# Patient Record
Sex: MALE | Race: WHITE | Employment: FULL TIME | URBAN - METROPOLITAN AREA
[De-identification: names, ages, dates, MRNs, and addresses within clinical notes are randomized per-mention and may not be internally consistent; named-entity substitution may affect disease eponyms.]

---

## 2020-09-08 DIAGNOSIS — G47.09 OTHER INSOMNIA: ICD-10-CM

## 2020-09-09 RX ORDER — ZOLPIDEM TARTRATE 5 MG/1
TABLET ORAL
Qty: 30 TAB | Refills: 1 | Status: SHIPPED | OUTPATIENT
Start: 2020-09-09 | End: 2020-11-04 | Stop reason: SDUPTHER

## 2020-09-18 DIAGNOSIS — E55.9 VITAMIN D DEFICIENCY, UNSPECIFIED: ICD-10-CM

## 2020-09-18 RX ORDER — ERGOCALCIFEROL 1.25 MG/1
CAPSULE ORAL
Qty: 12 CAP | Refills: 0 | Status: SHIPPED | OUTPATIENT
Start: 2020-09-18 | End: 2020-11-04 | Stop reason: ALTCHOICE

## 2020-09-24 DIAGNOSIS — J30.9 ALLERGIC RHINITIS, UNSPECIFIED: ICD-10-CM

## 2020-09-24 RX ORDER — FLUTICASONE PROPIONATE 50 MCG
SPRAY, SUSPENSION (ML) NASAL
Qty: 1 BOTTLE | Refills: 1 | Status: SHIPPED | OUTPATIENT
Start: 2020-09-24 | End: 2020-10-19

## 2020-09-29 DIAGNOSIS — G47.09 OTHER INSOMNIA: Primary | ICD-10-CM

## 2020-09-29 NOTE — TELEPHONE ENCOUNTER
Pt left a voicemail on 09/29/20 @ 4:25 pm, stating that he needs a refill on zolpidem (AMBIEN) 5 mg tablet sent to [facility]).

## 2020-09-30 RX ORDER — ZOLPIDEM TARTRATE 5 MG/1
5 TABLET ORAL
Qty: 30 TAB | Refills: 3 | Status: SHIPPED | OUTPATIENT
Start: 2020-09-30 | End: 2020-11-04 | Stop reason: ALTCHOICE

## 2020-10-17 DIAGNOSIS — J30.9 ALLERGIC RHINITIS, UNSPECIFIED: ICD-10-CM

## 2020-10-19 RX ORDER — HYDROCHLOROTHIAZIDE 25 MG/1
TABLET ORAL
Qty: 90 TAB | Refills: 3 | Status: SHIPPED | OUTPATIENT
Start: 2020-10-19 | End: 2021-04-15

## 2020-10-19 RX ORDER — FLUTICASONE PROPIONATE 50 MCG
SPRAY, SUSPENSION (ML) NASAL
Qty: 1 BOTTLE | Refills: 1 | Status: SHIPPED | OUTPATIENT
Start: 2020-10-19 | End: 2020-11-04 | Stop reason: ALTCHOICE

## 2020-11-04 ENCOUNTER — OFFICE VISIT (OUTPATIENT)
Dept: FAMILY MEDICINE CLINIC | Age: 55
End: 2020-11-04
Payer: COMMERCIAL

## 2020-11-04 VITALS
DIASTOLIC BLOOD PRESSURE: 99 MMHG | HEART RATE: 91 BPM | OXYGEN SATURATION: 9 % | SYSTOLIC BLOOD PRESSURE: 167 MMHG | BODY MASS INDEX: 30.51 KG/M2 | TEMPERATURE: 97 F | WEIGHT: 230.2 LBS | HEIGHT: 73 IN

## 2020-11-04 DIAGNOSIS — I10 ESSENTIAL HYPERTENSION: Primary | ICD-10-CM

## 2020-11-04 DIAGNOSIS — B35.1 ONYCHOMYCOSIS: ICD-10-CM

## 2020-11-04 DIAGNOSIS — G47.09 OTHER INSOMNIA: ICD-10-CM

## 2020-11-04 DIAGNOSIS — E11.9 TYPE 2 DIABETES MELLITUS WITHOUT COMPLICATION, WITHOUT LONG-TERM CURRENT USE OF INSULIN (HCC): ICD-10-CM

## 2020-11-04 DIAGNOSIS — R43.0 LOSS OF PERCEPTION FOR SMELL: ICD-10-CM

## 2020-11-04 DIAGNOSIS — E55.9 VITAMIN D DEFICIENCY: ICD-10-CM

## 2020-11-04 PROCEDURE — 99214 OFFICE O/P EST MOD 30 MIN: CPT | Performed by: FAMILY MEDICINE

## 2020-11-04 RX ORDER — BLOOD-GLUCOSE METER
KIT MISCELLANEOUS
Qty: 300 STRIP | Refills: 5 | Status: SHIPPED | OUTPATIENT
Start: 2020-11-04

## 2020-11-04 RX ORDER — INSULIN PUMP SYRINGE, 3 ML
EACH MISCELLANEOUS
Qty: 1 KIT | Refills: 0 | Status: SHIPPED | OUTPATIENT
Start: 2020-11-04 | End: 2020-11-13 | Stop reason: DRUGHIGH

## 2020-11-04 RX ORDER — LANCETS
EACH MISCELLANEOUS
Qty: 300 EACH | Refills: 5 | Status: SHIPPED | OUTPATIENT
Start: 2020-11-04 | End: 2021-12-22 | Stop reason: ALTCHOICE

## 2020-11-04 RX ORDER — ZOLPIDEM TARTRATE 5 MG/1
5 TABLET ORAL
Qty: 30 TAB | Refills: 3 | Status: SHIPPED | OUTPATIENT
Start: 2020-11-04 | End: 2022-03-17

## 2020-11-04 NOTE — PROGRESS NOTES
ID:  Willy Telles , 54 y.o., male      CHIEF COMPLAINT:   Chief Complaint   Patient presents with    Diabetes     c/o loss of appetite and loss of taste. C/o sore on left big toe. Patient Active Problem List   Diagnosis Code    ED (erectile dysfunction) N52.9    Insomnia G47.00    Gout M10.9    Diabetes mellitus ([facility] [address] E11.9    HTN (hypertension) I10    Hypertension I10    Diabetes ([facility] [address] E11.9       HISTORY OF PRESENT ILLNESS:    Deneen Amador is a 54 y.o. male with the above listed medical problems and comes in today for several complaints. First is he needs a diabetic meter. He has type 2 diabetes which is poorly controlled. His last A1c was 9.9%. This was in June of this year. He was put on Jardiance in addition to his Metformin. He denies any other urinary tract infections genital itching scratching or rashes. He denies any dysuria. He also complains of a loss of sense of smell and taste. He says this was going away before COVID-19 and is still persistent. He got some over-the-counter medication for heartburn which sounds like Pepcid. However, it is only been 2 weeks and he says he has not noted a difference. His heartburn however, is better. He has no hematochezia or melena. He also complains of an ulcer on his left great toe. Is been there for about 3 weeks. He has no pain. There is no discharge. He denies fever chills or sweats. He has not been putting anything on it. He thinks is from a pair shoes he had that rubbed that area. He no longer wears them. He also has insomnia. He says the zolpidem 5 mg daily does help. He has no morning time grogginess. He takes no opioids. He recently had a left ulnar nerve transposition and carpal tunnel release. He is on disability currently for that and is going back to work soon. He will have the other one done at the next available opportunity on the right side.   Just carpal tunnel although no ulnar issues. ALLERGIES:   Allergies   Allergen Reactions    Teicoplanin Rash         MEDICATIONS:     Current Outpatient Medications:     hydroCHLOROthiazide (HYDRODIURIL) 25 mg tablet, TAKE 1 TABLET BY MOUTH EVERY DAY FOR BLOOD PRESSURE, Disp: 90 Tab, Rfl: 3    zolpidem (AMBIEN) 5 mg tablet, TAKE 1 TABLET BY MOUTH EVERY DAY, Disp: 30 Tab, Rfl: 1    valsartan (DIOVAN) 160 mg tablet, Take  by mouth daily. , Disp: , Rfl:     allopurinol (ZYLOPRIM) 300 mg tablet, Take  by mouth daily. , Disp: , Rfl:     metFORMIN (GLUCOPHAGE) 1,000 mg tablet, Take 1,000 mg by mouth two (2) times daily (with meals). , Disp: , Rfl:     glucose blood VI test strips (FREESTYLE LITE STRIPS) strip, Bid monitoring dx: 250.00, Disp: 1 Package, Rfl: 11    Blood-Glucose Meter (FREESTYLE LITE METER) monitoring kit, Dx: 250.00, Disp: 1 Kit, Rfl: 0    Lancets misc, Bid monitoring dx: 250.00, Disp: 1 Package, Rfl: 11    SOCIAL:   Social History     Tobacco Use    Smoking status: Never Smoker    Smokeless tobacco: Never Used   Substance Use Topics    Alcohol use: No    Drug use: No       SURGERIES:   Past Surgical History:   Procedure Laterality Date    ABDOMEN SURGERY PROC UNLISTED      HX CARPAL TUNNEL RELEASE Left 09/21/2020    HX HEENT      HX ORTHOPAEDIC            FAMILY HX:  Family History   Problem Relation Age of Onset    Hypertension Mother     Diabetes Father     Stroke Father     Heart Disease Father     Heart Attack Father          Review of systems:   I personally collected this information from the patient , available records and [facility] members [facility]  Review of Systems   Constitutional: Positive for appetite change and unexpected weight change (Not unexpected necessarily but not trying however his appetite is diminished, 6 pounds since July 2020.). Negative for activity change, chills, diaphoresis and fever.    HENT: Negative for congestion, ear discharge, ear pain, hearing loss, rhinorrhea, sinus pressure, sneezing, sore throat, tinnitus, trouble swallowing and voice change. Eyes: Negative for photophobia, pain, discharge and visual disturbance. Respiratory: Negative for cough, chest tightness, shortness of breath and wheezing. Cardiovascular: Negative for chest pain, palpitations and leg swelling. Gastrointestinal: Negative for abdominal distention, abdominal pain, blood in stool, constipation, diarrhea, nausea and vomiting. Endocrine: Negative for cold intolerance, heat intolerance, polydipsia and polyphagia. Genitourinary: Negative for difficulty urinating, dysuria, frequency, hematuria and urgency. Musculoskeletal: Positive for arthralgias, joint swelling and myalgias. Negative for back pain, gait problem and neck pain. Skin: Positive for color change and wound. Negative for rash. Neurological: Negative for dizziness, tremors, syncope, speech difficulty, weakness, light-headedness, numbness and headaches. Hematological: Negative for adenopathy. Does not bruise/bleed easily. Psychiatric/Behavioral: Positive for sleep disturbance. Negative for agitation, behavioral problems, confusion, decreased concentration, dysphoric mood, hallucinations and suicidal ideas. The patient is not nervous/anxious. VITAL SIGNS:   Visit Vitals  BP (!) 167/99 (BP 1 Location: Right arm, BP Patient Position: Sitting)   Pulse 91   Temp 97 °F (36.1 °C) (Oral)   Ht 6' 1\" (1.854 m)   Wt 230 lb 3.2 oz (104.4 kg)   SpO2 (!) 9%   BMI 30.37 kg/m²     Blood pressure recheck: 160/90      PHYSICAL EXAMINATION:  Physical Exam  Nursing note reviewed. Constitutional:       General: He is not in acute distress. Appearance: Normal appearance. He is obese. He is not ill-appearing or toxic-appearing. HENT:      Right Ear: Tympanic membrane, ear canal and external ear normal.      Left Ear: Tympanic membrane, ear canal and external ear normal.      Nose: Congestion (Slight nasal bogginess with no exudate. Slight erythema.) and rhinorrhea present. Mouth/Throat:      Pharynx: Posterior oropharyngeal erythema present. No oropharyngeal exudate. Eyes:      General: No scleral icterus. Extraocular Movements: Extraocular movements intact. Conjunctiva/sclera: Conjunctivae normal.      Pupils: Pupils are equal, round, and reactive to light. Neck:      Musculoskeletal: No neck rigidity or muscular tenderness. Vascular: No carotid bruit. Cardiovascular:      Rate and Rhythm: Normal rate and regular rhythm. Pulses: Normal pulses. Heart sounds: No murmur. No friction rub. No gallop. Comments: Pedal pulses are normal bilaterally. Pulmonary:      Effort: Pulmonary effort is normal.      Breath sounds: Normal breath sounds. No wheezing, rhonchi or rales. Abdominal:      General: Bowel sounds are normal. There is no distension. Palpations: Abdomen is soft. There is no mass. Tenderness: There is no abdominal tenderness. Musculoskeletal:         General: No swelling, tenderness or deformity. Right lower leg: No edema. Left lower leg: No edema. Lymphadenopathy:      Cervical: No cervical adenopathy. Skin:     Capillary Refill: Capillary refill takes less than 2 seconds. Coloration: Skin is not jaundiced. Findings: Lesion (Dorsal left great toe has about a 4 mm central eschar that has about 8 mm of slightly raised erythema. There is no streaking. There is no exudate. There is no warmth. There is no tenderness. Capillary refill is normal.  It feels very superficial.) present. No erythema or rash. Neurological:      General: No focal deficit present. Mental Status: He is alert and oriented to person, place, and time. Mental status is at baseline. Cranial Nerves: No cranial nerve deficit. Sensory: No sensory deficit.       Coordination: Coordination normal.      Gait: Gait normal.   Psychiatric:         Mood and Affect: Mood normal. Behavior: Behavior normal.         Thought Content: Thought content normal.         Judgment: Judgment normal.         ASSESSMENT/PLAN:    The following diagnoses were identified today, reviewed and discussed. 1. Essential hypertension    Recheck: Is a little improved so continue to monitor this. - LIPID PANEL  - TSH 3RD GENERATION  - CBC WITH AUTOMATED DIFF  - METABOLIC PANEL, COMPREHENSIVE    2. Type 2 diabetes mellitus without complication, without long-term current use of insulin (HCC)  Tenuous medications. However, we will check labs and follow-up and treat as indicated. I did recommend fingerstick blood sugar 3 times daily.    - Blood-Glucose Meter monitoring kit; Use  To check sugars 3 times daily  DX CODE E11.65  Dispense: 1 Kit; Refill: 0  - glucose blood VI test strips (FreeStyle Lite Strips) strip; Use  To check sugars 3 times daily  DX CODE E11.65  Dispense: 300 Strip; Refill: 5  - lancets misc; Use  To check sugars 3 times daily  DX CODE E11.65  Dispense: 300 Each; Refill: 5  - HEMOGLOBIN A1C WITH EAG  - LIPID PANEL  - METABOLIC PANEL, COMPREHENSIVE    3. Other insomnia    Quality of life is affected positively by a good nights rest for this patient and there are no adverse side effects so he may continue the zolpidem. - zolpidem (AMBIEN) 5 mg tablet; Take 1 Tab by mouth nightly. Max Daily Amount: 5 mg. Dispense: 30 Tab; Refill: 3    4. Loss of perception for smell  Undetermined. Sounds like it could be possibly some silent reflux (LPR). I recommend continuing the antiacid (H2 blocker) for 2 months and then call me certainly if needed we can send to an ENT. 5. Onychomycosis    This is a bilateral feet. I recommended just monitoring because of the adverse effects of the pills and the topicals were not as effective. 6. Vitamin D deficiency    - VITAMIN D, 25 HYDROXY        Discussion:    The patient and I discussed the following items/issues;     Each diagnosis listed for today's visit including medications, treatment, testing such as labs, imagine, referrals and when to call regarding results and appointments. Reminded patient to keep any and all appointments with specialists, labs, imaging. Reminded patient to make sure we get copies of any specialists care, labs and imaging. Reminded patient to call of come by the office if there are any concerns, questions , comments or problems. The patient verbalized understanding of the care plan and all questions were answered to the patient's satisfaction prior to leaving the office. The patient was told that failure to comply with recommended testing could result in abnormal health consequences. The patient was instructed to have yearly routine health maintenance including but not limited to age appropriate vaccines, testing, screening exams. The patient actively participated in medical decision making. FOLLOW UP:   Patient knows to keep any and all future visits scheduled unless told otherwise. Patient knows to call, come back if any concerns, questions, comments or problems arise. Follow-up and Dispositions    · Return in about 3 months (around 2/4/2021) for Follow  up diabetes, bp, nerve issues, sleep. This visit may have been completed , in part, with voice recognition software as well as typing and may have syntax errors despite editing.       Roselyn Velez, DO

## 2020-11-04 NOTE — PATIENT INSTRUCTIONS
General Health and concerns:  HEART HEALTHY DIET:  A heart healthy diet is one that is low in cholesterol (less than 300 mg daily), fat (less than 80 g daily) . You should also minimize carbohydrates / sugars (less amounts of breads, pastas, potato and potato products and sugary foods/snacks, cookies, cakes, etc) . Try to eat whole wheat/multigrain breads and pastas and eat more vegetables. Cook with olive oil (or no oil) and grill, bake, broil or boil foods. Less red meat and more chicken , fish and lean cuts of beef (limited). 6649-3784 calories per day is sufficient 2503-4924 is acceptable for weight loss. EXERCISE:  You should do exercise 3-5 days per week (minimum) to include increasing your heart rate for 30 to 45 minutes. At least a pace of a brisk walk should do that. This build up your heart and lung endurance and muscles and helps many function of the body. OTHER:        Routine Health maintenance: You need to get a yearly follow up/physical exam to review, discuss age and gender appropriate exams, labs, vaccines and screening tests. This includes cardiovascular health risk, cancer screens and other nisha related topics. Medications-take all medications as directed. Please do not stop unless you talk to your doctor or health care provider first. Report any problems immediately. Referrals: if you have been given a referral, please call the office if you do not hear from provider in one week. You may make the appointment yourself. Please keep all appointments with specialists and ask them to send their notes, thoughts, recommendations to us , as your PCP. Imaging/Labs:  Be sure to get these images in a timely manner. IF your test must be scheduled, let us know if you need help getting this done and if you do not hear from that provider in a week , call us or them.   BE SURE to call the office if you do not hear regarding the results in one week after the test is performed Image or lab). It is our intention to inform you of the results ALWAYS, even if normal you should get a notification (Call, portal message). PLEASE flo if you do not get the results. PLEASE follow all recommendations and call/come in /ask questions if you do not understand of if problems develop after or in between visits. Failure to comply with recommended health care advise could result in serious health consequences. Thank you for choosing our practice and please let us know how we can help you feel better and stay well!

## 2020-11-13 ENCOUNTER — TELEPHONE (OUTPATIENT)
Dept: FAMILY MEDICINE CLINIC | Age: 55
End: 2020-11-13

## 2020-11-13 DIAGNOSIS — E11.9 TYPE 2 DIABETES MELLITUS WITHOUT COMPLICATION, WITHOUT LONG-TERM CURRENT USE OF INSULIN (HCC): Primary | ICD-10-CM

## 2020-11-13 RX ORDER — LANCETS
EACH MISCELLANEOUS
Qty: 300 EACH | Refills: 3 | Status: SHIPPED | OUTPATIENT
Start: 2020-11-13

## 2020-11-13 RX ORDER — BLOOD-GLUCOSE METER
EACH MISCELLANEOUS
Qty: 1 EACH | Refills: 0 | Status: SHIPPED | OUTPATIENT
Start: 2020-11-13

## 2020-11-13 RX ORDER — LANCING DEVICE/LANCETS
KIT MISCELLANEOUS
Qty: 1 KIT | Refills: 0 | Status: SHIPPED | OUTPATIENT
Start: 2020-11-13

## 2020-11-13 RX ORDER — BLOOD SUGAR DIAGNOSTIC
STRIP MISCELLANEOUS
Qty: 300 STRIP | Refills: 3 | Status: SHIPPED | OUTPATIENT
Start: 2020-11-13 | End: 2021-11-29

## 2020-11-13 NOTE — TELEPHONE ENCOUNTER
54 y.o. , Luigi Yung , male       Allergies   Allergen Reactions    Teicoplanin Rash     Current Outpatient Medications on File Prior to Visit   Medication Sig Dispense Refill    zolpidem (AMBIEN) 5 mg tablet Take 1 Tab by mouth nightly. Max Daily Amount: 5 mg. 30 Tab 3    glucose blood VI test strips (FreeStyle Lite Strips) strip Use  To check sugars 3 times daily  DX CODE E11.65 300 Strip 5    lancets misc Use  To check sugars 3 times daily  DX CODE E11.65 300 Each 5    [DISCONTINUED] Blood-Glucose Meter monitoring kit Use  To check sugars 3 times daily  DX CODE E11.65 1 Kit 0    hydroCHLOROthiazide (HYDRODIURIL) 25 mg tablet TAKE 1 TABLET BY MOUTH EVERY DAY FOR BLOOD PRESSURE 90 Tab 3    valsartan (DIOVAN) 160 mg tablet Take  by mouth daily.  allopurinol (ZYLOPRIM) 300 mg tablet Take  by mouth daily.  metFORMIN (GLUCOPHAGE) 1,000 mg tablet Take 1,000 mg by mouth two (2) times daily (with meals).  glucose blood VI test strips (FREESTYLE LITE STRIPS) strip Bid monitoring dx: 250.00 1 Package 11    Lancets misc Bid monitoring dx: 250.00 1 Package 11    [DISCONTINUED] Blood-Glucose Meter (FREESTYLE LITE METER) monitoring kit Dx: 250.00 1 Kit 0     No current facility-administered medications on file prior to visit. Patient Active Problem List   Diagnosis Code    ED (erectile dysfunction) N52.9    Insomnia G47.00    Gout M10.9    Diabetes mellitus ([address] E11.9    HTN (hypertension) I10    Hypertension I10    Diabetes ([facility] [address] E11.9    Loss of perception for smell R48.1    Onychomycosis B35.1       1.  Type 2 diabetes mellitus without complication, without long-term current use of insulin (HCC)    - Blood-Glucose Meter (Accu-Chek Guide Me Glucose Mtr) misc; Use to check finger stick blood glucose TID  DX CODE E11.9  Dispense: 1 Each; Refill: 0  - glucose blood VI test strips (Accu-Chek Guide test strips) strip; Use to check finger stick blood glucose TID  DX CODE E11.9  Dispense: 300 Strip; Refill: 3  - Lancing Device with Lancets (Accu-Chek Multiclix Lancet) kit; Use to check finger stick blood glucose TID  DX CODE E11.9  Dispense: 1 Kit;  Refill: 0  - lancets (Accu-Chek Multiclix Lancet) misc; Use to check finger stick blood glucose TID  DX CODE E11.9  Dispense: 300 Each; Refill: [facility]

## 2020-11-13 NOTE — TELEPHONE ENCOUNTER
Patient called stating that his insurance will not cover the type of Blood-Glucose Meter/test strips/lancets that was ordered on his last visit. His insurance will cover Accucheck Guide Me Meter/Strips/Lancets. Can a new order for these be sent to [facility]

## 2020-12-18 DIAGNOSIS — E11.9 TYPE 2 DIABETES MELLITUS WITHOUT COMPLICATIONS (HCC): ICD-10-CM

## 2020-12-21 RX ORDER — METFORMIN HYDROCHLORIDE 1000 MG/1
TABLET ORAL
Qty: 180 TAB | Refills: 1 | Status: SHIPPED | OUTPATIENT
Start: 2020-12-21 | End: 2021-05-24

## 2020-12-30 DIAGNOSIS — E55.9 VITAMIN D DEFICIENCY, UNSPECIFIED: ICD-10-CM

## 2020-12-30 RX ORDER — ERGOCALCIFEROL 1.25 MG/1
CAPSULE ORAL
Qty: 12 CAP | Refills: 0 | Status: SHIPPED | OUTPATIENT
Start: 2020-12-30 | End: 2021-04-15 | Stop reason: SDUPTHER

## 2021-01-11 RX ORDER — ALLOPURINOL 300 MG/1
TABLET ORAL
Qty: 90 TAB | Refills: 5 | Status: SHIPPED | OUTPATIENT
Start: 2021-01-11 | End: 2022-03-01 | Stop reason: SDUPTHER

## 2021-03-25 DIAGNOSIS — F41.1 GENERALIZED ANXIETY DISORDER: ICD-10-CM

## 2021-03-25 RX ORDER — BUSPIRONE HYDROCHLORIDE 5 MG/1
TABLET ORAL
Qty: 180 TAB | Refills: 2 | Status: SHIPPED | OUTPATIENT
Start: 2021-03-25 | End: 2022-04-21 | Stop reason: ALTCHOICE

## 2021-04-09 LAB
25(OH)D3+25(OH)D2 SERPL-MCNC: 17.6 NG/ML (ref 30–100)
ALBUMIN SERPL-MCNC: 4.4 G/DL (ref 3.8–4.9)
ALBUMIN/GLOB SERPL: 1.8 {RATIO} (ref 1.2–2.2)
ALP SERPL-CCNC: 115 IU/L (ref 39–117)
ALT SERPL-CCNC: 13 IU/L (ref 0–44)
AST SERPL-CCNC: 9 IU/L (ref 0–40)
BASOPHILS # BLD AUTO: 0.1 X10E3/UL (ref 0–0.2)
BASOPHILS NFR BLD AUTO: 1 %
BILIRUB SERPL-MCNC: 0.6 MG/DL (ref 0–1.2)
BUN SERPL-MCNC: 26 MG/DL (ref 6–24)
BUN/CREAT SERPL: 20 (ref 9–20)
CALCIUM SERPL-MCNC: 9.4 MG/DL (ref 8.7–10.2)
CHLORIDE SERPL-SCNC: 103 MMOL/L (ref 96–106)
CHOLEST SERPL-MCNC: 178 MG/DL (ref 100–199)
CO2 SERPL-SCNC: 25 MMOL/L (ref 20–29)
CREAT SERPL-MCNC: 1.32 MG/DL (ref 0.76–1.27)
EOSINOPHIL # BLD AUTO: 0.4 X10E3/UL (ref 0–0.4)
EOSINOPHIL NFR BLD AUTO: 7 %
ERYTHROCYTE [DISTWIDTH] IN BLOOD BY AUTOMATED COUNT: 13.5 % (ref 11.6–15.4)
EST. AVERAGE GLUCOSE BLD GHB EST-MCNC: 229 MG/DL
GLOBULIN SER CALC-MCNC: 2.5 G/DL (ref 1.5–4.5)
GLUCOSE SERPL-MCNC: 129 MG/DL (ref 65–99)
HBA1C MFR BLD: 9.6 % (ref 4.8–5.6)
HCT VFR BLD AUTO: 47 % (ref 37.5–51)
HDLC SERPL-MCNC: 42 MG/DL
HGB BLD-MCNC: 15.2 G/DL (ref 13–17.7)
IMM GRANULOCYTES # BLD AUTO: 0 X10E3/UL (ref 0–0.1)
IMM GRANULOCYTES NFR BLD AUTO: 1 %
LDLC SERPL CALC-MCNC: 106 MG/DL (ref 0–99)
LYMPHOCYTES # BLD AUTO: 2.1 X10E3/UL (ref 0.7–3.1)
LYMPHOCYTES NFR BLD AUTO: 33 %
MCH RBC QN AUTO: 30.8 PG (ref 26.6–33)
MCHC RBC AUTO-ENTMCNC: 32.3 G/DL (ref 31.5–35.7)
MCV RBC AUTO: 95 FL (ref 79–97)
MONOCYTES # BLD AUTO: 0.4 X10E3/UL (ref 0.1–0.9)
MONOCYTES NFR BLD AUTO: 6 %
NEUTROPHILS # BLD AUTO: 3.5 X10E3/UL (ref 1.4–7)
NEUTROPHILS NFR BLD AUTO: 52 %
PLATELET # BLD AUTO: 197 X10E3/UL (ref 150–450)
POTASSIUM SERPL-SCNC: 4.9 MMOL/L (ref 3.5–5.2)
PROT SERPL-MCNC: 6.9 G/DL (ref 6–8.5)
RBC # BLD AUTO: 4.93 X10E6/UL (ref 4.14–5.8)
SODIUM SERPL-SCNC: 142 MMOL/L (ref 134–144)
TRIGL SERPL-MCNC: 173 MG/DL (ref 0–149)
TSH SERPL DL<=0.005 MIU/L-ACNC: 2.53 UIU/ML (ref 0.45–4.5)
VLDLC SERPL CALC-MCNC: 30 MG/DL (ref 5–40)
WBC # BLD AUTO: 6.5 X10E3/UL (ref 3.4–10.8)

## 2021-04-15 ENCOUNTER — OFFICE VISIT (OUTPATIENT)
Dept: FAMILY MEDICINE CLINIC | Age: 56
End: 2021-04-15
Payer: COMMERCIAL

## 2021-04-15 VITALS
TEMPERATURE: 97.5 F | HEART RATE: 82 BPM | WEIGHT: 232 LBS | DIASTOLIC BLOOD PRESSURE: 84 MMHG | OXYGEN SATURATION: 99 % | HEIGHT: 73 IN | SYSTOLIC BLOOD PRESSURE: 152 MMHG | BODY MASS INDEX: 30.75 KG/M2

## 2021-04-15 DIAGNOSIS — M19.90 ARTHRITIS: ICD-10-CM

## 2021-04-15 DIAGNOSIS — F41.1 GENERALIZED ANXIETY DISORDER: ICD-10-CM

## 2021-04-15 DIAGNOSIS — I10 ESSENTIAL HYPERTENSION: ICD-10-CM

## 2021-04-15 DIAGNOSIS — E11.9 TYPE 2 DIABETES MELLITUS WITHOUT COMPLICATION, WITHOUT LONG-TERM CURRENT USE OF INSULIN (HCC): Primary | ICD-10-CM

## 2021-04-15 DIAGNOSIS — R43.9 TASTE DISORDER: ICD-10-CM

## 2021-04-15 DIAGNOSIS — E55.9 VITAMIN D DEFICIENCY, UNSPECIFIED: ICD-10-CM

## 2021-04-15 PROCEDURE — 99214 OFFICE O/P EST MOD 30 MIN: CPT | Performed by: FAMILY MEDICINE

## 2021-04-15 RX ORDER — HYDROCHLOROTHIAZIDE 25 MG/1
1 TABLET ORAL DAILY
COMMUNITY
Start: 2020-07-29 | End: 2021-04-15

## 2021-04-15 RX ORDER — ERGOCALCIFEROL 1.25 MG/1
CAPSULE ORAL
Qty: 12 CAP | Refills: 3 | Status: SHIPPED | OUTPATIENT
Start: 2021-04-15 | End: 2022-04-16 | Stop reason: SDUPTHER

## 2021-04-15 RX ORDER — GLIMEPIRIDE 2 MG/1
2 TABLET ORAL
Qty: 30 TAB | Refills: 3 | Status: SHIPPED | OUTPATIENT
Start: 2021-04-15 | End: 2021-07-07

## 2021-04-15 NOTE — PATIENT INSTRUCTIONS
General Health and concerns:  HEART HEALTHY DIET:  A heart healthy diet is one that is low in cholesterol (less than 300 mg daily), fat (less than 80 g daily) . You should also minimize carbohydrates / sugars (less amounts of breads, pastas, potato and potato products and sugary foods/snacks, cookies, cakes, etc) . Try to eat whole wheat/multigrain breads and pastas and eat more vegetables. Cook with olive oil (or no oil) and grill, bake, broil or boil foods. Less red meat and more chicken , fish and lean cuts of beef (limited). 9794-2905 calories per day is sufficient 5276-8877 is acceptable for weight loss. EXERCISE:  You should do exercise 3-5 days per week (minimum) to include increasing your heart rate for 30 to 45 minutes. At least a pace of a brisk walk should do that. This build up your heart and lung endurance and muscles and helps many function of the body. OTHER:        Routine Health maintenance: You need to get a yearly follow up/physical exam to review, discuss age and gender appropriate exams, labs, vaccines and screening tests. This includes cardiovascular health risk, cancer screens and other nisha related topics. Medications-Take all medications as directed. Please do not stop unless you talk to your doctor or health care provider first. Report any problems immediately. Referrals: if you have been given a referral, please call the office if you do not hear from provider in one week. You may make the appointment yourself. Please keep all appointments with specialists and ask them to send their notes, thoughts, recommendations to us , as your PCP. Imaging/Labs:  Be sure to get these images in a timely manner. IF your test must be scheduled, let us know if you need help getting this done and if you do not hear from that provider in a week , call us or them.   BE SURE to call the office if you do not hear regarding the results in one week after the test is performed Image or lab). It is our intention to inform you of the results ALWAYS, even if normal you should get a notification (Call, portal message). PLEASE flo if you do not get the results. PLEASE follow all recommendations and call/come in /ask questions if you do not understand of if problems develop after or in between visits. Failure to comply with recommended health care advise could result in serious health consequences. Thank you for choosing our practice and please let us know how we can help you feel better and stay well!

## 2021-04-15 NOTE — PROGRESS NOTES
IDENTIFYING INFORMATION:  Miah Mcgee. Rafa , 54 y.o., male  [address] [place] [address],  [address]     CHIEF COMPLAINT:     Chief Complaint   Patient presents with    Hypertension    Diabetes     HISTORY OF PRESENT ILLNESS:    Rosa Roman is a 54 y.o. male  has a past medical history of Diabetes ([facility] [address] Gout, and Hypertension. .  he comes in for 6 month follow up. Has had a surgery on his left elbow since last visit. It was an ulnar nerve entrapment. Unfortunately he fell on it last week or so, at work, the film they were cutting up -was standing on and it pulled out from under him. Bruised badly but not painful and ROM is ok. Seeeing ortho next week for routine follow up. Sleep is poor and amitriptyline is helpful but feels groggy. Biggest shift is trying to sleep at night, the zolpidem helps during day.       PAST MEDICAL HISTORY:     Past Medical History:   Diagnosis Date    Diabetes ([facility] [address]     Gout     Hypertension        MEDICATIONS:     Current Outpatient Medications on File Prior to Visit   Medication Sig Dispense Refill    busPIRone (BUSPAR) 5 mg tablet TAKE 1 TABLET BY MOUTH TWICE A  Tab 2    allopurinoL (ZYLOPRIM) 300 mg tablet TAKE 1 TABLET BY MOUTH EVERY DAY 90 Tab 5    ergocalciferol (ERGOCALCIFEROL) 1,250 mcg (50,000 unit) capsule TAKE 1 CAPSULE BY MOUTH ONE TIME PER WEEK 12 Cap 0    metFORMIN (GLUCOPHAGE) 1,000 mg tablet TAKE 1 TABLET BY MOUTH TWICE A  Tab 1    Blood-Glucose Meter (Accu-Chek Guide Me Glucose Mtr) misc Use to check finger stick blood glucose TID  DX CODE E11.9 1 Each 0    glucose blood VI test strips (Accu-Chek Guide test strips) strip Use to check finger stick blood glucose TID  DX CODE E11.9 300 Strip 3    Lancing Device with Lancets (Accu-Chek Multiclix Lancet) kit Use to check finger stick blood glucose TID  DX CODE E11.9 1 Kit 0    lancets (Accu-Chek Multiclix Lancet) misc Use to check finger stick blood glucose TID  DX CODE E11.9 300 Each 3    zolpidem (AMBIEN) 5 mg tablet Take 1 Tab by mouth nightly. Max Daily Amount: 5 mg. 30 Tab 3    glucose blood VI test strips (FreeStyle Lite Strips) strip Use  To check sugars 3 times daily  DX CODE E11.65 300 Strip 5    lancets misc Use  To check sugars 3 times daily  DX CODE E11.65 300 Each 5    hydroCHLOROthiazide (HYDRODIURIL) 25 mg tablet TAKE 1 TABLET BY MOUTH EVERY DAY FOR BLOOD PRESSURE 90 Tab 3    valsartan (DIOVAN) 160 mg tablet Take  by mouth daily.  glucose blood VI test strips (FREESTYLE LITE STRIPS) strip Bid monitoring dx: 250.00 1 Package 11    Lancets misc Bid monitoring dx: 250.00 1 Package 11     No current facility-administered medications on file prior to visit. ALLERGIES:    Allergies   Allergen Reactions    Teicoplanin Rash         SOCIAL HISTORY:     Social History     Tobacco Use    Smoking status: Never Smoker    Smokeless tobacco: Never Used   Substance Use Topics    Alcohol use: No    Drug use: No       SURGICAL HISTORY:    Past Surgical History:   Procedure Laterality Date    HX CARPAL TUNNEL RELEASE Left 09/21/2020    HX HEENT      HX ORTHOPAEDIC      HX ORTHOPAEDIC Left 09/2020    Carpal tunnel, elbow and nerve transplant    CT ABDOMEN SURGERY PROC UNLISTED          FAMILY HISTORY:    Family History   Problem Relation Age of Onset    Hypertension Mother     Diabetes Father     Stroke Father     Heart Disease Father     Heart Attack Father          REVIEW OF SYSTEMS:    I personally collected this information from all available source present (patient/others in room and records available) -[email or web address]    Review of Systems   Constitutional: Positive for malaise/fatigue. Negative for chills, diaphoresis, fever and weight loss. HENT: Negative for congestion, ear pain, hearing loss, nosebleeds, sinus pain, sore throat and tinnitus. Taste is abnormal. Been covid tested twice and has been negative twice.     Eyes: Negative for blurred vision, double vision, photophobia and pain. Respiratory: Negative for cough, sputum production and shortness of breath. Cardiovascular: Negative for chest pain, palpitations, orthopnea, claudication, leg swelling and PND. Gastrointestinal: Negative for abdominal pain, blood in stool, constipation, diarrhea, heartburn, melena, nausea and vomiting. Genitourinary: Negative for dysuria, frequency and urgency. Musculoskeletal: Positive for joint pain and myalgias. Negative for back pain, falls and neck pain. Skin: Negative for itching and rash. Neurological: Negative for dizziness, tingling, tremors, sensory change, focal weakness and headaches. Psychiatric/Behavioral: Positive for depression. Negative for suicidal ideas. The patient is nervous/anxious and has insomnia. PHYSICAL EXAMINATION:    Vital Signs:    Visit Vitals  BP (!) 152/84 (BP 1 Location: Left upper arm, BP Patient Position: Sitting)   Pulse 82   Temp 97.5 °F (36.4 °C) (Temporal)   Ht 6' 1\" (1.854 m)   Wt 232 lb (105.2 kg)   SpO2 99%   BMI 30.61 kg/m²         Wt Readings from Last 3 Encounters:   04/15/21 232 lb (105.2 kg)   11/04/20 230 lb 3.2 oz (104.4 kg)   03/11/16 243 lb (110.2 kg)     BP Readings from Last 3 Encounters:   04/15/21 (!) 152/84   11/04/20 (!) 167/99   03/11/16 (!) 154/94         Physical Exam  Nursing note reviewed. Constitutional:       General: He is not in acute distress. Appearance: Normal appearance. He is not ill-appearing or toxic-appearing. HENT:      Right Ear: Tympanic membrane, ear canal and external ear normal.      Left Ear: Tympanic membrane, ear canal and external ear normal.      Nose: No congestion or rhinorrhea. Mouth/Throat:      Pharynx: No oropharyngeal exudate or posterior oropharyngeal erythema. Eyes:      General: No scleral icterus. Extraocular Movements: Extraocular movements intact.       Conjunctiva/sclera: Conjunctivae normal.      Pupils: Pupils are equal, round, and reactive to light. Neck:      Musculoskeletal: No neck rigidity or muscular tenderness. Vascular: No carotid bruit. Cardiovascular:      Rate and Rhythm: Normal rate and regular rhythm. Heart sounds: No murmur. No friction rub. No gallop. Pulmonary:      Effort: Pulmonary effort is normal.      Breath sounds: Normal breath sounds. No wheezing, rhonchi or rales. Abdominal:      General: Bowel sounds are normal. There is no distension. Palpations: Abdomen is soft. There is no mass. Tenderness: There is no abdominal tenderness. Musculoskeletal:         General: Swelling, tenderness and deformity present. Right lower leg: No edema. Left lower leg: No edema. Comments: Hands are swollen in the mcps and MIPS bilaterally. Knees are normal but stiffness to ROM, tender medially on the right. NO valgus or varus stress. Left elbow is swllign, tender and decreased ROM, status post surgery but it is actually normal Passive ROM. Ankles are normal.    Lymphadenopathy:      Cervical: No cervical adenopathy. Skin:     Capillary Refill: Capillary refill takes less than 2 seconds. Coloration: Skin is not jaundiced. Findings: No erythema or rash. Neurological:      General: No focal deficit present. Mental Status: He is alert and oriented to person, place, and time. Mental status is at baseline. Cranial Nerves: No cranial nerve deficit. Sensory: No sensory deficit. Coordination: Coordination normal.      Gait: Gait normal.   Psychiatric:         Mood and Affect: Mood normal.         Behavior: Behavior normal.         Thought Content: Thought content normal.         Judgment: Judgment normal.         ASSESSMENT/PLAN:    Discussion (regarding today's visit with Colt Riley); Add some glimepiride to his regimen.   I do realize this is a sulfonylurea and those are not recommended lately but this is a third line situation and his sugars are still very high so I recommend this to bring them down and I did discuss in detail the symptoms of hypoglycemia. Also, I refilled his vitamin D. With his placed this order I think that certainly would improve some his anxiety and depression if we can only figure out why so we will send him to ENT. He has been Covid tested twice and has been negative twice. He is having some arthritis in his hands and feet so we will rule out any autoimmune/rheumatological issues. It also sounds like he may need to find a new psychiatrist.  If he needs help with that he will let me know. ICD-10-CM ICD-9-CM    1. Type 2 diabetes mellitus without complication, without long-term current use of insulin (HCC)  E11.9 250.00 glimepiride (AMARYL) 2 mg tablet   2. Essential hypertension  I10 401.9    3. Vitamin D deficiency, unspecified  E55.9 268.9 ergocalciferol (ERGOCALCIFEROL) 1,250 mcg (50,000 unit) capsule   4. Generalized anxiety disorder  F41.1 300.02    5. Taste disorder  R43.9 781.1 REFERRAL TO ENT-OTOLARYNGOLOGY   6. Arthritis  M19.90 716.90 TERESA BY MULTIPLEX FLOW IA, QL      SED RATE (ESR)      RHEUMATOID FACTOR, QL     WE reviewed each diagnosis listed for today's visit including medications, treatment, testing such as labs, imagine, referrals and when to call regarding results and appointments. Reminded patient to keep any and all appointments with specialists, labs, imaging. Reminded patient to make sure we get copies of any specialists care, labs and imaging. Reminded patient to call of come by the office if there are any concerns, questions , comments or problems. The patient verbalized understanding of the care plan and all questions were answered to the patient's satisfaction prior to leaving the office. The patient was told that failure to comply with recommended testing could result in abnormal health consequences.     The patient was instructed to have yearly routine health maintenance including but not limited to age appropriate vaccines, testing, screening exams. ALL questions were answered to his satisfaction before leaving the office. The patient actively participated in medical decision making. FOLLOW UP:     Patient knows to keep any and all future visits scheduled unless told otherwise. Patient knows to call, come back if any concerns, questions, comments or problems arise. This visit may have been completed , in part, with voice recognition software as well as typing and may have syntax errors despite editing.       Musa Chamorro, DO

## 2021-04-15 NOTE — PROGRESS NOTES
1. Have you been to the ER, urgent care clinic since your last visit? Hospitalized since your last visit? No    2. Have you seen or consulted any other health care providers outside of the [address] [place] [address] since your last visit? Include any pap smears or colon screening.  No    Chief Complaint   Patient presents with    Hypertension    Diabetes     Visit Vitals  BP (!) 163/89 (BP 1 Location: Left upper arm, BP Patient Position: Sitting)   Pulse 89   Temp 97.5 °F (36.4 °C) (Temporal)   Ht 6' 1\" (1.854 m)   Wt 232 lb (105.2 kg)   SpO2 99%   BMI 30.61 kg/m²

## 2021-04-16 DIAGNOSIS — G47.09 OTHER INSOMNIA: ICD-10-CM

## 2021-04-16 RX ORDER — AMITRIPTYLINE HYDROCHLORIDE 25 MG/1
TABLET, FILM COATED ORAL
Qty: 90 TAB | Refills: 2 | Status: SHIPPED | OUTPATIENT
Start: 2021-04-16 | End: 2021-11-29

## 2021-05-10 ENCOUNTER — OFFICE VISIT (OUTPATIENT)
Dept: ENT CLINIC | Age: 56
End: 2021-05-10
Payer: COMMERCIAL

## 2021-05-10 VITALS
RESPIRATION RATE: 16 BRPM | HEART RATE: 83 BPM | WEIGHT: 231.6 LBS | BODY MASS INDEX: 31.37 KG/M2 | HEIGHT: 72 IN | OXYGEN SATURATION: 96 % | TEMPERATURE: 97.8 F | DIASTOLIC BLOOD PRESSURE: 82 MMHG | SYSTOLIC BLOOD PRESSURE: 150 MMHG

## 2021-05-10 DIAGNOSIS — R09.81 NASAL CONGESTION: ICD-10-CM

## 2021-05-10 DIAGNOSIS — R43.8 HYPOGEUSIA: Primary | ICD-10-CM

## 2021-05-10 PROCEDURE — 99243 OFF/OP CNSLTJ NEW/EST LOW 30: CPT | Performed by: OTOLARYNGOLOGY

## 2021-05-10 PROCEDURE — 31575 DIAGNOSTIC LARYNGOSCOPY: CPT | Performed by: OTOLARYNGOLOGY

## 2021-05-10 RX ORDER — DEXAMETHASONE 0.5 MG/5ML
ELIXIR ORAL
Qty: 1 BOTTLE | Refills: 0 | Status: SHIPPED | OUTPATIENT
Start: 2021-05-10 | End: 2021-08-23 | Stop reason: ALTCHOICE

## 2021-05-10 NOTE — PROGRESS NOTES
Chief Complaint   Patient presents with    New Patient     loss of taste x 1 yr     Visit Vitals  BP (!) 150/82 (BP 1 Location: Left upper arm, BP Patient Position: Sitting, BP Cuff Size: Large adult)   Pulse 83   Temp 97.8 °F (36.6 °C)   Resp 16   Ht 6' (1.829 m)   Wt 231 lb 9.6 oz (105.1 kg)   SpO2 96%   BMI 31.41 kg/m²

## 2021-05-10 NOTE — PROGRESS NOTES
Otolaryngology-Head and Neck Surgery  New Patient Visit     Patient: Jesus Wu  YOB: 1965  MRN: [id number]  Date of Service: 5/10/2021    Chief Complaint:  Decreased taste     History of Present Illness: Jesus Wu is a 54y.o. year old male who presents today for discussion of hypogeusia, ongoing for the last year or more. Has had 3 prior COVID tests, all have been negative. Decreased taste is constant, and unchanged over the last year    Smell is okay, though his wife has noticed that he is not able to smell things that she is able to.     Denies bad taste, bad smell    He is able to differentiate from salty versus sweet etc.    Denies any medication changes  Denies any new oral products  Denies any oral burning or sensitivity to foods    Past Medical History:  Past Medical History:   Diagnosis Date    Diabetes ([facility] [address]     Gout     Hypertension        Past Surgical History:   Past Surgical History:   Procedure Laterality Date    HX CARPAL TUNNEL RELEASE Left 09/21/2020    HX HEENT      HX ORTHOPAEDIC      HX ORTHOPAEDIC Left 09/2020    Carpal tunnel, elbow and nerve transplant    MI ABDOMEN SURGERY PROC UNLISTED         Medications:   Current Outpatient Medications   Medication Instructions    allopurinoL (ZYLOPRIM) 300 mg tablet TAKE 1 TABLET BY MOUTH EVERY DAY    amitriptyline (ELAVIL) 25 mg tablet TAKE 1 TABLET BY MOUTH EVERY DAY FOR PERSISTENT INSOMNIA    Blood-Glucose Meter (Accu-Chek Guide Me Glucose Mtr) misc Use to check finger stick blood glucose TID  DX CODE E11.9    busPIRone (BUSPAR) 5 mg tablet TAKE 1 TABLET BY MOUTH TWICE A DAY    dexAMETHasone (Decadron) 0.5 mg/5 mL elixir Swish and spit (do not swallow) 5-10 ml twice daily x 10 days    empagliflozin (JARDIANCE) 10 mg tablet 1 Tab, Oral, DAILY    ergocalciferol (ERGOCALCIFEROL) 1,250 mcg (50,000 unit) capsule TAKE 1 CAPSULE BY MOUTH ONE TIME PER WEEK    glimepiride (AMARYL) 2 mg, Oral, 7AM    glucose blood VI test strips (Accu-Chek Guide test strips) strip Use to check finger stick blood glucose TID  DX CODE E11.9    glucose blood VI test strips (FREESTYLE LITE STRIPS) strip Bid monitoring dx: 250.00    glucose blood VI test strips (FreeStyle Lite Strips) strip Use  To check sugars 3 times daily  DX CODE E11.65    lancets (Accu-Chek Multiclix Lancet) misc Use to check finger stick blood glucose TID  DX CODE E11.9    Lancets misc Bid monitoring dx: 250.00    lancets misc Use  To check sugars 3 times daily  DX CODE E11.65    Lancing Device with Lancets (Accu-Chek Multiclix Lancet) kit Use to check finger stick blood glucose TID  DX CODE E11.9    metFORMIN (GLUCOPHAGE) 1,000 mg tablet TAKE 1 TABLET BY MOUTH TWICE A DAY    valsartan (DIOVAN) 160 mg tablet DAILY    zolpidem (AMBIEN) 5 mg, Oral, EVERY BEDTIME       Allergies: Allergies   Allergen Reactions    Teicoplanin Rash       Social History:   Social History     Socioeconomic History    Marital status:    Tobacco Use    Smoking status: Never Smoker    Smokeless tobacco: Never Used   Substance and Sexual Activity    Alcohol use: No    Drug use: No    Sexual activity: Yes     Partners: Female       Family History:  Family History   Problem Relation Age of Onset    Hypertension Mother     Diabetes Father     Stroke Father     Heart Disease Father     Heart Attack Father        Review of Systems:    Consitutional: denies fever, excessive weight gain or loss. Eyes: denies diplopia, eye pain. Integumentary: denies new concerning skin lesions. Ears, Nose, Mouth, Throat: denies except as per HPI.   Endocrine: denies hot or cold intolerance, increased thirst.  Respiratory: denies cough, hemoptysis, wheezing  Gastrointestinal: denies trouble swallowing, nausea, emesis, regurgitation  Musculoskeletal: denies muscle weakness or wasting  Cardiovascular: denies chest pain, shortness of breath  Neurologic: denies seizures, numbness or tingling, syncope  Hematologic: denies easy bleeding or bruising    Physical Examination:   Vitals:    05/10/21 0935   BP: (!) 150/82   BP 1 Location: Left upper arm   BP Patient Position: Sitting   BP Cuff Size: Large adult   Pulse: 83   Resp: 16   Temp: 97.8 °F (36.6 °C)   SpO2: 96%   Weight: 231 lb 9.6 oz (105.1 kg)   Height: 6' (1.829 m)        General: Comfortable, pleasant, appears stated age  Voice: Strong, speaking in full sentences, no stridor    Face: No masses or lesions, facial strength symmetric   Ears: External ears unremarkable. Bilateral ear canal clear. Tympanic membrane clear and intact, with visible landmarks. Clear middle ear space  Nose: External nose unremarkable. Dorsum midline. Anterior rhinoscopy demonstrates no lesions. Septum midline. Turbinates without hypertrophy. Oral Cavity / Oropharynx: No trismus. Mucosa pink and moist. No lesions. Tongue is midline and mobile. Palate elevates symmetrically. Uvula midline. Tonsils unremarkable. Base of tongue soft. Floor of mouth soft. Neck: Supple. No adenopathy. Thyroid unremarkable. Palpable laryngeal landmarks. Full neck range of motion   Neurologic: CN II - XI intact. Normal gait    PROCEDURE: FLEXIBLE FIBEROPTIC LARYNGOSCOPY    Preoperative Diagnosis:  Hypogeusia     Postoperative Diagnosis: Same as above    Procedure: Flexible Fiberoptic Laryngoscopy    Anesthesia: Topical 4% Lidocaine, Oxymetazoline    Description of Procedure: Verbal informed consent was obtained. After application of topical anesthetic and decongestant, the flexible fiberoptic endoscope was introduced to the patient's nare. It was passed through the nose and into the pharynx. The scope was then withdrawn and repeated on the opposing nare. The patient tolerated the procedure well. Findings: Normal nasal cavity, no polyposis or purulence. Small amount of post nasal drip, clear. Middle meatus clear bilaterally. Nasopharynx without lesions.  Base of tongue, vallecula & epiglottis unremarkable. Clear pyriform sinus. Vocal folds without lesions. Normal mobility. Visualized subglottis appears patent. Assessment and Plan:   1. Hypogeusia   - Normal nasal and oral exam  - We discussed hypogeusia and hyposmia often are related - though he feels his sense of smell is ok  - He has upcoming bloodwork including autoimmune evaluation, will see results of that  - Otherwise add decadron elixir topically x 10 days  - Use saline irrigations for nose - pt has just purchased navage  - Follow up in about 1 month for recheck         The patient was instructed to return to clinic if no improvement or progression of symptoms. Signs to watch out for reviewed.       MD Enzo Peña[facility]  [address] [place] [address] [address]  [facility]  Office Phone: [phone]

## 2021-05-10 NOTE — LETTER
5/10/2021 Patient: Candido Swiftirmer YOB: 1965 Date of Visit: 5/10/2021 Ananth Garcia DO 
[address] [address] [address] [employer] Dear Ananth Garcia DO, Thank you for referring Mr. Hung Johnson to [address] [place] [address], NOSE, THROAT AND ALLERGY [facility] for evaluation. My notes for this consultation are attached. If you have questions, please do not hesitate to call me. I look forward to following your patient along with you. Sincerely, Leticia Alba MD

## 2021-05-23 DIAGNOSIS — E11.9 TYPE 2 DIABETES MELLITUS WITHOUT COMPLICATIONS (HCC): ICD-10-CM

## 2021-05-24 RX ORDER — METFORMIN HYDROCHLORIDE 1000 MG/1
TABLET ORAL
Qty: 180 TABLET | Refills: 1 | Status: SHIPPED | OUTPATIENT
Start: 2021-05-24 | End: 2021-11-19

## 2021-07-07 DIAGNOSIS — E11.9 TYPE 2 DIABETES MELLITUS WITHOUT COMPLICATION, WITHOUT LONG-TERM CURRENT USE OF INSULIN (HCC): ICD-10-CM

## 2021-07-07 RX ORDER — GLIMEPIRIDE 2 MG/1
TABLET ORAL
Qty: 90 TABLET | Refills: 1 | Status: SHIPPED | OUTPATIENT
Start: 2021-07-07 | End: 2021-10-18

## 2021-07-17 LAB
ANA SER QL: POSITIVE
ERYTHROCYTE [SEDIMENTATION RATE] IN BLOOD BY WESTERGREN METHOD: 2 MM/HR (ref 0–30)
RHEUMATOID FACT SERPL-ACNC: <10 IU/ML (ref 0–13.9)

## 2021-07-19 NOTE — PROGRESS NOTES
The giovanny is positive, perhaps false positive because the sed rate is normal  Cna recheck and if still + send to rheumatology

## 2021-07-21 ENCOUNTER — OFFICE VISIT (OUTPATIENT)
Dept: FAMILY MEDICINE CLINIC | Age: 56
End: 2021-07-21
Payer: COMMERCIAL

## 2021-07-21 VITALS
HEIGHT: 72 IN | TEMPERATURE: 97.3 F | WEIGHT: 230 LBS | HEART RATE: 70 BPM | BODY MASS INDEX: 31.15 KG/M2 | DIASTOLIC BLOOD PRESSURE: 71 MMHG | OXYGEN SATURATION: 99 % | SYSTOLIC BLOOD PRESSURE: 127 MMHG

## 2021-07-21 DIAGNOSIS — F41.1 GENERALIZED ANXIETY DISORDER: ICD-10-CM

## 2021-07-21 DIAGNOSIS — I10 ESSENTIAL HYPERTENSION: ICD-10-CM

## 2021-07-21 DIAGNOSIS — G62.9 PERIPHERAL POLYNEUROPATHY: ICD-10-CM

## 2021-07-21 DIAGNOSIS — G47.09 OTHER INSOMNIA: ICD-10-CM

## 2021-07-21 DIAGNOSIS — E11.9 TYPE 2 DIABETES MELLITUS WITHOUT COMPLICATION, WITHOUT LONG-TERM CURRENT USE OF INSULIN (HCC): Primary | ICD-10-CM

## 2021-07-21 DIAGNOSIS — E55.9 VITAMIN D DEFICIENCY, UNSPECIFIED: ICD-10-CM

## 2021-07-21 DIAGNOSIS — Z12.11 SCREENING FOR MALIGNANT NEOPLASM OF COLON: ICD-10-CM

## 2021-07-21 DIAGNOSIS — R53.83 FATIGUE, UNSPECIFIED TYPE: ICD-10-CM

## 2021-07-21 DIAGNOSIS — F32.A DEPRESSION, UNSPECIFIED DEPRESSION TYPE: ICD-10-CM

## 2021-07-21 DIAGNOSIS — M25.50 ARTHRALGIA, UNSPECIFIED JOINT: ICD-10-CM

## 2021-07-21 PROCEDURE — 99214 OFFICE O/P EST MOD 30 MIN: CPT | Performed by: FAMILY MEDICINE

## 2021-07-21 RX ORDER — MELOXICAM 15 MG/1
TABLET ORAL
COMMUNITY
Start: 2021-07-12 | End: 2021-12-22 | Stop reason: ALTCHOICE

## 2021-07-21 RX ORDER — CYCLOBENZAPRINE HCL 10 MG
TABLET ORAL
COMMUNITY
Start: 2021-07-12 | End: 2022-04-21 | Stop reason: SDUPTHER

## 2021-07-21 RX ORDER — DULOXETIN HYDROCHLORIDE 30 MG/1
30 CAPSULE, DELAYED RELEASE ORAL DAILY
Qty: 30 CAPSULE | Refills: 1 | Status: SHIPPED | OUTPATIENT
Start: 2021-07-21 | End: 2021-08-23 | Stop reason: SDUPTHER

## 2021-07-21 NOTE — PATIENT INSTRUCTIONS
General Health and concerns:  HEART HEALTHY DIET:  A heart healthy diet is one that is low in cholesterol (less than 300 mg daily), fat (less than 80 g daily) . You should also minimize carbohydrates / sugars (less amounts of breads, pastas, potato and potato products and sugary foods/snacks, cookies, cakes, etc) . Try to eat whole wheat/multigrain breads and pastas and eat more vegetables. Cook with olive oil (or no oil) and grill, bake, broil or boil foods. Less red meat and more chicken , fish and lean cuts of beef (limited). 3807-4088 calories per day is sufficient 3490-9659 is acceptable for weight loss. EXERCISE:  You should do exercise 3-5 days per week (minimum) to include increasing your heart rate for 30 to 45 minutes. At least a pace of a brisk walk should do that. This build up your heart and lung endurance and muscles and helps many function of the body. OTHER:        Routine Health maintenance: You need to get a yearly follow up/physical exam to review, discuss age and gender appropriate exams, labs, vaccines and screening tests. This includes cardiovascular health risk, cancer screens and other nisha related topics. Medications-Take all medications as directed. Please do not stop unless you talk to your doctor or health care provider first. Report any problems immediately. Referrals: if you have been given a referral, please call the office if you do not hear from provider in one week. You may make the appointment yourself. Please keep all appointments with specialists and ask them to send their notes, thoughts, recommendations to us , as your PCP. Imaging/Labs:  Be sure to get these images in a timely manner. IF your test must be scheduled, let us know if you need help getting this done and if you do not hear from that provider in a week , call us or them.   BE SURE to call the office if you do not hear regarding the results in one week after the test is performed Image or lab). It is our intention to inform you of the results ALWAYS, even if normal you should get a notification (Call, portal message). PLEASE flo if you do not get the results. PLEASE follow all recommendations and call/come in /ask questions if you do not understand of if problems develop after or in between visits. Failure to comply with recommended health care advise could result in serious health consequences. Thank you for choosing our practice and please let us know how we can help you feel better and stay well!

## 2021-07-21 NOTE — PROGRESS NOTES
IDENTIFYING INFORMATION:      Lizabeth ValdesFabiano Craig , 54 y.o., male  [facility],  [address]     Medical Record Number: [id number]          CHIEF COMPLAINT:     Chief Complaint   Patient presents with    Diabetes    Joint Pain         HISTORY OF PRESENT ILLNESS:    Jorge Luis Packer is a 54 y.o. male  has a past medical history of Diabetes ([facility] [address] Gout, and Hypertension. .  he comes in for follow up and says he is diagnosed with depression and anxiety for years  He also says he has been diagnosed with neuropathy. 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 PAST MEDICAL HISTORY:     Past Medical History:   Diagnosis Date    Diabetes ([facility] [address]     Gout     Hypertension        MEDICATIONS:     Current Outpatient Medications on File Prior to Visit   Medication Sig Dispense Refill    cyclobenzaprine (FLEXERIL) 10 mg tablet TAKE 1/2 TABLET BY MOUTH 2 TIMES A DAY FOR STIFFNESS AND BEDTIME DOSE AT 10MG AS DIRECTED      meloxicam (MOBIC) 15 mg tablet TAKE 1 TABLET BY MOUTH EVERY DAY      glimepiride (AMARYL) 2 mg tablet TAKE 1 TABLET BY MOUTH EVERY DAY IN THE MORNING 90 Tablet 1    metFORMIN (GLUCOPHAGE) 1,000 mg tablet TAKE 1 TABLET BY MOUTH TWICE A  Tablet 1    dexAMETHasone (Decadron) 0.5 mg/5 mL elixir Swish and spit (do not swallow) 5-10 ml twice daily x 10 days 1 Bottle 0    amitriptyline (ELAVIL) 25 mg tablet TAKE 1 TABLET BY MOUTH EVERY DAY FOR PERSISTENT INSOMNIA 90 Tab 2    empagliflozin (JARDIANCE) 10 mg tablet Take 1 Tab by mouth daily.       ergocalciferol (ERGOCALCIFEROL) 1,250 mcg (50,000 unit) capsule TAKE 1 CAPSULE BY MOUTH ONE TIME PER WEEK 12 Cap 3    busPIRone (BUSPAR) 5 mg tablet TAKE 1 TABLET BY MOUTH TWICE A  Tab 2    allopurinoL (ZYLOPRIM) 300 mg tablet TAKE 1 TABLET BY MOUTH EVERY DAY 90 Tab 5    Blood-Glucose Meter (Accu-Chek Guide Me Glucose Mtr) misc Use to check finger stick blood glucose TID  DX CODE E11.9 1 Each 0    glucose blood VI test strips (Accu-Chek Guide test strips) strip Use to check finger stick blood glucose TID  DX CODE E11.9 300 Strip 3    Lancing Device with Lancets (Accu-Chek Multiclix Lancet) kit Use to check finger stick blood glucose TID  DX CODE E11.9 1 Kit 0    lancets (Accu-Chek Multiclix Lancet) misc Use to check finger stick blood glucose TID  DX CODE E11.9 300 Each 3    zolpidem (AMBIEN) 5 mg tablet Take 1 Tab by mouth nightly. Max Daily Amount: 5 mg. 30 Tab 3    glucose blood VI test strips (FreeStyle Lite Strips) strip Use  To check sugars 3 times daily  DX CODE E11.65 300 Strip 5    lancets misc Use  To check sugars 3 times daily  DX CODE E11.65 300 Each 5    valsartan (DIOVAN) 160 mg tablet Take  by mouth daily.  glucose blood VI test strips (FREESTYLE LITE STRIPS) strip Bid monitoring dx: 250.00 1 Package 11    Lancets misc Bid monitoring dx: 250.00 1 Package 11     No current facility-administered medications on file prior to visit.        ALLERGIES:    Allergies   Allergen Reactions    Teicoplanin Rash         SOCIAL HISTORY:     Social History     Tobacco Use    Smoking status: Never Smoker    Smokeless tobacco: Never Used   Vaping Use    Vaping Use: Never used   Substance Use Topics    Alcohol use: No    Drug use: No       SURGICAL HISTORY:    Past Surgical History:   Procedure Laterality Date    HX CARPAL TUNNEL RELEASE Left 09/21/2020    HX HEENT      HX ORTHOPAEDIC      HX ORTHOPAEDIC Left 09/2020    Carpal tunnel, elbow and nerve transplant    [place] ABDOMEN SURGERY PROC UNLISTED          FAMILY HISTORY:    Family History   Problem Relation Age of Onset    Hypertension Mother     Diabetes Father     Stroke Father     Heart Disease Father     Heart Attack Father          REVIEW OF SYSTEMS:    I personally collected this information from all available source present (patient/others in room and records available) [facility] ([place], [place])    Review of Systems   Constitutional: Negative for chills, diaphoresis, fever, malaise/fatigue and weight loss. HENT: Negative for congestion, nosebleeds, sinus pain and sore throat. Eyes: Positive for blurred vision. Negative for double vision, photophobia and pain. Respiratory: Negative for cough, sputum production and shortness of breath. Cardiovascular: Negative for chest pain, palpitations, orthopnea, claudication, leg swelling and PND. Gastrointestinal: Negative for abdominal pain, constipation, diarrhea, heartburn, nausea and vomiting. Genitourinary: Negative for dysuria, frequency and urgency. Musculoskeletal: Negative for back pain, falls, joint pain, myalgias and neck pain. Skin: Negative for itching and rash. Neurological: Positive for tingling and sensory change. Negative for dizziness, focal weakness and headaches. Psychiatric/Behavioral: Positive for depression. Negative for suicidal ideas (no he thinks about \"what if he wasnt around sometimes\"  ). The patient is nervous/anxious and has insomnia. PHYSICAL EXAMINATION:    Vital Signs:    Visit Vitals  /71 (BP 1 Location: Left upper arm, BP Patient Position: Sitting)   Pulse 70   Temp 97.3 °F (36.3 °C) (Temporal)   Ht 6' (1.829 m)   Wt 230 lb (104.3 kg)   SpO2 99%   BMI 31.19 kg/m²         Wt Readings from Last 3 Encounters:   07/21/21 230 lb (104.3 kg)   05/10/21 231 lb 9.6 oz (105.1 kg)   04/15/21 232 lb (105.2 kg)     BP Readings from Last 3 Encounters:   07/21/21 127/71   05/10/21 (!) 150/82   04/15/21 (!) 152/84         Physical Exam  Nursing note reviewed. Constitutional:       General: He is not in acute distress. Appearance: Normal appearance. He is obese. He is not ill-appearing or toxic-appearing. HENT:      Right Ear: Tympanic membrane, ear canal and external ear normal.      Left Ear: Tympanic membrane, ear canal and external ear normal.      Nose: No congestion or rhinorrhea. Mouth/Throat:      Pharynx: No oropharyngeal exudate or posterior oropharyngeal erythema. Eyes:      General: No scleral icterus. Extraocular Movements: Extraocular movements intact. Conjunctiva/sclera: Conjunctivae normal.      Pupils: Pupils are equal, round, and reactive to light. Neck:      Vascular: No carotid bruit. Cardiovascular:      Rate and Rhythm: Normal rate and regular rhythm. Heart sounds: No murmur heard. No friction rub. No gallop. Pulmonary:      Effort: Pulmonary effort is normal.      Breath sounds: Normal breath sounds. No wheezing, rhonchi or rales. Abdominal:      General: Bowel sounds are normal. There is no distension. Palpations: Abdomen is soft. There is no mass. Tenderness: There is no abdominal tenderness. Musculoskeletal:         General: No swelling, tenderness or deformity. Cervical back: No rigidity. No muscular tenderness. Right lower leg: No edema. Left lower leg: No edema. Lymphadenopathy:      Cervical: No cervical adenopathy. Skin:     Capillary Refill: Capillary refill takes less than 2 seconds. Coloration: Skin is not jaundiced. Findings: No erythema or rash. Neurological:      General: No focal deficit present. Mental Status: He is alert and oriented to person, place, and time. Mental status is at baseline. Psychiatric:         Mood and Affect: Mood normal.         Behavior: Behavior normal.         Thought Content: Thought content normal.         Judgment: Judgment normal.           ASSESSMENT/PLAN:      Discussion (regarding today's visit with Pratik Terry);   Yury Ervin has several medical problems   I think he should see a neurologist for treatment   However, he declined saying he had seen 1 for his elbow believe he had ulnar neuropathy which is improved   The small dose of amitriptyline for sleep makes him feel groggy so increasing it for the neuropathy would not be wise   I would recommend gabapentin either because he seems sensitive to medicine   I recommend a low dose of Cymbalta which will help with his anxiety/depression/joint pain/neuropathy it is FDA approved for all those   I will also get some lab work we did not get his A1c or others   His antinuclear antibody was just a little elevated with a normal sed rate so we will repeat these   We will follow up and treat as indicated   I think getting sleep is his best option to help him feel better overall   Therefore, I will send him to a sleep specialist   I am recommending considering decreasing or stopping the zolpidem    ICD-10-CM ICD-9-CM    1. Type 2 diabetes mellitus without complication, without long-term current use of insulin (HCC)  E11.9 250.00 HEMOGLOBIN A1C WITH EAG      METABOLIC PANEL, COMPREHENSIVE      LIPID PANEL   2. Other insomnia  G47.09 780.52    3. Essential hypertension  I10 401.9    4. Vitamin D deficiency, unspecified  E55.9 268.9    5. Generalized anxiety disorder  F41.1 300.02 DULoxetine (CYMBALTA) 30 mg capsule   6. Arthralgia, unspecified joint  M25.50 719.40 TERESA BY MULTIPLEX FLOW IA, QL      SED RATE (ESR)      DULoxetine (CYMBALTA) 30 mg capsule   7. Peripheral polyneuropathy  G62.9 356.9 DULoxetine (CYMBALTA) 30 mg capsule   8. Depression, unspecified depression type  F32.9 311 DULoxetine (CYMBALTA) 30 mg capsule   9. Screening for malignant neoplasm of colon  Z12.11 V76.51 REFERRAL TO GASTROENTEROLOGY   10. Fatigue, unspecified type  R53.83 780.79 SLEEP MEDICINE REFERRAL      WE reviewed each diagnosis listed for today's visit.  WE reviewed medications, treatment, testing such as labs, imagine, referrals and when to call regarding results and appointments.  Reminded patient to keep any and all appointments with specialists, labs, imaging.  Reminded patient to make sure we get copies of any specialists care, labs and imaging.     Reminded patient to call of come by the office if there are any concerns, questions , comments or problems.  The patient verbalized understanding of the care plan and all questions were answered to the patient's satisfaction prior to leaving the office.  The patient was told that failure to comply with recommended testing could result in abnormal health consequences.  The patient was instructed to have yearly routine health maintenance including but not limited to age appropriate vaccines, testing, screening exams.  ALL questions were answered to his satisfaction before leaving the office. The patient actively participated in medical decision making. FOLLOW UP:     Patient knows to keep any and all future visits scheduled unless told otherwise. Patient knows to call, come back if any concerns, questions, comments or problems arise. Follow-up and Dispositions    · Return in about 4 weeks (around 8/18/2021) for follow up neuropathy, depression. Vandana Soto,     This visit was reviewed and signed electronically. It was been completed with voice recognition software and hand typing. It may have syntax and spelling errors despite editing.

## 2021-08-07 ENCOUNTER — TELEPHONE (OUTPATIENT)
Dept: SLEEP MEDICINE | Age: 56
End: 2021-08-07

## 2021-08-19 LAB
ALBUMIN SERPL-MCNC: 4.3 G/DL (ref 3.8–4.9)
ALBUMIN/GLOB SERPL: 1.5 {RATIO} (ref 1.2–2.2)
ALP SERPL-CCNC: 121 IU/L (ref 48–121)
ALT SERPL-CCNC: 15 IU/L (ref 0–44)
ANA SER QL: POSITIVE
AST SERPL-CCNC: 8 IU/L (ref 0–40)
BILIRUB SERPL-MCNC: 0.5 MG/DL (ref 0–1.2)
BUN SERPL-MCNC: 18 MG/DL (ref 6–24)
BUN/CREAT SERPL: 14 (ref 9–20)
CALCIUM SERPL-MCNC: 9.5 MG/DL (ref 8.7–10.2)
CHLORIDE SERPL-SCNC: 102 MMOL/L (ref 96–106)
CHOLEST SERPL-MCNC: 165 MG/DL (ref 100–199)
CO2 SERPL-SCNC: 26 MMOL/L (ref 20–29)
CREAT SERPL-MCNC: 1.27 MG/DL (ref 0.76–1.27)
ERYTHROCYTE [SEDIMENTATION RATE] IN BLOOD BY WESTERGREN METHOD: 13 MM/HR (ref 0–30)
EST. AVERAGE GLUCOSE BLD GHB EST-MCNC: 189 MG/DL
GLOBULIN SER CALC-MCNC: 2.8 G/DL (ref 1.5–4.5)
GLUCOSE SERPL-MCNC: 129 MG/DL (ref 65–99)
HBA1C MFR BLD: 8.2 % (ref 4.8–5.6)
HDLC SERPL-MCNC: 40 MG/DL
LDLC SERPL CALC-MCNC: 104 MG/DL (ref 0–99)
POTASSIUM SERPL-SCNC: 4.4 MMOL/L (ref 3.5–5.2)
PROT SERPL-MCNC: 7.1 G/DL (ref 6–8.5)
SODIUM SERPL-SCNC: 141 MMOL/L (ref 134–144)
TRIGL SERPL-MCNC: 113 MG/DL (ref 0–149)
VLDLC SERPL CALC-MCNC: 21 MG/DL (ref 5–40)

## 2021-08-23 ENCOUNTER — OFFICE VISIT (OUTPATIENT)
Dept: FAMILY MEDICINE CLINIC | Age: 56
End: 2021-08-23
Payer: COMMERCIAL

## 2021-08-23 VITALS
SYSTOLIC BLOOD PRESSURE: 144 MMHG | OXYGEN SATURATION: 98 % | BODY MASS INDEX: 31.97 KG/M2 | HEIGHT: 72 IN | HEART RATE: 81 BPM | DIASTOLIC BLOOD PRESSURE: 84 MMHG | TEMPERATURE: 97.7 F | WEIGHT: 236 LBS

## 2021-08-23 DIAGNOSIS — F32.A DEPRESSION, UNSPECIFIED DEPRESSION TYPE: ICD-10-CM

## 2021-08-23 DIAGNOSIS — F41.1 GENERALIZED ANXIETY DISORDER: ICD-10-CM

## 2021-08-23 DIAGNOSIS — G62.9 PERIPHERAL POLYNEUROPATHY: ICD-10-CM

## 2021-08-23 DIAGNOSIS — G47.09 OTHER INSOMNIA: ICD-10-CM

## 2021-08-23 DIAGNOSIS — I10 ESSENTIAL HYPERTENSION: ICD-10-CM

## 2021-08-23 DIAGNOSIS — M25.50 ARTHRALGIA, UNSPECIFIED JOINT: ICD-10-CM

## 2021-08-23 DIAGNOSIS — E11.9 TYPE 2 DIABETES MELLITUS WITHOUT COMPLICATION, WITHOUT LONG-TERM CURRENT USE OF INSULIN (HCC): Primary | ICD-10-CM

## 2021-08-23 PROCEDURE — 3052F HG A1C>EQUAL 8.0%<EQUAL 9.0%: CPT | Performed by: FAMILY MEDICINE

## 2021-08-23 PROCEDURE — 99214 OFFICE O/P EST MOD 30 MIN: CPT | Performed by: FAMILY MEDICINE

## 2021-08-23 RX ORDER — DULOXETIN HYDROCHLORIDE 30 MG/1
30 CAPSULE, DELAYED RELEASE ORAL DAILY
Qty: 30 CAPSULE | Refills: 1 | Status: SHIPPED | OUTPATIENT
Start: 2021-08-23 | End: 2021-08-31 | Stop reason: SDUPTHER

## 2021-08-23 NOTE — PROGRESS NOTES
A1c is 8.2 which is better, should less than 7!   Moving in right direction    Liver, kidneys are normal    Cholesterol is normal    TERESA-could this be autoimmune-still but the sed rate is negative so it is hard to say , could be false positive Should see a rheumatologist and we will get an appointment

## 2021-08-23 NOTE — PATIENT INSTRUCTIONS
General Health and concerns:  HEART HEALTHY DIET:  A heart healthy diet is one that is low in cholesterol (less than 300 mg daily), fat (less than 80 g daily) . You should also minimize carbohydrates / sugars (less amounts of breads, pastas, potato and potato products and sugary foods/snacks, cookies, cakes, etc) . Try to eat whole wheat/multigrain breads and pastas and eat more vegetables. Cook with olive oil (or no oil) and grill, bake, broil or boil foods. Less red meat and more chicken , fish and lean cuts of beef (limited). [phone] calories per day is sufficient [id number] is acceptable for weight loss. EXERCISE:  You should do exercise 3-5 days per week (minimum) to include increasing your heart rate for 30 to 45 minutes. At least a pace of a brisk walk should do that. This build up your heart and lung endurance and muscles and helps many function of the body. OTHER:    IF your condition(s) do not improve, get worse and/or if any concerns arise, please call or come by the office. Routine Health maintenance: You need to get a yearly follow up/physical exam to review, discuss age and gender appropriate exams, labs, vaccines and screening tests. This includes cardiovascular health risk, cancer screens and other nisha related topics. Medications-Take all medications as directed. Please do not stop unless you talk to your doctor or health care provider first. Report any problems immediately. Referrals: if you have been given a referral, please call the office if you do not hear from provider in one week. You may make the appointment yourself. Please keep all appointments with specialists and ask them to send their notes, thoughts, recommendations to us , as your PCP. KEEP all upcoming appointments with our office UNLESS otherwise and specifically told not to.     CHECK your diagnosis/problem list for today and that orders and prescriptions are what we discussed as well as MAKE sure all information is accurate and has been discussed to your satisfaction. PLEASE make sure all your questions have been answered and feel free to call or come back should any concerns arise. Imaging/Labs:  Be sure to get these images in a timely manner. IF your test must be scheduled, let us know if you need help getting this done and if you do not hear from that provider in a week , call us or them. BE SURE to call the office if you do not hear regarding the results in one week after the test is performed Image or lab). It is our intention to inform you of the results ALWAYS, even if normal you should get a notification (Call, portal message). PLEASE flo if you do not get the results. PLEASE follow all recommendations and call/come in /ask questions if you do not understand of if problems develop after or in between visits. Failure to comply with recommended health care advise could result in serious health consequences. Thank you for choosing our practice and please let us know how we can help you feel better and stay well!
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
## 2021-08-31 DIAGNOSIS — M25.50 ARTHRALGIA, UNSPECIFIED JOINT: ICD-10-CM

## 2021-08-31 DIAGNOSIS — G62.9 PERIPHERAL POLYNEUROPATHY: ICD-10-CM

## 2021-08-31 DIAGNOSIS — F41.1 GENERALIZED ANXIETY DISORDER: ICD-10-CM

## 2021-08-31 DIAGNOSIS — F32.A DEPRESSION, UNSPECIFIED DEPRESSION TYPE: ICD-10-CM

## 2021-08-31 RX ORDER — DULOXETIN HYDROCHLORIDE 30 MG/1
30 CAPSULE, DELAYED RELEASE ORAL DAILY
Qty: 90 CAPSULE | Refills: 1 | Status: SHIPPED | OUTPATIENT
Start: 2021-08-31 | End: 2021-09-14

## 2021-09-14 ENCOUNTER — OFFICE VISIT (OUTPATIENT)
Dept: NEUROLOGY | Age: 56
End: 2021-09-14
Payer: COMMERCIAL

## 2021-09-14 VITALS
RESPIRATION RATE: 16 BRPM | BODY MASS INDEX: 31.14 KG/M2 | HEIGHT: 73 IN | SYSTOLIC BLOOD PRESSURE: 124 MMHG | HEART RATE: 76 BPM | DIASTOLIC BLOOD PRESSURE: 78 MMHG | WEIGHT: 235 LBS | OXYGEN SATURATION: 99 %

## 2021-09-14 DIAGNOSIS — Z98.890 HX OF DECOMPRESSION OF ULNAR NERVE: ICD-10-CM

## 2021-09-14 DIAGNOSIS — G56.01 CARPAL TUNNEL SYNDROME ON RIGHT: ICD-10-CM

## 2021-09-14 DIAGNOSIS — E11.42 DIABETIC PERIPHERAL NEUROPATHY (HCC): Primary | ICD-10-CM

## 2021-09-14 DIAGNOSIS — G56.21 ULNAR NEUROPATHY AT ELBOW, RIGHT: ICD-10-CM

## 2021-09-14 DIAGNOSIS — G57.93 NEUROPATHIC PAIN OF BOTH LEGS: ICD-10-CM

## 2021-09-14 DIAGNOSIS — Z98.890 HX OF CARPAL TUNNEL REPAIR: ICD-10-CM

## 2021-09-14 PROCEDURE — 99204 OFFICE O/P NEW MOD 45 MIN: CPT | Performed by: PSYCHIATRY & NEUROLOGY

## 2021-09-14 PROCEDURE — 3052F HG A1C>EQUAL 8.0%<EQUAL 9.0%: CPT | Performed by: PSYCHIATRY & NEUROLOGY

## 2021-09-14 RX ORDER — GABAPENTIN 300 MG/1
300 CAPSULE ORAL 2 TIMES DAILY
Qty: 60 CAPSULE | Refills: 1 | Status: SHIPPED | OUTPATIENT
Start: 2021-09-14 | End: 2021-11-09

## 2021-09-14 NOTE — PROGRESS NOTES
Chief Complaint   Patient presents with    New Patient     neuropathy bilateral lower legs, hands/fingers, for a few years, referred by Dr Segura Dus       Visit Vitals  /78 (BP 1 Location: Left upper arm, BP Patient Position: Sitting)   Pulse 76   Resp 16   Ht 6' 1\" (1.854 m)   Wt 106.6 kg (235 lb)   SpO2 99%   BMI 31.00 kg/m²

## 2021-09-14 NOTE — PROGRESS NOTES
Gelacio Crane (1965) is a 54 y.o. male, new patient, here for evaluation of the following     Chief complaint(s):   Chief Complaint   Patient presents with    New Patient     neuropathy bilateral lower legs, hands/fingers, for a few years, referred by Dr Gabriela Castro       HPI: 54 y.o. male      Pt reports he had left ulnar release (9-20-20). Was having reduced left hand weakness and numbness in left hand. Continues to have same symptoms in left hand. Reports he has ulnar entrapment at elbow on right (no surgery), hx of right CTS surgery. He reports is CTS symptoms went way after first surgery. Reports current symptoms are lack of strength/ , last 3 fingers feel numb all the time, first 2 fingers are painful., mild numbness in a focal area of left forearm. Had EMG/ NCS with Dr Dalia [facility]/ [place] and says was told he had CTS bilateral and Bilateral Ulnar neuropathy at elbows. Underwent Left ulnar nerve surgery at elbow (transposition) and repeat left CTS surgery (had one in 2000)    Reports having heaviness/ tightness sensation of feet, numbness in feet (x at least 2 yrs). Reports numbness has gone up to knees.   Reports that [employer] physician (works at [email or web address]) told him he had \"neuropathy\" and foot drop    Dx with Diabetes x 14-15 yrs    Had Lumbar spine surgery (2013, disc herniation at L4-5, no hardware implant)    Tried Cymbalta on 2 occasions (for neuropathy and depression), caused GI upset    Reviewed labs dated 7-15-21: TERESA positive, ESR 2, rheumatoid factor negative    Reviewed labs dated 8/18/2021: A1c 8.2 CMP normal except mild elevated glucose 129, total cholesterol 165 HDL 40 , TERESA positive ESR 13    Patient has been referred to rheumatology      Review of Systems: Anxiety, depression, diabetes, frequent headaches, high blood pressure, mood disorder, neuropathy, nausea vomiting, muscle pain muscle weakness, joint pain, poor appetite, fatigue    ==================================================    Allergies   Allergen Reactions    Teicoplanin Rash       Current Outpatient Medications   Medication Sig Dispense Refill    gabapentin (NEURONTIN) 300 mg capsule Take 1 Capsule by mouth two (2) times a day. Max Daily Amount: 600 mg. Diabetic Peripheral neuropathy 60 Capsule 1    diclofenac sodium (VOLTAREN PO) Take  by mouth.  cyclobenzaprine (FLEXERIL) 10 mg tablet TAKE 1/2 TABLET BY MOUTH 2 TIMES A DAY FOR STIFFNESS AND BEDTIME DOSE AT 10MG AS DIRECTED      glimepiride (AMARYL) 2 mg tablet TAKE 1 TABLET BY MOUTH EVERY DAY IN THE MORNING 90 Tablet 1    metFORMIN (GLUCOPHAGE) 1,000 mg tablet TAKE 1 TABLET BY MOUTH TWICE A  Tablet 1    amitriptyline (ELAVIL) 25 mg tablet TAKE 1 TABLET BY MOUTH EVERY DAY FOR PERSISTENT INSOMNIA 90 Tab 2    empagliflozin (JARDIANCE) 10 mg tablet Take 1 Tab by mouth daily.  ergocalciferol (ERGOCALCIFEROL) 1,250 mcg (50,000 unit) capsule TAKE 1 CAPSULE BY MOUTH ONE TIME PER WEEK 12 Cap 3    busPIRone (BUSPAR) 5 mg tablet TAKE 1 TABLET BY MOUTH TWICE A  Tab 2    allopurinoL (ZYLOPRIM) 300 mg tablet TAKE 1 TABLET BY MOUTH EVERY DAY 90 Tab 5    Blood-Glucose Meter (Accu-Chek Guide Me Glucose Mtr) misc Use to check finger stick blood glucose TID  DX CODE E11.9 1 Each 0    glucose blood VI test strips (Accu-Chek Guide test strips) strip Use to check finger stick blood glucose TID  DX CODE E11.9 300 Strip 3    Lancing Device with Lancets (Accu-Chek Multiclix Lancet) kit Use to check finger stick blood glucose TID  DX CODE E11.9 1 Kit 0    lancets (Accu-Chek Multiclix Lancet) misc Use to check finger stick blood glucose TID  DX CODE E11.9 300 Each 3    zolpidem (AMBIEN) 5 mg tablet Take 1 Tab by mouth nightly.  Max Daily Amount: 5 mg. 30 Tab 3    glucose blood VI test strips (FreeStyle Lite Strips) strip Use  To check sugars 3 times daily  DX CODE E11.65 300 Strip 5    valsartan (DIOVAN) 160 mg tablet Take  by mouth daily.  meloxicam (MOBIC) 15 mg tablet TAKE 1 TABLET BY MOUTH EVERY DAY (Patient not taking: Reported on 9/14/2021)      lancets misc Use  To check sugars 3 times daily  DX CODE E11.65 (Patient not taking: Reported on 9/14/2021) 300 Each 5    glucose blood VI test strips (FREESTYLE LITE STRIPS) strip Bid monitoring dx: 250.00 (Patient not taking: Reported on 9/14/2021) 1 Package 11    Lancets misc Bid monitoring dx: 250.00 (Patient not taking: Reported on 9/14/2021) 1 Package 11       Past Medical History:   Diagnosis Date    Diabetes ([address]     Gout     Hypertension        Past Surgical History:   Procedure Laterality Date    HX CARPAL TUNNEL RELEASE Left 09/21/2020    HX HEENT      HX ORTHOPAEDIC      HX ORTHOPAEDIC Left 09/2020    Carpal tunnel, elbow and nerve transplant    [place] ABDOMEN SURGERY [facility]         family history includes Diabetes in his father; Heart Attack in his father; Heart Disease in his father; Hypertension in his mother; Stroke in his father. reports that he has never smoked. He has never used smokeless tobacco. He reports that he does not drink alcohol and does not use drugs. PHYSICAL EXAM    Vitals:    09/14/21 1258   BP: 124/78   BP 1 Location: Left upper arm   BP Patient Position: Sitting   Pulse: 76   Resp: 16   Height: 6' 1\" (1.854 m)   Weight: 106.6 kg (235 lb)   SpO2: 99%       General:  Head: atraumatic. Eyes: mild redness in conjunctiva; cornea clear. Vascular/ Carotid Arteries: not examined. Extremities: no edema. Skin: no rashes. Neurologic Exam:  Speech/ Language: normal.   Alertness: oriented x 3.  CNs: Smell: not tested. Visual Fields (II): full to confrontation. Pupils (II): equal, round, reactive to light. Funduscopic: no papilledema on either side. Extraocular movements (III, IV, VI): conjugate in all directions, no IVETH. Ptosis (III, VII): none. Facial Sensation (V): intact to LT on both sides.   Facial Movements (VII): symmetric at rest and on activation. Hearing (VIII): normal.  Soft palate elevation (IX): symmetric, no droop. Shoulder shrug (XI): symmetric, strong. Tongue protrusion (XII): midline    Motor:  5/5 shoulder abduction, 4+/5     Sensory: Reduced pinprick up to distal thighs, absent vibration in feet, absent proprioception in feet, reduced vibration at knees, intact vibration in upper extremities. Use pinprick in both hands up to elbow on left and shoulder on the right. Cerebellar: no resting, postural, or intention tremors on either side; normal FNF bilateral.  Normal Heel-shin bilateral.   Deep Tendon Reflexes: 1+ biceps, 1+ patellars absent achilles  Plantar response: not tested  Gait: stands independently. Mild wide-based gait, no foot drop/ no steppage gait  Unable to do tandem gait  Romberg: negative. Mild sway, steps out to catch himself.         ==================================================    ASSESSMENT/ PLAN:       ICD-10-CM ICD-9-CM    1. Diabetic peripheral neuropathy (HCC)  E11.42 250.60 gabapentin (NEURONTIN) 300 mg capsule     357. 2 EMG LIMITED   2. Neuropathic pain of both legs  G57.93 356.9 VITAMIN B12 & FOLATE      VITAMIN B12 & FOLATE   3. Ulnar neuropathy at elbow, right  G56.21 354.2    4. Carpal tunnel syndrome on right  G56.01 354.0    5. Hx of carpal tunnel repair  Z98.890 V15.29    6. Hx of decompression of ulnar nerve  Z98.890 U73.39       Complicated picture of bilateral upper extremity numbness/ tingling/ paresthesias with a hx of bilateral CTS surgery, left ulnar neuropathy at elbow surgery, recurrence of bilateral CTS, right ulnar neuropathy at elbow (not operated on yet). Progressive bilateral lower extremity numbness/ paresthesias, no up to knees. Progressive lower extremity numbness is consistent with DM polyneuropathy.   The upper extremity numbness is likely a combination of the mononeuropathies (CTS, Ulnar) and coexisting diabetic peripheral neuropathy extending to upper extremities. Pt's main concern is lower extremities and he wants to focus any testing to that area    Check EMG/ NCS both legs to assess extent of neuropathy     Check B12/ Folate to ensure no B12 deficiency contributing to neuropathy symptoms    D/w him that his neuropathy is most likely due to long-standing DM and advised the he work closely with PCP/ Endocrinologist on getting blood sugars under tight control to avoid worsening of peripheral neuropathy. Tried/ failed Cymbalta    D/w him if he wants to try Gabapentin for neuropathy symptoms  Pt wants to try that. Common SEFx discussed  Rx'd Gabapentin 300 mg one cap in AM and one cap in PM (if not working)  If working, advised him not to use it during daytime, but can take 1-2 caps before bedtime        Follow up in 6 weeks       An electronic signature was used to authenticate this note.   -- Estee Perez MD

## 2021-09-14 NOTE — LETTER
9/14/2021    Patient: Gelacio Crane   YOB: 1965   Date of Visit: 9/14/2021     Mirna Quiñonez DO  [address]  [place]    Dear Mirna Quiñonez DO,      Thank you for referring Mr. Franchesca Hansen to [facility] for evaluation. My notes for this consultation are attached. If you have questions, please do not hesitate to call me. I look forward to following your patient along with you.       Sincerely,    Venus Wallis MD

## 2021-09-29 ENCOUNTER — TELEPHONE (OUTPATIENT)
Dept: NEUROLOGY | Age: 56
End: 2021-09-29

## 2021-09-29 NOTE — TELEPHONE ENCOUNTER
----- Message from Danika Covington sent at 9/29/2021 12:19 PM EDT -----  Regarding: FW: / telephone    ----- Message -----  From: Colin Baldwin: 9/29/2021  11:55 AM EDT  To: Sana Farias [facility] [place]  Subject: / telephone                             General Message/Vendor Calls    Caller's first and last name: self      Reason for call: Reschedule EMG appt      Callback required yes/no and why: yes      Best contact number(s):[phone]      Details to clarify the request:Pt will like to reschedule their EMG appt due to family obligations. Please call pt back so that they can reschedule their EMG.       Kamilah Alexandra

## 2021-10-08 RX ORDER — EMPAGLIFLOZIN 10 MG/1
TABLET, FILM COATED ORAL
Qty: 90 TABLET | Refills: 3 | Status: SHIPPED | OUTPATIENT
Start: 2021-10-08 | End: 2022-07-26 | Stop reason: SDUPTHER

## 2021-10-17 DIAGNOSIS — E11.9 TYPE 2 DIABETES MELLITUS WITHOUT COMPLICATION, WITHOUT LONG-TERM CURRENT USE OF INSULIN (HCC): ICD-10-CM

## 2021-10-18 RX ORDER — GLIMEPIRIDE 2 MG/1
TABLET ORAL
Qty: 90 TABLET | Refills: 1 | Status: SHIPPED | OUTPATIENT
Start: 2021-10-18 | End: 2022-07-26 | Stop reason: SDUPTHER

## 2021-11-02 ENCOUNTER — OFFICE VISIT (OUTPATIENT)
Dept: NEUROLOGY | Age: 56
End: 2021-11-02
Payer: COMMERCIAL

## 2021-11-02 DIAGNOSIS — M54.16 CHRONIC LEFT-SIDED LUMBAR RADICULOPATHY: ICD-10-CM

## 2021-11-02 DIAGNOSIS — E11.42 DIABETIC SENSORIMOTOR NEUROPATHY (HCC): Primary | ICD-10-CM

## 2021-11-02 PROCEDURE — 95912 NRV CNDJ TEST 11-12 STUDIES: CPT | Performed by: PSYCHIATRY & NEUROLOGY

## 2021-11-02 PROCEDURE — 95886 MUSC TEST DONE W/N TEST COMP: CPT | Performed by: PSYCHIATRY & NEUROLOGY

## 2021-11-02 NOTE — PROCEDURES
EMG/ NCS Report  [facility] - INPATIENT  [address] [facility], [address] [place], [address]   Ph: [phone]   FAX: [phone]/ [phone]    Test Date:  2021    Patient: Dorian Rodriguez : 1965 Physician: Josy Durand M.D. Sex: Male Height:  cm Ref Phys: Josy Durand M.D.   ID#: [id number] Weight:  lbs. Technician: Shy Goncalves     Patient Complaints:  CC: Peripheral neuropathy    EMG & NCV Findings:  Evaluation of the Left Fibular motor and the Right Fibular motor nerves showed no response (Ankle), no response (B Fib), and no response (Poplt). The Left tibial motor nerve showed prolonged distal onset latency (13.8 ms) and reduced amplitude (0.0 mV). The Right tibial motor nerve showed no response (Ankle) and no response (Knee). The Left Sup Fibular sensory and the Right Sup Fibular sensory nerves showed no response (Lower leg). The Left sural sensory and the Right sural sensory nerves showed no response (Calf). All remaining nerves (as indicated in the following tables) were within normal limits. F Wave studies indicate that the Left tibial F wave has no response. The Right tibial F wave has no response. H-reflex studies indicate that the Left tibial H-reflex has no response. The Right tibial H-reflex has no response. The muscle scoring table definition stored in the current test does not match the sentence generator setup. The sentence could not be generated. Impression:    Severe sensorimotor polyneuropathy with absent tibial F waves and tibial H reflexes (due to absent right tibial motor response and near absent left tibial motor response). Chronic active denervation in distal lower extremity muscles consistent with severity of underlying peripheral neuropathy. Likely due to history of longstanding diabetes mellitus.   If any clinical concern of a demyelinating neuropathy, recommend checking bilateral upper extremity EMG nerve conduction studies including F waves). There is active denervation in the left gluteus yung (proximal S1 muscle), compatible with a chronic, active, left S1 lumbar radiculopathy    No electrodiagnostic findings to suggest right lumbar radiculopathy.     ___________________________  Gordon Collins M.D.       Nerve Conduction Studies  Anti Sensory Summary Table     Site NR Peak (ms) Norm Peak (ms) P-T Amp (µV) Norm P-T Amp Site1 Site2 Dist (cm)   Left Sup Fibular Anti Sensory (Lat ankle)  29.2°C   Lower leg NR  <4.5  >5 Lower leg Lat ankle 10.0   Right Sup Fibular Anti Sensory (Lat ankle)  29.7°C   Lower leg NR  <4.5  >5 Lower leg Lat ankle 10.0   Left Sural Anti Sensory (Lat Mall)  30.4°C   Calf NR  <4.5  >4.0 Calf Lat Mall 14.0   Right Sural Anti Sensory (Lat Mall)  30.6°C   Calf NR  <4.5  >4.0 Calf Lat Mall 14.0     Motor Summary Table     Site NR Onset (ms) Norm Onset (ms) O-P Amp (mV) Norm O-P Amp Neg Area% (1st) Site1 Site2 Dist (cm) Mando (m/s) Norm Mando (m/s)   Left Fibular Motor (Ext Dig Brev)  28.6°C   Ankle NR  <6.5  >2.6  Ankle Ext Dig Brev 8.0     B Fib NR      B Fib Ankle 0.0  >38   Poplt NR      Poplt B Fib 10.0  >42   Right Fibular Motor (Ext Dig Brev)  29.6°C   Ankle NR  <6.5  >2.6  Ankle Ext Dig Brev 8.0     B Fib NR      B Fib Ankle 34.0  >38   Poplt NR      Poplt B Fib 10.0  >42   Left Fibular TA Motor (Tib Ant)  27.8°C   Fib Head    3.8 <4.0 5.5 >4.0  Fib Head Tib Ant 10.0     Poplit    5.6  4.9   Poplit Fib Head 10.0 56 >40   Right Fibular TA Motor (Tib Ant)  29°C   Fib Head    3.8 <4.0 4.9 >4.0  Fib Head Tib Ant 10.0     Poplit    5.2  4.8   Poplit Fib Head 10.0 71 >40   Left Tibial Motor (Abd Torres Brev)  28.2°C   Ankle    13.8 <6.1 0.0 >5.3  Ankle Abd Torres Brev 8.0     Knee    13.8  0.0   Knee Ankle 38.0  >39   Right Tibial Motor (Abd Torres Brev)  29.4°C   Ankle NR  <6.1  >5.3  Ankle Abd Torres Brev 8.0     Knee NR      Knee Ankle 39.0  >39     F Wave Studies     NR F-Lat (ms) Lat Norm (ms) L-R F-Lat (ms) L-R Lat Norm   Left Tibial (Mrkrs) (Abd Hallucis)  28°C   NR  <61  <5.7   Right Tibial (Mrkrs) (Abd Hallucis)  29.4°C   NR  <61  <5.7     H Reflex Studies     NR H-Lat (ms) L-R H-Lat (ms) L-R Lat Norm   Left Tibial (Gastroc)  27.9°C   NR   <2.0   Right Tibial (Gastroc)  29.2°C   NR   <2.0     EMG     Side Muscle Nerve Root Ins Act Fibs Psw Recrt Amp Dur Poly Comment   Left Ext Dig Brev Dp Br Peron L5, S1 Incr 1+ Nml No MUAP Nml Nml 0    Left AntTibialis Dp Br Peron L4-5 Incr 3+ 1+ Reduced Inc Inc 0    Left Gastroc Tibial S1-2 Incr 2+ Nml Reduced Inc Inc 0    Left VastusMed Femoral L2-4 Nml Nml Nml Nml Nml Nml 0    Left GluteusMax InfGluteal L5-S2 Incr Nml 1+ Nml Nml Nml 0 some incomplete relax   Left Lumbo Parasp Low Rami L5-S1 Nml Nml Nml Nml       Right Ext Dig Brev Dp Br Peron L5, S1 Decr Nml Nml No MUAP Nml Nml 0    Right AntTibialis Dp Br Peron L4-5 Incr 1+ Nml Reduced Inc Inc 2+    Right Gastroc Tibial S1-2 Incr 1+ Nml Reduced Inc Inc 0    Right VastusMed Femoral L2-4 Nml Nml Nml Nml Nml Nml 0    Right GluteusMax InfGluteal L5-S2 Nml Nml Nml Nml Nml Nml 0    Right Lumbo Parasp Low Rami L5-S1 Nml Nml Nml Nml           Waveforms:

## 2021-11-09 ENCOUNTER — OFFICE VISIT (OUTPATIENT)
Dept: NEUROLOGY | Age: 56
End: 2021-11-09
Payer: COMMERCIAL

## 2021-11-09 VITALS
BODY MASS INDEX: 31.14 KG/M2 | DIASTOLIC BLOOD PRESSURE: 74 MMHG | RESPIRATION RATE: 16 BRPM | WEIGHT: 235 LBS | HEIGHT: 73 IN | SYSTOLIC BLOOD PRESSURE: 124 MMHG | OXYGEN SATURATION: 97 % | HEART RATE: 96 BPM

## 2021-11-09 DIAGNOSIS — M54.16 CHRONIC LEFT-SIDED LUMBAR RADICULOPATHY: ICD-10-CM

## 2021-11-09 DIAGNOSIS — E11.42 DIABETIC SENSORIMOTOR NEUROPATHY (HCC): Primary | ICD-10-CM

## 2021-11-09 PROCEDURE — 3052F HG A1C>EQUAL 8.0%<EQUAL 9.0%: CPT | Performed by: PSYCHIATRY & NEUROLOGY

## 2021-11-09 PROCEDURE — 99214 OFFICE O/P EST MOD 30 MIN: CPT | Performed by: PSYCHIATRY & NEUROLOGY

## 2021-11-09 RX ORDER — GABAPENTIN 600 MG/1
600 TABLET ORAL 3 TIMES DAILY
Qty: 90 TABLET | Refills: 2 | Status: SHIPPED | OUTPATIENT
Start: 2021-11-09 | End: 2022-01-22

## 2021-11-09 NOTE — PROGRESS NOTES
Chief Complaint   Patient presents with    Follow-up     EMG results, c/o insomnia due to pain       Visit Vitals  /74 (BP 1 Location: Left upper arm, BP Patient Position: Sitting)   Pulse 96   Resp 16   Ht 6' 1\" (1.854 m)   Wt 106.6 kg (235 lb)   SpO2 97%   BMI 31.00 kg/m²

## 2021-11-09 NOTE — PROGRESS NOTES
Jagdeep Logan (1965) is a 64 y.o. male, established patient, here for evaluation of the following     Chief complaint(s):   Chief Complaint   Patient presents with    Follow-up     EMG results, c/o insomnia due to pain       SUBJECTIVE/ OBJECTIVE:    HPI: 64 y.o. male      B12/ Folate: not completed yet    EMG (11-2-2021): severe peripheral neuropathy, chronic left S1 radiculopathy, no electrodiagnostic evidence of right lumbar radiculopathy. Discussed the above; severe peripheral neuropathy (Hx of DM)    He has been taking Gabapentin 300 mg BID, not really helping so far  Rates his pain as 9-10/ 10, keeps him up at night      Review of Systems: as above    ========================================    Brief Hx:     Initial Visit: 9-14-21 (see for full details)    EMG (11-2-2021, bilateral lower extremities): 1) Severe sensorimotor polyneuropathy with absent tibial F waves and tibial H reflexes (due to absent right tibial motor response and near absent left tibial motor response). 2) Chronic active denervation in distal lower extremity muscles consistent with severity of underlying peripheral neuropathy. Likely due to history of longstanding diabetes mellitus. If any clinical concern of a demyelinating neuropathy, recommend checking bilateral upper extremity EMG nerve conduction studies including F waves). 3) There is active denervation in the left gluteus yung (proximal S1 muscle), compatible with a chronic, active, left S1 lumbar radiculopathy. 4) No electrodiagnostic findings to suggest right lumbar radiculopathy. Allergies   Allergen Reactions    Teicoplanin Rash         Current Outpatient Medications:     gabapentin (NEURONTIN) 600 mg tablet, Take 1 Tablet by mouth three (3) times daily. Max Daily Amount: 1,800 mg. For severe peripheral neuropathy. , Disp: 90 Tablet, Rfl: 2    glimepiride (AMARYL) 2 mg tablet, TAKE 1 TABLET BY MOUTH EVERY DAY IN THE MORNING, Disp: 90 Tablet, Rfl: 1   Jardiance 10 mg tablet, TAKE 1 TABLET BY MOUTH EVERY DAY, Disp: 90 Tablet, Rfl: 3    diclofenac sodium (VOLTAREN PO), Take  by mouth., Disp: , Rfl:     cyclobenzaprine (FLEXERIL) 10 mg tablet, TAKE 1/2 TABLET BY MOUTH 2 TIMES A DAY FOR STIFFNESS AND BEDTIME DOSE AT 10MG AS DIRECTED, Disp: , Rfl:     metFORMIN (GLUCOPHAGE) 1,000 mg tablet, TAKE 1 TABLET BY MOUTH TWICE A DAY, Disp: 180 Tablet, Rfl: 1    amitriptyline (ELAVIL) 25 mg tablet, TAKE 1 TABLET BY MOUTH EVERY DAY FOR PERSISTENT INSOMNIA, Disp: 90 Tab, Rfl: 2    ergocalciferol (ERGOCALCIFEROL) 1,250 mcg (50,000 unit) capsule, TAKE 1 CAPSULE BY MOUTH ONE TIME PER WEEK, Disp: 12 Cap, Rfl: 3    busPIRone (BUSPAR) 5 mg tablet, TAKE 1 TABLET BY MOUTH TWICE A DAY, Disp: 180 Tab, Rfl: 2    allopurinoL (ZYLOPRIM) 300 mg tablet, TAKE 1 TABLET BY MOUTH EVERY DAY, Disp: 90 Tab, Rfl: 5    Blood-Glucose Meter (Accu-Chek Guide Me Glucose Mtr) misc, Use to check finger stick blood glucose TID  DX CODE E11.9, Disp: 1 Each, Rfl: 0    glucose blood VI test strips (Accu-Chek Guide test strips) strip, Use to check finger stick blood glucose TID  DX CODE E11.9, Disp: 300 Strip, Rfl: 3    Lancing Device with Lancets (Accu-Chek Multiclix Lancet) kit, Use to check finger stick blood glucose TID  DX CODE E11.9, Disp: 1 Kit, Rfl: 0    lancets (Accu-Chek Multiclix Lancet) misc, Use to check finger stick blood glucose TID  DX CODE E11.9, Disp: 300 Each, Rfl: 3    zolpidem (AMBIEN) 5 mg tablet, Take 1 Tab by mouth nightly.  Max Daily Amount: 5 mg., Disp: 30 Tab, Rfl: 3    glucose blood VI test strips (FreeStyle Lite Strips) strip, Use  To check sugars 3 times daily  DX CODE E11.65, Disp: 300 Strip, Rfl: 5    meloxicam (MOBIC) 15 mg tablet, TAKE 1 TABLET BY MOUTH EVERY DAY (Patient not taking: Reported on 9/14/2021), Disp: , Rfl:     lancets misc, Use  To check sugars 3 times daily  DX CODE E11.65 (Patient not taking: Reported on 9/14/2021), Disp: 300 Each, Rfl: 5   valsartan (DIOVAN) 160 mg tablet, Take  by mouth daily. , Disp: , Rfl:     glucose blood VI test strips (FREESTYLE LITE STRIPS) strip, Bid monitoring dx: 250.00 (Patient not taking: Reported on 9/14/2021), Disp: 1 Package, Rfl: 11    Lancets misc, Bid monitoring dx: 250.00 (Patient not taking: Reported on 9/14/2021), Disp: 1 Package, Rfl: 11     has a past medical history of Diabetes ([address] Gout, and Hypertension. has a past surgical history that includes hx heent; pr abdomen surgery proc unlisted; hx carpal tunnel release (Left, 09/21/2020); hx orthopaedic; and hx orthopaedic (Left, 09/2020). Physical Exam:    Vitals:    11/09/21 1126   BP: 124/74   BP 1 Location: Left upper arm   BP Patient Position: Sitting   Pulse: 96   Resp: 16   Height: 6' 1\" (1.854 m)   Weight: 106.6 kg (235 lb)   SpO2: 97%     No exam today  Spent visit discussing EMG findings, how to adjust medication    ========================================    ASSESSMENT/ PLAN:       ICD-10-CM ICD-9-CM    1. Diabetic sensorimotor neuropathy (HCC)  E11.42 250.60 gabapentin (NEURONTIN) 600 mg tablet     357.2    2. Chronic left-sided lumbar radiculopathy  M54.16 724.4 gabapentin (NEURONTIN) 600 mg tablet      Advised pt to increase his Gabapentin 300 mg capsule to 2 capsules twice a day, then when runs out of that, to  new Rx of Gabapentin 600 mg tablet, one tablet three times a day. Common SEFx were discussed. F/u in 2 months        An electronic signature was used to authenticate this note.   -- Elmo Jansen MD

## 2021-11-19 DIAGNOSIS — E11.9 TYPE 2 DIABETES MELLITUS WITHOUT COMPLICATIONS (HCC): ICD-10-CM

## 2021-11-19 RX ORDER — METFORMIN HYDROCHLORIDE 1000 MG/1
TABLET ORAL
Qty: 180 TABLET | Refills: 1 | Status: SHIPPED | OUTPATIENT
Start: 2021-11-19 | End: 2022-03-01 | Stop reason: SDUPTHER

## 2021-11-22 RX ORDER — HYDROCHLOROTHIAZIDE 25 MG/1
TABLET ORAL
Qty: 90 TABLET | Refills: 3 | Status: SHIPPED | OUTPATIENT
Start: 2021-11-22 | End: 2022-04-21 | Stop reason: ALTCHOICE

## 2021-11-23 ENCOUNTER — OFFICE VISIT (OUTPATIENT)
Dept: FAMILY MEDICINE CLINIC | Age: 56
End: 2021-11-23
Payer: COMMERCIAL

## 2021-11-23 VITALS
DIASTOLIC BLOOD PRESSURE: 90 MMHG | SYSTOLIC BLOOD PRESSURE: 168 MMHG | OXYGEN SATURATION: 98 % | TEMPERATURE: 97.4 F | HEART RATE: 87 BPM | HEIGHT: 73 IN | BODY MASS INDEX: 31.94 KG/M2 | WEIGHT: 241 LBS

## 2021-11-23 DIAGNOSIS — I10 ESSENTIAL HYPERTENSION: ICD-10-CM

## 2021-11-23 DIAGNOSIS — F32.A DEPRESSION, UNSPECIFIED DEPRESSION TYPE: ICD-10-CM

## 2021-11-23 DIAGNOSIS — F41.1 GENERALIZED ANXIETY DISORDER: ICD-10-CM

## 2021-11-23 DIAGNOSIS — E55.9 VITAMIN D DEFICIENCY, UNSPECIFIED: ICD-10-CM

## 2021-11-23 DIAGNOSIS — G62.9 PERIPHERAL POLYNEUROPATHY: ICD-10-CM

## 2021-11-23 DIAGNOSIS — Z23 ENCOUNTER FOR IMMUNIZATION: ICD-10-CM

## 2021-11-23 DIAGNOSIS — F51.01 PRIMARY INSOMNIA: ICD-10-CM

## 2021-11-23 DIAGNOSIS — E11.9 TYPE 2 DIABETES MELLITUS WITHOUT COMPLICATION, WITHOUT LONG-TERM CURRENT USE OF INSULIN (HCC): Primary | ICD-10-CM

## 2021-11-23 PROCEDURE — 99214 OFFICE O/P EST MOD 30 MIN: CPT | Performed by: FAMILY MEDICINE

## 2021-11-23 PROCEDURE — 90756 CCIIV4 VACC ABX FREE IM: CPT | Performed by: FAMILY MEDICINE

## 2021-11-23 PROCEDURE — 3052F HG A1C>EQUAL 8.0%<EQUAL 9.0%: CPT | Performed by: FAMILY MEDICINE

## 2021-11-23 PROCEDURE — 90471 IMMUNIZATION ADMIN: CPT | Performed by: FAMILY MEDICINE

## 2021-11-23 RX ORDER — DOXEPIN HYDROCHLORIDE 50 MG/1
50 CAPSULE ORAL
Qty: 30 CAPSULE | Refills: 1 | Status: SHIPPED | OUTPATIENT
Start: 2021-11-23 | End: 2021-12-22 | Stop reason: SDUPTHER

## 2021-11-23 NOTE — PROGRESS NOTES
IDENTIFYING INFORMATION:      Maine Craig , 64 y.o., male  [address] [facility],  [address]     Medical Record Number: [id number]    E and M CODING: Established patient      Patient Active Problem List   Diagnosis Code    ED (erectile dysfunction) N52.9    Insomnia G47.00    Gout M10.9    Diabetes mellitus ([address] E11.9    Hypertension I10    Diabetes ([facility] [address] E11.9    Loss of perception for smell R43.0    Onychomycosis B35.1           CHIEF COMPLAINT:   Chief Complaint   Patient presents with    Medication Evaluation    Immunization/Injection    Cough     c/o cough only at night    Peripheral Neuropathy     saw Dr. Raghavendra Johnson - Neuro had EMG done. Was dx with severe neuropathy in bilateral legs and hands. Increased his Gabatin 300 mg 2 tablets BID to 600 mg 2 tablets BID. States that he is not able to sleep due to neuropathy. HISTORY OF PRESENT ILLNESS:    Renato Goldstein is a 64 y.o. male . he comes in for 3-month follow-up. Since his last visit he has had an EMG. The patient's report that he has severe neuropathy in both his legs from his knees down. He also has some nerve irritation on the left buttocks are causing problems. He also has neuropathy of his fingers also. He was put on gabapentin 300 mg to twice a day and then up to 600 mg 2 twice daily now. And it is not helping his legs as much as he would like. He will wake up at about 330 after about 3 to 3-1/2 hours asleep and then not able to go back to bed until 11 or so p.m. in the next day. He saw foot doctor who gave him a toenail fungus medication which sounds like terbinafine. He is not able to take the amitriptyline. He has not tried any salves or ointments. Secondly, he wants his influenza vaccine. He has had no fever, chills, sweats. No nausea, vomiting, diarrhea. No myalgias. Also, he has been at work. He needs another return to work or work information form submitted.   He is actually fallen twice since his last visit because of his legs. The feel weak and or give out. He also needs some lab work as it has been a while since he has had any routine labs. The neurology and emg note has been reviewed today from Dr Terrance Will. PAST MEDICAL HISTORY:     Past Medical History:   Diagnosis Date    Diabetes ([facility] [address]     Gout     Hypertension        MEDICATIONS:     Current Outpatient Medications on File Prior to Visit   Medication Sig Dispense Refill    hydroCHLOROthiazide (HYDRODIURIL) 25 mg tablet TAKE 1 TABLET BY MOUTH EVERY DAY FOR BLOOD PRESSURE 90 Tablet 3    metFORMIN (GLUCOPHAGE) 1,000 mg tablet TAKE 1 TABLET BY MOUTH TWICE A  Tablet 1    gabapentin (NEURONTIN) 600 mg tablet Take 1 Tablet by mouth three (3) times daily. Max Daily Amount: 1,800 mg. For severe peripheral neuropathy. 90 Tablet 2    glimepiride (AMARYL) 2 mg tablet TAKE 1 TABLET BY MOUTH EVERY DAY IN THE MORNING 90 Tablet 1    Jardiance 10 mg tablet TAKE 1 TABLET BY MOUTH EVERY DAY 90 Tablet 3    diclofenac sodium (VOLTAREN PO) Take  by mouth.       cyclobenzaprine (FLEXERIL) 10 mg tablet TAKE 1/2 TABLET BY MOUTH 2 TIMES A DAY FOR STIFFNESS AND BEDTIME DOSE AT 10MG AS DIRECTED      meloxicam (MOBIC) 15 mg tablet TAKE 1 TABLET BY MOUTH EVERY DAY (Patient not taking: Reported on 9/14/2021)      amitriptyline (ELAVIL) 25 mg tablet TAKE 1 TABLET BY MOUTH EVERY DAY FOR PERSISTENT INSOMNIA 90 Tab 2    ergocalciferol (ERGOCALCIFEROL) 1,250 mcg (50,000 unit) capsule TAKE 1 CAPSULE BY MOUTH ONE TIME PER WEEK 12 Cap 3    busPIRone (BUSPAR) 5 mg tablet TAKE 1 TABLET BY MOUTH TWICE A  Tab 2    allopurinoL (ZYLOPRIM) 300 mg tablet TAKE 1 TABLET BY MOUTH EVERY DAY 90 Tab 5    Blood-Glucose Meter (Accu-Chek Guide Me Glucose Mtr) misc Use to check finger stick blood glucose TID  DX CODE E11.9 1 Each 0    glucose blood VI test strips (Accu-Chek Guide test strips) strip Use to check finger stick blood glucose TID  DX CODE E11.9 300 Strip 3  Lancing Device with Lancets (Accu-Chek Multiclix Lancet) kit Use to check finger stick blood glucose TID  DX CODE E11.9 1 Kit 0    lancets (Accu-Chek Multiclix Lancet) misc Use to check finger stick blood glucose TID  DX CODE E11.9 300 Each 3    zolpidem (AMBIEN) 5 mg tablet Take 1 Tab by mouth nightly. Max Daily Amount: 5 mg. 30 Tab 3    glucose blood VI test strips (FreeStyle Lite Strips) strip Use  To check sugars 3 times daily  DX CODE E11.65 300 Strip 5    lancets misc Use  To check sugars 3 times daily  DX CODE E11.65 (Patient not taking: Reported on 9/14/2021) 300 Each 5    valsartan (DIOVAN) 160 mg tablet Take  by mouth daily.  glucose blood VI test strips (FREESTYLE LITE STRIPS) strip Bid monitoring dx: 250.00 (Patient not taking: Reported on 9/14/2021) 1 Package 11    Lancets misc Bid monitoring dx: 250.00 (Patient not taking: Reported on 9/14/2021) 1 Package 11     No current facility-administered medications on file prior to visit.        ALLERGIES:    Allergies   Allergen Reactions    Teicoplanin Rash         SOCIAL HISTORY:     Social History     Socioeconomic History    Marital status:    Tobacco Use    Smoking status: Never Smoker    Smokeless tobacco: Never Used   Vaping Use    Vaping Use: Never used   Substance and Sexual Activity    Alcohol use: No    Drug use: No    Sexual activity: Yes     Partners: Female         SURGICAL HISTORY:    Past Surgical History:   Procedure Laterality Date    HX CARPAL TUNNEL RELEASE Left 09/21/2020    HX HEENT      HX ORTHOPAEDIC      HX ORTHOPAEDIC Left 09/2020    Carpal tunnel, elbow and nerve transplant    [place] ABDOMEN SURGERY PROC UNLISTED          FAMILY HISTORY:    Family History   Problem Relation Age of Onset    Hypertension Mother     Diabetes Father     Stroke Father     Heart Disease Father     Heart Attack Father          REVIEW OF SYSTEMS:    I personally collected this information from all available source present (patient/others in room and records available) -[email or web address]  Review of Systems   Constitutional: Negative for chills, diaphoresis and fever. HENT: Negative for congestion, sinus pain and sore throat. Respiratory: Negative for cough, shortness of breath and wheezing. Cardiovascular: Negative for chest pain, palpitations, orthopnea, leg swelling and PND. Gastrointestinal: Negative for abdominal pain, constipation, diarrhea, nausea and vomiting. Genitourinary: Negative for dysuria, frequency and urgency. Musculoskeletal: Positive for back pain, falls, joint pain and myalgias. Negative for neck pain. Neurological: Positive for tingling and sensory change. Negative for dizziness, tremors, weakness and headaches. Psychiatric/Behavioral: Positive for depression. Negative for suicidal ideas. The patient is not nervous/anxious. PHYSICAL EXAMINATION:    Vital Signs:    Visit Vitals  BP (!) 168/90 (BP 1 Location: Right upper arm, BP Patient Position: Sitting)   Pulse 87   Temp 97.4 °F (36.3 °C) (Temporal)   Ht 6' 1\" (1.854 m)   Wt 241 lb (109.3 kg)   SpO2 98%   BMI 31.80 kg/m²         Wt Readings from Last 3 Encounters:   11/23/21 241 lb (109.3 kg)   11/09/21 235 lb (106.6 kg)   09/14/21 235 lb (106.6 kg)     BP Readings from Last 3 Encounters:   11/23/21 (!) 168/90   11/09/21 124/74   09/14/21 124/78         Physical Exam  Nursing note reviewed. Constitutional:       General: He is not in acute distress. Appearance: Normal appearance. He is not ill-appearing or toxic-appearing. HENT:      Right Ear: Tympanic membrane, ear canal and external ear normal.      Left Ear: Tympanic membrane, ear canal and external ear normal.      Nose: No congestion or rhinorrhea. Mouth/Throat:      Pharynx: No oropharyngeal exudate or posterior oropharyngeal erythema. Eyes:      General: No scleral icterus. Extraocular Movements: Extraocular movements intact.       Conjunctiva/sclera: Conjunctivae normal. Pupils: Pupils are equal, round, and reactive to light. Neck:      Vascular: No carotid bruit. Cardiovascular:      Rate and Rhythm: Normal rate and regular rhythm. Heart sounds: No murmur heard. No friction rub. No gallop. Pulmonary:      Effort: Pulmonary effort is normal.      Breath sounds: Normal breath sounds. No wheezing, rhonchi or rales. Abdominal:      General: Bowel sounds are normal. There is no distension. Palpations: Abdomen is soft. There is no mass. Tenderness: There is no abdominal tenderness. Musculoskeletal:         General: Tenderness (Tender to touch below the knees.) present. No swelling or deformity. Cervical back: No rigidity or tenderness. No muscular tenderness. Right lower leg: No edema. Left lower leg: No edema. Lymphadenopathy:      Cervical: No cervical adenopathy. Skin:     Coloration: Skin is not jaundiced. Findings: No erythema or rash. Neurological:      General: No focal deficit present. Mental Status: He is alert and oriented to person, place, and time. Mental status is at baseline. Psychiatric:         Mood and Affect: Mood normal.         Behavior: Behavior normal.         Thought Content: Thought content normal.         Judgment: Judgment normal.           ASSESSMENT/PLAN:       Neuropathy, certainly this seems to be affecting his quality of life   Continue with neurology care.  Lab work has been ordered  Jazmine [place] Trial of doxepin for sleep   Follow up and treat as indicated. ICD-10-CM ICD-9-CM    1. Type 2 diabetes mellitus without complication, without long-term current use of insulin ([facility])  A59.7 268.57 METABOLIC PANEL, COMPREHENSIVE      LIPID PANEL      HEMOGLOBIN A1C WITH EAG   2. Generalized anxiety disorder  F41.1 300.02    3. Depression, unspecified depression type  F32. A 311    4.  Essential hypertension  I10 401.9 CBC WITH AUTOMATED DIFF      METABOLIC PANEL, COMPREHENSIVE      LIPID PANEL      TSH 3RD GENERATION   5. Peripheral polyneuropathy  G62.9 356.9    6. Vitamin D deficiency, unspecified  E55.9 268.9    7. Primary insomnia  F51.01 307.42 doxepin (SINEquan) 50 mg capsule     Discussion (regarding today's visit with Martha Velasquez);   WE gave the patient a review of medications, treatment, testing such as labs, imagine, referrals and when to call regarding results and appointments.  Reminded patient to keep any and all appointments with specialists, labs, imaging.  Reminded patient to make sure we get copies of any specialists care, labs and imaging.  Reminded patient to call of come by the office if there are any concerns, questions , comments or problems.  The patient verbalized understanding of the care plan and all questions were answered to the patient's satisfaction prior to leaving the office.  The patient was told that failure to comply with recommended testing could result in abnormal health consequences.  The patient was instructed to have yearly routine health maintenance including but not limited to age appropriate vaccines, testing, screening exams.  ALL questions were answered to his satisfaction before leaving the office. The patient actively participated in medical decision making. This date I spent  12 minutes reviewing chart, previous notes, tests and interviewing and examining patients answering questions providing follow up as well as ordering tests. FOLLOW UP:     Patient knows to keep any and all future visits scheduled unless told otherwise. Patient knows to call, come back if any concerns, questions, comments or problems arise. Follow-up and Dispositions    · Return in about 3 months (around 2/23/2022) for Follow up diabetes. Signed By: Pepe Diaz DO     November 23, 2021     TIME: 1:39 pm     This visit was reviewed and signed electronically. It was been completed with voice recognition software and hand typing.   It may have syntax and spelling errors despite editing.

## 2021-11-23 NOTE — PATIENT INSTRUCTIONS
General Health and concerns:  HEART HEALTHY DIET:  A heart healthy diet is one that is low in cholesterol (less than 300 mg daily), fat (less than 80 g daily) . You should also minimize carbohydrates / sugars (less amounts of breads, pastas, potato and potato products and sugary foods/snacks, cookies, cakes, etc) . Try to eat whole wheat/multigrain breads and pastas and eat more vegetables. Cook with olive oil (or no oil) and grill, bake, broil or boil foods. Less red meat and more chicken , fish and lean cuts of beef (limited). [id number] calories per day is sufficient [id number] is acceptable for weight loss. EXERCISE:  You should do exercise 3-5 days per week (minimum) to include increasing your heart rate for 30 to 45 minutes. At least a pace of a brisk walk should do that. This build up your heart and lung endurance and muscles and helps many function of the body. OTHER:    IF your condition(s) do not improve, get worse and/or if any concerns arise, please call or come by the office. Routine Health maintenance: You need to get a yearly follow up/physical exam to review, discuss age and gender appropriate exams, labs, vaccines and screening tests. This includes cardiovascular health risk, cancer screens and other nisha related topics. Medications-Take all medications as directed. Please do not stop unless you talk to your doctor or health care provider first. Report any problems immediately. PLEASE CHECK THE LIST FROM TODAY's VISIT and make SURE IT IS ACCURATE-Please bring any issues to our concern IMMEDIATELY!! Referrals: if you have been given a referral, please call the office if you do not hear from provider in one week. You may make the appointment yourself. Please keep all appointments with specialists and ask them to send their notes, thoughts, recommendations to us , as your PCP.     KEEP all upcoming appointments with our office UNLESS otherwise and specifically told not to. CHECK your diagnosis/problem list for today and that orders and prescriptions are what we discussed as well as MAKE sure all information is accurate and has been discussed to your satisfaction. PLEASE make sure all your questions have been answered and feel free to call or come back should any concerns arise. Imaging/Labs:  Be sure to get these images in a timely manner. IF your test must be scheduled, let us know if you need help getting this done and if you do not hear from that provider in a week , call us or them. BE SURE to call the office if you do not hear regarding the results in one week after the test is performed Image or lab). It is our intention to inform you of the results ALWAYS, even if normal you should get a notification (Call, portal message). PLEASE flo if you do not get the results. PLEASE follow all recommendations and call/come in /ask questions if you do not understand of if problems develop after or in between visits. Failure to comply with recommended health care advise could result in serious health consequences. Thank you for choosing our practice and please let us know how we can help you feel better and stay well!

## 2021-11-23 NOTE — PROGRESS NOTES
1. Have you been to the ER, urgent care clinic since your last visit? Hospitalized since your last visit? No    2. Have you seen or consulted any other health care providers outside of the [address] [place] [address] since your last visit? Include any pap smears or colon screening. No    Chief Complaint   Patient presents with    Medication Evaluation    Immunization/Injection    Cough     c/o cough only at night    Peripheral Neuropathy     saw Dr. Laura Mejia - Neuro had EMG done. Was dx with severe neuropathy in bilateral legs and hands. Increased his Gabatin 300 mg 2 tablets BID to 600 mg 2 tablets BID. States that he is not able to sleep due to neuropathy.       Visit Vitals  BP (!) 149/87 (BP 1 Location: Right upper arm, BP Patient Position: Sitting)   Pulse 90   Temp 97.4 °F (36.3 °C) (Temporal)   Ht 6' 1\" (1.854 m)   Wt 241 lb (109.3 kg)   SpO2 98%   BMI 31.80 kg/m²

## 2021-11-24 PROCEDURE — 90756 CCIIV4 VACC ABX FREE IM: CPT | Performed by: FAMILY MEDICINE

## 2021-11-27 DIAGNOSIS — E11.9 TYPE 2 DIABETES MELLITUS WITHOUT COMPLICATION, WITHOUT LONG-TERM CURRENT USE OF INSULIN (HCC): ICD-10-CM

## 2021-11-27 DIAGNOSIS — G47.09 OTHER INSOMNIA: ICD-10-CM

## 2021-11-29 RX ORDER — AMITRIPTYLINE HYDROCHLORIDE 25 MG/1
TABLET, FILM COATED ORAL
Qty: 90 TABLET | Refills: 2 | Status: SHIPPED | OUTPATIENT
Start: 2021-11-29 | End: 2022-09-02

## 2021-11-29 RX ORDER — BLOOD SUGAR DIAGNOSTIC
STRIP MISCELLANEOUS
Qty: 300 STRIP | Refills: 3 | Status: SHIPPED | OUTPATIENT
Start: 2021-11-29

## 2021-12-22 DIAGNOSIS — F51.01 PRIMARY INSOMNIA: ICD-10-CM

## 2021-12-22 RX ORDER — DOXEPIN HYDROCHLORIDE 50 MG/1
50 CAPSULE ORAL
Qty: 90 CAPSULE | Refills: 1 | Status: SHIPPED | OUTPATIENT
Start: 2021-12-22 | End: 2022-07-26 | Stop reason: SDUPTHER

## 2021-12-22 NOTE — PROGRESS NOTES
Identifying Data:  64 y.o. , Janell Sanchez , male     HISTORICAL DATA (REVIEWED TODAY):  ALLERGIES-    Allergies   Allergen Reactions    Teicoplanin Rash       MEDICATION AS OF LAST RECONCILIATION (NOT INCLUDING CHANGES MADE TODAY):    Current Outpatient Medications on File Prior to Visit   Medication Sig Dispense Refill    amitriptyline (ELAVIL) 25 mg tablet TAKE 1 TABLET BY MOUTH EVERY DAY FOR PERSISTENT INSOMNIA 90 Tablet 2    glucose blood VI test strips (Accu-Chek Guide test strips) strip USE TO CHECK FINGER STICK BLOOD GLUCOSE 3 TIMES A DAY DX CODE E11.9 300 Strip 3    [DISCONTINUED] doxepin (SINEquan) 50 mg capsule Take 1 Capsule by mouth nightly. 30 Capsule 1    hydroCHLOROthiazide (HYDRODIURIL) 25 mg tablet TAKE 1 TABLET BY MOUTH EVERY DAY FOR BLOOD PRESSURE 90 Tablet 3    metFORMIN (GLUCOPHAGE) 1,000 mg tablet TAKE 1 TABLET BY MOUTH TWICE A  Tablet 1    gabapentin (NEURONTIN) 600 mg tablet Take 1 Tablet by mouth three (3) times daily. Max Daily Amount: 1,800 mg. For severe peripheral neuropathy. 90 Tablet 2    glimepiride (AMARYL) 2 mg tablet TAKE 1 TABLET BY MOUTH EVERY DAY IN THE MORNING 90 Tablet 1    Jardiance 10 mg tablet TAKE 1 TABLET BY MOUTH EVERY DAY 90 Tablet 3    diclofenac sodium (VOLTAREN PO) Take  by mouth.       cyclobenzaprine (FLEXERIL) 10 mg tablet TAKE 1/2 TABLET BY MOUTH 2 TIMES A DAY FOR STIFFNESS AND BEDTIME DOSE AT 10MG AS DIRECTED      [DISCONTINUED] meloxicam (MOBIC) 15 mg tablet TAKE 1 TABLET BY MOUTH EVERY DAY (Patient not taking: Reported on 9/14/2021)      ergocalciferol (ERGOCALCIFEROL) 1,250 mcg (50,000 unit) capsule TAKE 1 CAPSULE BY MOUTH ONE TIME PER WEEK 12 Cap 3    busPIRone (BUSPAR) 5 mg tablet TAKE 1 TABLET BY MOUTH TWICE A  Tab 2    allopurinoL (ZYLOPRIM) 300 mg tablet TAKE 1 TABLET BY MOUTH EVERY DAY 90 Tab 5    Blood-Glucose Meter (Accu-Chek Guide Me Glucose Mtr) misc Use to check finger stick blood glucose TID  DX CODE E11.9 1 Each 0  Lancing Device with Lancets (Accu-Chek Multiclix Lancet) kit Use to check finger stick blood glucose TID  DX CODE E11.9 1 Kit 0    lancets (Accu-Chek Multiclix Lancet) misc Use to check finger stick blood glucose TID  DX CODE E11.9 300 Each 3    zolpidem (AMBIEN) 5 mg tablet Take 1 Tab by mouth nightly. Max Daily Amount: 5 mg. 30 Tab 3    glucose blood VI test strips (FreeStyle Lite Strips) strip Use  To check sugars 3 times daily  DX CODE E11.65 300 Strip 5    [DISCONTINUED] lancets misc Use  To check sugars 3 times daily  DX CODE E11.65 (Patient not taking: Reported on 9/14/2021) 300 Each 5    glucose blood VI test strips (FREESTYLE LITE STRIPS) strip Bid monitoring dx: 250.00 (Patient not taking: Reported on 9/14/2021) 1 Package 11    [DISCONTINUED] valsartan (DIOVAN) 160 mg tablet Take  by mouth daily.  [DISCONTINUED] Lancets misc Bid monitoring dx: 250.00 (Patient not taking: Reported on 9/14/2021) 1 Package 11     No current facility-administered medications on file prior to visit. PAST MEDICAL HISTORY:    Patient Active Problem List   Diagnosis Code    ED (erectile dysfunction) N52.9    Insomnia G47.00    Gout M10.9    Diabetes mellitus ([address] E11.9    Hypertension I10    Diabetes ([address] E11.9    Loss of perception for smell R43.0    Onychomycosis B35.1       ASSESSMENT AND PLAN:      ICD-10-CM ICD-9-CM    1.  Primary insomnia  F51.01 307.42 doxepin (SINEquan) 50 mg capsule             Madelin Barnes DO

## 2022-01-13 ENCOUNTER — OFFICE VISIT (OUTPATIENT)
Dept: NEUROLOGY | Age: 57
End: 2022-01-13
Payer: COMMERCIAL

## 2022-01-13 VITALS
DIASTOLIC BLOOD PRESSURE: 88 MMHG | SYSTOLIC BLOOD PRESSURE: 144 MMHG | RESPIRATION RATE: 18 BRPM | OXYGEN SATURATION: 97 % | WEIGHT: 239.9 LBS | HEART RATE: 82 BPM | TEMPERATURE: 97.6 F | BODY MASS INDEX: 31.65 KG/M2

## 2022-01-13 DIAGNOSIS — E11.42 DIABETIC SENSORIMOTOR NEUROPATHY (HCC): Primary | ICD-10-CM

## 2022-01-13 PROCEDURE — 99214 OFFICE O/P EST MOD 30 MIN: CPT | Performed by: PSYCHIATRY & NEUROLOGY

## 2022-01-13 NOTE — PROGRESS NOTES
Pollo Scott (1965) is a 64 y.o. male, established patient, here for evaluation of the following     Chief complaint(s):   Chief Complaint   Patient presents with    Follow-up     Follow up diabetic neuropathy; states neuropathy is no better and is affecting sleep patterns, notes 2 recent falls, one on December 22 and on January 3        SUBJECTIVE/ OBJECTIVE:    HPI: 64 y.o. male      Pt continues to have severe pain from his neuropathy  Has gone up on Gabapentin to 600 mg TID  No improvement in pain, no SEFx reported  Reports having 2 falls since last visit      Review of Systems: as above     ========================================    Brief Hx:     Initial Visit: 9-14-21 (see for full details)    EMG (11-2-2021, bilateral lower extremities): 1) Severe sensorimotor polyneuropathy with absent tibial F waves and tibial H reflexes (due to absent right tibial motor response and near absent left tibial motor response). 2) Chronic active denervation in distal lower extremity muscles consistent with severity of underlying peripheral neuropathy. Likely due to history of longstanding diabetes mellitus. If any clinical concern of a demyelinating neuropathy, recommend checking bilateral upper extremity EMG nerve conduction studies including F waves). 3) There is active denervation in the left gluteus yung (proximal S1 muscle), compatible with a chronic, active, left S1 lumbar radiculopathy. 4) No electrodiagnostic findings to suggest right lumbar radiculopathy.         Allergies   Allergen Reactions    Teicoplanin Rash         Current Outpatient Medications:     doxepin (SINEquan) 50 mg capsule, Take 1 Capsule by mouth nightly., Disp: 90 Capsule, Rfl: 1    amitriptyline (ELAVIL) 25 mg tablet, TAKE 1 TABLET BY MOUTH EVERY DAY FOR PERSISTENT INSOMNIA, Disp: 90 Tablet, Rfl: 2    glucose blood VI test strips (Accu-Chek Guide test strips) strip, USE TO CHECK FINGER STICK BLOOD GLUCOSE 3 TIMES A DAY DX CODE E11.9, Disp: 300 Strip, Rfl: 3    hydroCHLOROthiazide (HYDRODIURIL) 25 mg tablet, TAKE 1 TABLET BY MOUTH EVERY DAY FOR BLOOD PRESSURE, Disp: 90 Tablet, Rfl: 3    metFORMIN (GLUCOPHAGE) 1,000 mg tablet, TAKE 1 TABLET BY MOUTH TWICE A DAY, Disp: 180 Tablet, Rfl: 1    gabapentin (NEURONTIN) 600 mg tablet, Take 1 Tablet by mouth three (3) times daily. Max Daily Amount: 1,800 mg. For severe peripheral neuropathy. , Disp: 90 Tablet, Rfl: 2    glimepiride (AMARYL) 2 mg tablet, TAKE 1 TABLET BY MOUTH EVERY DAY IN THE MORNING, Disp: 90 Tablet, Rfl: 1    Jardiance 10 mg tablet, TAKE 1 TABLET BY MOUTH EVERY DAY, Disp: 90 Tablet, Rfl: 3    cyclobenzaprine (FLEXERIL) 10 mg tablet, TAKE 1/2 TABLET BY MOUTH 2 TIMES A DAY FOR STIFFNESS AND BEDTIME DOSE AT 10MG AS DIRECTED, Disp: , Rfl:     ergocalciferol (ERGOCALCIFEROL) 1,250 mcg (50,000 unit) capsule, TAKE 1 CAPSULE BY MOUTH ONE TIME PER WEEK, Disp: 12 Cap, Rfl: 3    busPIRone (BUSPAR) 5 mg tablet, TAKE 1 TABLET BY MOUTH TWICE A DAY, Disp: 180 Tab, Rfl: 2    allopurinoL (ZYLOPRIM) 300 mg tablet, TAKE 1 TABLET BY MOUTH EVERY DAY, Disp: 90 Tab, Rfl: 5    Blood-Glucose Meter (Accu-Chek Guide Me Glucose Mtr) misc, Use to check finger stick blood glucose TID  DX CODE E11.9, Disp: 1 Each, Rfl: 0    Lancing Device with Lancets (Accu-Chek Multiclix Lancet) kit, Use to check finger stick blood glucose TID  DX CODE E11.9, Disp: 1 Kit, Rfl: 0    lancets (Accu-Chek Multiclix Lancet) misc, Use to check finger stick blood glucose TID  DX CODE E11.9, Disp: 300 Each, Rfl: 3    zolpidem (AMBIEN) 5 mg tablet, Take 1 Tab by mouth nightly. Max Daily Amount: 5 mg., Disp: 30 Tab, Rfl: 3    glucose blood VI test strips (FreeStyle Lite Strips) strip, Use  To check sugars 3 times daily  DX CODE E11.65, Disp: 300 Strip, Rfl: 5    diclofenac sodium (VOLTAREN PO), Take  by mouth.  (Patient not taking: Reported on 1/13/2022), Disp: , Rfl:     glucose blood VI test strips (FREESTYLE LITE STRIPS) strip, Bid monitoring dx: 250.00 (Patient not taking: Reported on 1/13/2022), Disp: 1 Package, Rfl: 11     has a past medical history of Diabetes ([address] Gout, and Hypertension. has a past surgical history that includes hx heent; pr abdomen surgery proc unlisted; hx carpal tunnel release (Left, 09/21/2020); hx orthopaedic; and hx orthopaedic (Left, 09/2020). Physical Exam:    Vitals:    01/13/22 1116   BP: (!) 144/88   BP 1 Location: Left arm   BP Patient Position: Sitting   BP Cuff Size: Large adult   Pulse: 82   Temp: 97.6 °F (36.4 °C)   TempSrc: Temporal   Resp: 18   Weight: 108.8 kg (239 lb 14.4 oz)   SpO2: 97%     No exam performed today  Spent visit discussing neuropathy symptoms, how to increase med, and other treatment options    ========================================    ASSESSMENT/ PLAN:       ICD-10-CM ICD-9-CM    1. Diabetic sensorimotor neuropathy (HCC)  E11.42 250.60      357.2         Advised pt to increase existing Gabapentin 600 mg tabs to 2 tabs three times a day and message back in 1 week to let me know if pain is noticeably reduced or not. If not, will stop Aleisha and start him on Lyrica. Also d/w him that we can try neuropathic compound cream (he says he's familiar with this as his wife uses one). He will consider the compound cream and let me know if he wants me to prescribe that/ which pharmacy. F/u in 2 months      An electronic signature was used to authenticate this note.   -- Valerio Cruz MD

## 2022-01-13 NOTE — PROGRESS NOTES
Chief Complaint   Patient presents with    Follow-up     Follow up diabetic neuropathy; states neuropathy is no better and is affecting sleep patterns, notes 2 recent falls, one on December 22 and on January 3      Visit Vitals  BP (!) 144/88 (BP 1 Location: Left arm, BP Patient Position: Sitting, BP Cuff Size: Large adult)   Pulse 82   Temp 97.6 °F (36.4 °C) (Temporal)   Resp 18   Wt 108.8 kg (239 lb 14.4 oz)   SpO2 97%   BMI 31.65 kg/m²

## 2022-01-13 NOTE — PATIENT INSTRUCTIONS
Increase Gabapentin 600 mg to TWO tablets three times a day. Message back in 1 week and let me know if much better or not.   If not, I'll send in a prescription, for Lyrica

## 2022-01-13 NOTE — LETTER
1/13/2022    Patient: [facility]   YOB: 1965   Date of Visit: 1/13/2022     Estrella Oreilly DO  [address]  [address] [facility] [address]    Dear Estrella Oreilly DO,      Thank you for referring Mr. Tarik Herring to [facility] for evaluation. My notes for this consultation are attached. If you have questions, please do not hesitate to call me. I look forward to following your patient along with you.       Sincerely,    Marcin Becerra MD

## 2022-01-21 ENCOUNTER — PATIENT MESSAGE (OUTPATIENT)
Dept: NEUROLOGY | Age: 57
End: 2022-01-21

## 2022-01-21 DIAGNOSIS — M54.16 CHRONIC LEFT-SIDED LUMBAR RADICULOPATHY: ICD-10-CM

## 2022-01-21 DIAGNOSIS — E11.42 DIABETIC SENSORIMOTOR NEUROPATHY (HCC): ICD-10-CM

## 2022-01-22 RX ORDER — GABAPENTIN 600 MG/1
1200 TABLET ORAL 3 TIMES DAILY
Qty: 180 TABLET | Refills: 2 | Status: SHIPPED | OUTPATIENT
Start: 2022-01-22 | End: 2022-03-17 | Stop reason: SDUPTHER

## 2022-01-22 NOTE — TELEPHONE ENCOUNTER
From: Sapphire Reyna  To: Star Johnston MD  Sent: 1/21/2022 8:38 AM EST  Subject: increase of gabapentin    DURING MY LAST VISIT WE DISCUSSED INCREASING MY DOSEAGE OF GABAPENTIN,SINCE LAST WEEK THE INCREASE HAS MADE THE PAIN NOT AS BAD,IT IS MORE LIVEABLE BUT NOT AS BAD,SLEEP IS STILL A PROBLEM. I REQUESTED A REFILL OF GABAPENTIN THIS MORNING. THANK YOU.

## 2022-02-17 LAB
ALBUMIN SERPL-MCNC: 4.2 G/DL (ref 3.8–4.9)
ALBUMIN/GLOB SERPL: 1.4 {RATIO} (ref 1.2–2.2)
ALP SERPL-CCNC: 131 IU/L (ref 44–121)
ALT SERPL-CCNC: 23 IU/L (ref 0–44)
AST SERPL-CCNC: 12 IU/L (ref 0–40)
BASOPHILS # BLD AUTO: 0.1 X10E3/UL (ref 0–0.2)
BASOPHILS NFR BLD AUTO: 1 %
BILIRUB SERPL-MCNC: 0.5 MG/DL (ref 0–1.2)
BUN SERPL-MCNC: 23 MG/DL (ref 6–24)
BUN/CREAT SERPL: 19 (ref 9–20)
CALCIUM SERPL-MCNC: 9.4 MG/DL (ref 8.7–10.2)
CHLORIDE SERPL-SCNC: 100 MMOL/L (ref 96–106)
CHOLEST SERPL-MCNC: 190 MG/DL (ref 100–199)
CO2 SERPL-SCNC: 25 MMOL/L (ref 20–29)
CREAT SERPL-MCNC: 1.2 MG/DL (ref 0.76–1.27)
EOSINOPHIL # BLD AUTO: 0.4 X10E3/UL (ref 0–0.4)
EOSINOPHIL NFR BLD AUTO: 6 %
ERYTHROCYTE [DISTWIDTH] IN BLOOD BY AUTOMATED COUNT: 13.5 % (ref 11.6–15.4)
EST. AVERAGE GLUCOSE BLD GHB EST-MCNC: 212 MG/DL
GLOBULIN SER CALC-MCNC: 2.9 G/DL (ref 1.5–4.5)
GLUCOSE SERPL-MCNC: 229 MG/DL (ref 65–99)
HBA1C MFR BLD: 9 % (ref 4.8–5.6)
HCT VFR BLD AUTO: 45.8 % (ref 37.5–51)
HDLC SERPL-MCNC: 38 MG/DL
HGB BLD-MCNC: 15.6 G/DL (ref 13–17.7)
IMM GRANULOCYTES # BLD AUTO: 0.1 X10E3/UL (ref 0–0.1)
IMM GRANULOCYTES NFR BLD AUTO: 1 %
LDLC SERPL CALC-MCNC: 112 MG/DL (ref 0–99)
LYMPHOCYTES # BLD AUTO: 1.6 X10E3/UL (ref 0.7–3.1)
LYMPHOCYTES NFR BLD AUTO: 25 %
MCH RBC QN AUTO: 31.1 PG (ref 26.6–33)
MCHC RBC AUTO-ENTMCNC: 34.1 G/DL (ref 31.5–35.7)
MCV RBC AUTO: 91 FL (ref 79–97)
MONOCYTES # BLD AUTO: 0.5 X10E3/UL (ref 0.1–0.9)
MONOCYTES NFR BLD AUTO: 7 %
NEUTROPHILS # BLD AUTO: 3.9 X10E3/UL (ref 1.4–7)
NEUTROPHILS NFR BLD AUTO: 60 %
PLATELET # BLD AUTO: 187 X10E3/UL (ref 150–450)
POTASSIUM SERPL-SCNC: 4.6 MMOL/L (ref 3.5–5.2)
PROT SERPL-MCNC: 7.1 G/DL (ref 6–8.5)
RBC # BLD AUTO: 5.01 X10E6/UL (ref 4.14–5.8)
SODIUM SERPL-SCNC: 139 MMOL/L (ref 134–144)
TRIGL SERPL-MCNC: 228 MG/DL (ref 0–149)
TSH SERPL DL<=0.005 MIU/L-ACNC: 4.49 UIU/ML (ref 0.45–4.5)
VLDLC SERPL CALC-MCNC: 40 MG/DL (ref 5–40)
WBC # BLD AUTO: 6.6 X10E3/UL (ref 3.4–10.8)

## 2022-03-01 DIAGNOSIS — E11.9 TYPE 2 DIABETES MELLITUS WITHOUT COMPLICATIONS (HCC): ICD-10-CM

## 2022-03-01 DIAGNOSIS — M10.9 GOUT, UNSPECIFIED CAUSE, UNSPECIFIED CHRONICITY, UNSPECIFIED SITE: Primary | ICD-10-CM

## 2022-03-03 DIAGNOSIS — E55.9 VITAMIN D DEFICIENCY: ICD-10-CM

## 2022-03-03 DIAGNOSIS — E11.65 UNCONTROLLED TYPE 2 DIABETES MELLITUS WITH HYPERGLYCEMIA (HCC): Primary | ICD-10-CM

## 2022-03-03 PROBLEM — E08.42 DIABETIC POLYNEUROPATHY ASSOCIATED WITH DIABETES MELLITUS DUE TO UNDERLYING CONDITION (HCC): Status: ACTIVE | Noted: 2022-03-03

## 2022-03-03 PROBLEM — R60.0 LOCALIZED EDEMA: Status: ACTIVE | Noted: 2022-03-03

## 2022-03-03 PROBLEM — G56.02 CARPAL TUNNEL SYNDROME OF LEFT WRIST: Status: ACTIVE | Noted: 2022-03-03

## 2022-03-03 PROBLEM — M62.81 MUSCLE WEAKNESS (GENERALIZED): Status: ACTIVE | Noted: 2022-03-03

## 2022-03-03 PROBLEM — G56.20 LESION OF ULNAR NERVE: Status: ACTIVE | Noted: 2022-03-03

## 2022-03-03 PROBLEM — M79.642 PAIN IN LEFT HAND: Status: ACTIVE | Noted: 2022-03-03

## 2022-03-03 PROBLEM — R68.89 OTHER GENERAL SYMPTOMS AND SIGNS: Status: ACTIVE | Noted: 2022-03-03

## 2022-03-03 PROBLEM — R20.2 PARESTHESIA OF SKIN: Status: ACTIVE | Noted: 2022-03-03

## 2022-03-03 PROBLEM — M25.69 STIFFNESS OF OTHER SPECIFIED JOINT, NOT ELSEWHERE CLASSIFIED: Status: ACTIVE | Noted: 2022-03-03

## 2022-03-03 PROBLEM — G56.01 CARPAL TUNNEL SYNDROME OF RIGHT WRIST: Status: ACTIVE | Noted: 2022-03-03

## 2022-03-03 PROBLEM — R20.8 OTHER DISTURBANCES OF SKIN SENSATION: Status: ACTIVE | Noted: 2022-03-03

## 2022-03-03 PROBLEM — R20.0 ANESTHESIA OF SKIN: Status: ACTIVE | Noted: 2022-03-03

## 2022-03-03 RX ORDER — METFORMIN HYDROCHLORIDE 1000 MG/1
1000 TABLET ORAL 2 TIMES DAILY
Qty: 180 TABLET | Refills: 0 | Status: SHIPPED | OUTPATIENT
Start: 2022-03-03 | End: 2022-04-21 | Stop reason: ALTCHOICE

## 2022-03-03 RX ORDER — ALLOPURINOL 300 MG/1
300 TABLET ORAL DAILY
Qty: 90 TABLET | Refills: 0 | Status: SHIPPED | OUTPATIENT
Start: 2022-03-03 | End: 2022-06-23

## 2022-03-03 NOTE — PROGRESS NOTES
Pt has not had a urine for microalbuminuria done in some time. Also has vit D deficiency that needs status update. He is not presently on a statin- unclear why. Labs were ordered in Nov 2021 and pt did in Feb 2022. DM still not controlled w/ A1c 9.0 vs 8.5 on 8/18/2021 vs 9.6 on 4/8.2021 vs 9.9 on 6/11/2020. No record of referral to endo for evaluation. His PCP has left office- earliest appt w/ me 4/21/2022. Will contact him via [employer] to get additional labs done and discuss referral to endo prior to his appt w/ me. It is imperative to get his diabetes under control. He has severe neuropathy in both legs.

## 2022-03-17 ENCOUNTER — OFFICE VISIT (OUTPATIENT)
Dept: NEUROLOGY | Age: 57
End: 2022-03-17
Payer: COMMERCIAL

## 2022-03-17 VITALS
WEIGHT: 239 LBS | BODY MASS INDEX: 31.68 KG/M2 | HEART RATE: 86 BPM | OXYGEN SATURATION: 99 % | DIASTOLIC BLOOD PRESSURE: 70 MMHG | SYSTOLIC BLOOD PRESSURE: 118 MMHG | RESPIRATION RATE: 16 BRPM | HEIGHT: 73 IN

## 2022-03-17 DIAGNOSIS — E11.42 DIABETIC SENSORIMOTOR NEUROPATHY (HCC): ICD-10-CM

## 2022-03-17 DIAGNOSIS — E11.40 CHRONIC PAINFUL DIABETIC NEUROPATHY (HCC): Primary | ICD-10-CM

## 2022-03-17 PROCEDURE — 99214 OFFICE O/P EST MOD 30 MIN: CPT | Performed by: PSYCHIATRY & NEUROLOGY

## 2022-03-17 PROCEDURE — 3046F HEMOGLOBIN A1C LEVEL >9.0%: CPT | Performed by: PSYCHIATRY & NEUROLOGY

## 2022-03-17 RX ORDER — GABAPENTIN 600 MG/1
1200 TABLET ORAL 3 TIMES DAILY
Qty: 180 TABLET | Refills: 2 | Status: SHIPPED | OUTPATIENT
Start: 2022-03-17 | End: 2022-06-14 | Stop reason: SDUPTHER

## 2022-03-17 RX ORDER — PREGABALIN 75 MG/1
75 CAPSULE ORAL
Qty: 30 CAPSULE | Refills: 2 | Status: SHIPPED | OUTPATIENT
Start: 2022-03-17 | End: 2022-06-14

## 2022-03-17 NOTE — PROGRESS NOTES
Chief Complaint   Patient presents with    Follow-up     2 months/diabetic neuropathy, doing well on the increase of gabapentin     Visit Vitals  /70 (BP 1 Location: Left upper arm, BP Patient Position: Sitting)   Pulse 86   Resp 16   Ht 6' 1\" (1.854 m)   Wt 108.4 kg (239 lb)   SpO2 99%   BMI 31.53 kg/m²

## 2022-03-17 NOTE — PROGRESS NOTES
Inessa Wilkinson (1965) is a 64 y.o. male, established patient, here for evaluation of the following     Chief complaint(s):   Chief Complaint   Patient presents with    Follow-up     2 months/diabetic neuropathy, doing well on the increase of gabapentin       SUBJECTIVE/ OBJECTIVE:    HPI: 64 y.o. male      Patient reports that after increasing gabapentin 600 mg to 2 tablets three times a day  Says this has reduced his neuropathy pain  Before increasing Gabapentin, pain was 9-10/ 10  Since increasing Gabapentin, pain has been reduced to 3-5/ 10 most of the time  Is finding that he wakes up in middle of night due to pain and hard to get back to sleep  PCP has added Doxepin and he alternates Amitriptyline (50 mg up to 3 tabs QHS) to help sleep  Still waking throughout night  Has tried/ failed Trazodone    Reviewed : Only receiving gabapentin from my office. Last filled gabapentin on 2/7/2020. No concerning findings on     Reviewed recent CMP: normal renal function    ========================================    Brief Hx:     Initial Visit: 9-14-21 (see for full details)    EMG (11-2-2021, bilateral lower extremities): 1) Severe sensorimotor polyneuropathy with absent tibial F waves and tibial H reflexes (due to absent right tibial motor response and near absent left tibial motor response). 2) Chronic active denervation in distal lower extremity muscles consistent with severity of underlying peripheral neuropathy. Likely due to history of longstanding diabetes mellitus. If any clinical concern of a demyelinating neuropathy, recommend checking bilateral upper extremity EMG nerve conduction studies including F waves). 3) There is active denervation in the left gluteus yung (proximal S1 muscle), compatible with a chronic, active, left S1 lumbar radiculopathy. 4) No electrodiagnostic findings to suggest right lumbar radiculopathy.         Allergies   Allergen Reactions    Teicoplanin Rash Current Outpatient Medications:     pregabalin (LYRICA) 75 mg capsule, Take 1 Capsule by mouth nightly. Max Daily Amount: 75 mg., Disp: 30 Capsule, Rfl: 2    gabapentin (NEURONTIN) 600 mg tablet, Take 2 Tablets by mouth three (3) times daily. Max Daily Amount: 3,600 mg. For severe peripheral neuropathy. , Disp: 180 Tablet, Rfl: 2    allopurinoL (ZYLOPRIM) 300 mg tablet, Take 1 Tablet by mouth daily. , Disp: 90 Tablet, Rfl: 0    metFORMIN (GLUCOPHAGE) 1,000 mg tablet, Take 1 Tablet by mouth two (2) times a day., Disp: 180 Tablet, Rfl: 0    doxepin (SINEquan) 50 mg capsule, Take 1 Capsule by mouth nightly., Disp: 90 Capsule, Rfl: 1    amitriptyline (ELAVIL) 25 mg tablet, TAKE 1 TABLET BY MOUTH EVERY DAY FOR PERSISTENT INSOMNIA, Disp: 90 Tablet, Rfl: 2    glucose blood VI test strips (Accu-Chek Guide test strips) strip, USE TO CHECK FINGER STICK BLOOD GLUCOSE 3 TIMES A DAY DX CODE E11.9, Disp: 300 Strip, Rfl: 3    hydroCHLOROthiazide (HYDRODIURIL) 25 mg tablet, TAKE 1 TABLET BY MOUTH EVERY DAY FOR BLOOD PRESSURE, Disp: 90 Tablet, Rfl: 3    glimepiride (AMARYL) 2 mg tablet, TAKE 1 TABLET BY MOUTH EVERY DAY IN THE MORNING, Disp: 90 Tablet, Rfl: 1    Jardiance 10 mg tablet, TAKE 1 TABLET BY MOUTH EVERY DAY, Disp: 90 Tablet, Rfl: 3    cyclobenzaprine (FLEXERIL) 10 mg tablet, TAKE 1/2 TABLET BY MOUTH 2 TIMES A DAY FOR STIFFNESS AND BEDTIME DOSE AT 10MG AS DIRECTED, Disp: , Rfl:     ergocalciferol (ERGOCALCIFEROL) 1,250 mcg (50,000 unit) capsule, TAKE 1 CAPSULE BY MOUTH ONE TIME PER WEEK, Disp: 12 Cap, Rfl: 3    busPIRone (BUSPAR) 5 mg tablet, TAKE 1 TABLET BY MOUTH TWICE A DAY, Disp: 180 Tab, Rfl: 2    Blood-Glucose Meter (Accu-Chek Guide Me Glucose Mtr) misc, Use to check finger stick blood glucose TID  DX CODE E11.9, Disp: 1 Each, Rfl: 0    Lancing Device with Lancets (Accu-Chek Multiclix Lancet) kit, Use to check finger stick blood glucose TID  DX CODE E11.9, Disp: 1 Kit, Rfl: 0    lancets (Accu-Chek Multiclix Lancet) misc, Use to check finger stick blood glucose TID  DX CODE E11.9, Disp: 300 Each, Rfl: 3    glucose blood VI test strips (FreeStyle Lite Strips) strip, Use  To check sugars 3 times daily  DX CODE E11.65, Disp: 300 Strip, Rfl: 5    glucose blood VI test strips (FREESTYLE LITE STRIPS) strip, Bid monitoring dx: 250.00, Disp: 1 Package, Rfl: 11    diclofenac sodium (VOLTAREN PO), Take  by mouth. (Patient not taking: Reported on 1/13/2022), Disp: , Rfl:      has a past medical history of Diabetes ([address] Gout, and Hypertension. has a past surgical history that includes hx heent; pr abdomen surgery proc unlisted; hx carpal tunnel release (Left, 09/21/2020); hx orthopaedic; and hx orthopaedic (Left, 09/2020). Physical Exam:    Vitals:    03/17/22 1044   BP: 118/70   BP 1 Location: Left upper arm   BP Patient Position: Sitting   Pulse: 86   Resp: 16   Height: 6' 1\" (1.854 m)   Weight: 108.4 kg (239 lb)   SpO2: 99%     No exam performed today    Spent visit discussing neuropathy symptoms, med changes    ========================================    ASSESSMENT/ PLAN:       ICD-10-CM ICD-9-CM    1. Chronic painful diabetic neuropathy (HCC)  E11.40 250.60 pregabalin (LYRICA) 75 mg capsule     357.2    2. Diabetic sensorimotor neuropathy (HCC)  E11.42 250.60 pregabalin (LYRICA) 75 mg capsule     357.2 gabapentin (NEURONTIN) 600 mg tablet        Continue Gabapentin 600 mg 2 tablets three times a day  Adding Lyrica 75 mg QHS to help with nighttime neuropathic pain  Pt to message me 2 weeks after starting Lyrica to let me know if it's helping or not    F/u in 3 months          An electronic signature was used to authenticate this note.   -- Ivette Lua MD

## 2022-03-18 PROBLEM — R20.0 ANESTHESIA OF SKIN: Status: ACTIVE | Noted: 2022-03-03

## 2022-03-18 PROBLEM — G56.20 LESION OF ULNAR NERVE: Status: ACTIVE | Noted: 2022-03-03

## 2022-03-18 PROBLEM — R20.2 PARESTHESIA OF SKIN: Status: ACTIVE | Noted: 2022-03-03

## 2022-03-18 PROBLEM — G56.02 CARPAL TUNNEL SYNDROME OF LEFT WRIST: Status: ACTIVE | Noted: 2022-03-03

## 2022-03-18 PROBLEM — E11.65 UNCONTROLLED TYPE 2 DIABETES MELLITUS WITH HYPERGLYCEMIA (HCC): Status: ACTIVE | Noted: 2022-03-03

## 2022-03-18 PROBLEM — G56.01 CARPAL TUNNEL SYNDROME OF RIGHT WRIST: Status: ACTIVE | Noted: 2022-03-03

## 2022-03-19 PROBLEM — R68.89 OTHER GENERAL SYMPTOMS AND SIGNS: Status: ACTIVE | Noted: 2022-03-03

## 2022-03-19 PROBLEM — E08.42 DIABETIC POLYNEUROPATHY ASSOCIATED WITH DIABETES MELLITUS DUE TO UNDERLYING CONDITION (HCC): Status: ACTIVE | Noted: 2022-03-03

## 2022-03-19 PROBLEM — M25.69 STIFFNESS OF OTHER SPECIFIED JOINT, NOT ELSEWHERE CLASSIFIED: Status: ACTIVE | Noted: 2022-03-03

## 2022-03-19 PROBLEM — M62.81 MUSCLE WEAKNESS (GENERALIZED): Status: ACTIVE | Noted: 2022-03-03

## 2022-03-19 PROBLEM — M79.642 PAIN IN LEFT HAND: Status: ACTIVE | Noted: 2022-03-03

## 2022-03-19 PROBLEM — R60.0 LOCALIZED EDEMA: Status: ACTIVE | Noted: 2022-03-03

## 2022-03-19 PROBLEM — B35.1 ONYCHOMYCOSIS: Status: ACTIVE | Noted: 2020-11-04

## 2022-03-19 PROBLEM — E55.9 VITAMIN D DEFICIENCY: Status: ACTIVE | Noted: 2022-03-03

## 2022-03-19 PROBLEM — R43.0 LOSS OF PERCEPTION FOR SMELL: Status: ACTIVE | Noted: 2020-11-04

## 2022-03-20 PROBLEM — R20.8 OTHER DISTURBANCES OF SKIN SENSATION: Status: ACTIVE | Noted: 2022-03-03

## 2022-03-21 ENCOUNTER — TRANSCRIBE ORDER (OUTPATIENT)
Dept: SCHEDULING | Age: 57
End: 2022-03-21

## 2022-03-21 DIAGNOSIS — R09.89 DIMINISHED PULSE: Primary | ICD-10-CM

## 2022-03-28 DIAGNOSIS — E55.9 VITAMIN D DEFICIENCY, UNSPECIFIED: ICD-10-CM

## 2022-03-28 RX ORDER — DULOXETIN HYDROCHLORIDE 30 MG/1
30 CAPSULE, DELAYED RELEASE ORAL DAILY
COMMUNITY
End: 2022-03-28 | Stop reason: SDUPTHER

## 2022-03-31 RX ORDER — ERGOCALCIFEROL 1.25 MG/1
CAPSULE ORAL
Qty: 12 CAPSULE | Refills: 3 | OUTPATIENT
Start: 2022-03-31

## 2022-03-31 RX ORDER — DULOXETIN HYDROCHLORIDE 30 MG/1
30 CAPSULE, DELAYED RELEASE ORAL DAILY
Qty: 90 CAPSULE | Refills: 1 | Status: SHIPPED | OUTPATIENT
Start: 2022-03-31 | End: 2022-06-03 | Stop reason: DRUGHIGH

## 2022-04-05 DIAGNOSIS — E11.65 UNCONTROLLED TYPE 2 DIABETES MELLITUS WITH HYPERGLYCEMIA (HCC): Primary | ICD-10-CM

## 2022-04-16 DIAGNOSIS — E55.9 VITAMIN D DEFICIENCY: Primary | ICD-10-CM

## 2022-04-16 LAB
25(OH)D3+25(OH)D2 SERPL-MCNC: 18.8 NG/ML (ref 30–100)
ALBUMIN/CREAT UR: 212 MG/G CREAT (ref 0–29)
C PEPTIDE SERPL-MCNC: 6.3 NG/ML (ref 1.1–4.4)
CREAT UR-MCNC: 55 MG/DL
INSULIN SERPL-ACNC: 21.8 UIU/ML (ref 2.6–24.9)
MICROALBUMIN UR-MCNC: 116.8 UG/ML

## 2022-04-16 RX ORDER — ERGOCALCIFEROL 1.25 MG/1
50000 CAPSULE ORAL
Qty: 12 CAPSULE | Refills: 0 | Status: SHIPPED | OUTPATIENT
Start: 2022-04-16 | End: 2022-09-02 | Stop reason: ALTCHOICE

## 2022-04-17 NOTE — PROGRESS NOTES
Vitamin D level low - have ordered 12 week course of high dose Vit D. Pt has not read past [employer] messages will have staff contact him about Vit D supplementation.  He has been on high dose Vit D in past.

## 2022-04-21 ENCOUNTER — OFFICE VISIT (OUTPATIENT)
Dept: FAMILY MEDICINE CLINIC | Age: 57
End: 2022-04-21
Payer: COMMERCIAL

## 2022-04-21 VITALS
DIASTOLIC BLOOD PRESSURE: 102 MMHG | SYSTOLIC BLOOD PRESSURE: 174 MMHG | WEIGHT: 237 LBS | OXYGEN SATURATION: 98 % | HEART RATE: 109 BPM | HEIGHT: 73 IN | RESPIRATION RATE: 18 BRPM | BODY MASS INDEX: 31.41 KG/M2 | TEMPERATURE: 98.3 F

## 2022-04-21 DIAGNOSIS — Z12.11 SCREENING FOR MALIGNANT NEOPLASM OF COLON: ICD-10-CM

## 2022-04-21 DIAGNOSIS — M54.9 CHRONIC BACK PAIN, UNSPECIFIED BACK LOCATION, UNSPECIFIED BACK PAIN LATERALITY: ICD-10-CM

## 2022-04-21 DIAGNOSIS — E11.65 UNCONTROLLED TYPE 2 DIABETES MELLITUS WITH HYPERGLYCEMIA (HCC): Primary | ICD-10-CM

## 2022-04-21 DIAGNOSIS — G89.29 CHRONIC BACK PAIN, UNSPECIFIED BACK LOCATION, UNSPECIFIED BACK PAIN LATERALITY: ICD-10-CM

## 2022-04-21 DIAGNOSIS — I10 PRIMARY HYPERTENSION: ICD-10-CM

## 2022-04-21 PROCEDURE — 3046F HEMOGLOBIN A1C LEVEL >9.0%: CPT | Performed by: NURSE PRACTITIONER

## 2022-04-21 PROCEDURE — 99214 OFFICE O/P EST MOD 30 MIN: CPT | Performed by: NURSE PRACTITIONER

## 2022-04-21 RX ORDER — METFORMIN HYDROCHLORIDE 500 MG/1
TABLET, EXTENDED RELEASE ORAL
Qty: 360 TABLET | Refills: 1 | Status: SHIPPED | OUTPATIENT
Start: 2022-04-21 | End: 2022-10-20

## 2022-04-21 RX ORDER — CHLORTHALIDONE 25 MG/1
25 TABLET ORAL DAILY
Qty: 90 TABLET | Refills: 1 | Status: SHIPPED | OUTPATIENT
Start: 2022-04-21 | End: 2022-09-30 | Stop reason: SDUPTHER

## 2022-04-21 RX ORDER — MELOXICAM 15 MG/1
15 TABLET ORAL DAILY
COMMUNITY
End: 2022-06-14

## 2022-04-21 RX ORDER — SILDENAFIL 100 MG/1
TABLET, FILM COATED ORAL
COMMUNITY
End: 2022-06-14

## 2022-04-21 RX ORDER — AMLODIPINE BESYLATE 10 MG/1
10 TABLET ORAL DAILY
Qty: 90 TABLET | Refills: 1 | Status: SHIPPED | OUTPATIENT
Start: 2022-04-21 | End: 2022-09-30 | Stop reason: SDUPTHER

## 2022-04-21 RX ORDER — CYCLOBENZAPRINE HCL 10 MG
TABLET ORAL
Qty: 90 TABLET | Refills: 1 | Status: SHIPPED | OUTPATIENT
Start: 2022-04-21 | End: 2022-09-12 | Stop reason: DRUGHIGH

## 2022-04-21 RX ORDER — VALSARTAN 320 MG/1
320 TABLET ORAL DAILY
Qty: 90 TABLET | Refills: 1 | Status: SHIPPED | OUTPATIENT
Start: 2022-04-21 | End: 2022-06-16

## 2022-04-21 NOTE — PROGRESS NOTES
1. \"Have you been to the ER, urgent care clinic since your last visit? Hospitalized since your last visit?  no    2. \"Have you seen or consulted any other health care providers outside of the [address] [place] [address] since your last visit? \" no    3. For patients aged 39-70: Has the patient had a colonoscopy / FIT/ Cologuard? Cant recall    If the patient is female:    4. For patients aged 41-77: Has the patient had a mammogram within the past 2 years? na      5. For patients aged 21-65: Has the patient had a pap smear?  na        Marcin Phillips is a 64 y.o. male is coming in for with the following:     Chief Complaint   Patient presents with    Medication Refill    Medication Evaluation    Depression    Follow-up     on labs     Other     paperwork fillout out           With the following vitals:    Visit Vitals  BP (!) 174/102 (BP 1 Location: Right upper arm, BP Patient Position: Sitting, BP Cuff Size: Adult)   Pulse (!) 109   Temp 98.3 °F (36.8 °C) (Temporal)   Resp 18   Ht 6' 1\" (1.854 m)   Wt 237 lb (107.5 kg)   SpO2 98%   BMI 31.27 kg/m²

## 2022-04-21 NOTE — PROGRESS NOTES
Chief Complaint   Patient presents with    Medication Refill    Medication Evaluation    Depression    Follow-up     on labs     Other     paperwork fillout out      Visit Vitals  BP (!) 174/102 (BP 1 Location: Right upper arm, BP Patient Position: Sitting, BP Cuff Size: Adult)   Pulse (!) 109   Temp 98.3 °F (36.8 °C) (Temporal)   Resp 18   Ht 6' 1\" (1.854 m)   Wt 237 lb (107.5 kg)   SpO2 98%   BMI 31.27 kg/m²     Bridget Moncada is a 64 y.o. male. HPI:  Patient presented for follow-up and to establish with me as his PCP. Previous PCP was Dr. Idalia Gupta who has departed the practice. He needs med refills also. Medical history significant for uncontrolled type 2 diabetes, severe peripheral neuropathy, severe nonproliferative diabetic retinopathy, hypertension, insomnia, ED, carpal tunnel syndrome of both hands, hypercholesterolemia, peripheral edema,  positive TERESA test, vitamin D deficiency, and gout. Type 2 DM, uncontrolled- Review of his record indicated that his last lab work for A1c was done in February 2022 at which time his hemoglobin A1c was 9.0 versus 8.2 in August 2021 versus 9.6 in April 202. Checks bid BS- 230 in am - 210-212 in pm.  Current medication regime is glimepiride 2 mg daily, Jardiance 10 mg daily, and metformin 1000 mg twice a day. Due to the fact that he is on 3 diabetes medications, I checked his insulin plus C-peptide levels to determine if he was still making his own insulin. The result came back at 6.3 ng/ml which is elevated. Review of his record indicated he has never been referred to endocrinologist for help w/ his diabetic control. Also has diagnosis of severe nonproliferative diabetic retinopathy as a consequence of his uncontrolled type 2 diabetes. He is under the care of a retinal specialist and is receiving injections to his R eye every 5-7 weeks. Severe diabetic peripheral neuropathy- Pt stated he has burning, tingling, pin-point in both feet.  Calve are numb and cold and painful w/ aching pain. Cramping at night. Feet are cold and numb and tingling. He has had a number of falls in past due to not being able to feel his feet. He is unable to sleep due to the pain. He is under the care of neurologist Dr. Gabriela Roman. He had an EMG done by  on 11/2/2021 which established dx of severe sensorimotor polyneuropathy in hands and legs. He wears support hose which he finds helpful for the symptoms but he is not supposed to wear at night. Dr. Yunior Lloyd is the prescriber for Gabapentin 600 mg 2 tabs 3 x day. At last neurology appt 3/17/2022, Dr. Yunior Lloyd added on Lyrica 75 mg in addition to current gabapentin doses. Does not seem to be making much of a difference based on his symptoms today. His next appt w/ neuro is scheduled for 6/14/2022. Is also under the care of podiatrist Dr. Yaron Flores in [place], [place]. Hypertension- B/P in office was 174/102. Even though some pts are anxious in medical offices, this seems somewhat higher than would be expected if due to white coat syndrome. Pt stated that he checks his B/P at home and typical readings are 162/70 and 161/84. Pt was informed that goal is < 130/80. Current med regime is valsartan 160 mg daily and hydrochlorothiazide 25 mg daily. Pt stated he has had several colonoscopies in past and had polyps. He had bleeding issues and had colonoscopies done before age 48. He has not had a colonoscopy since he has turned 48. Patient stated he had carpal tunnel surgery 2 x in L arm- ulnar entrapment. Patient is concerned about his weight which was measured at 237 pounds today. He stated that his ideal weight is 220 lbs. He has tried walking more but the pain is too severe. Not sleeping because he is always in pain from the neuropathy. Stated that he is currently on long term disability. He is applying for SS disability.     Patient Active Problem List   Diagnosis Code    ED (erectile dysfunction) N52.9    Insomnia G47.00    Gout M10.9    Diabetes mellitus ([facility] [address] E11.9    Hypertension I10    Diabetes ([facility] [address] [facility] E11.9    Loss of perception for smell R43.0    Onychomycosis B35.1    Diabetic polyneuropathy associated with diabetes mellitus due to underlying condition ([facility] [address] E08.42    Vitamin D deficiency E55.9    Lesion of ulnar nerve G56.20    Stiffness of other specified joint, not elsewhere classified M25.69    Carpal tunnel syndrome of left wrist G56.02    Carpal tunnel syndrome of right wrist G56.01    Localized edema R60.0    Muscle weakness (generalized) M62.81    Anesthesia of skin R20.0    Other disturbances of skin sensation R20.8    Other general symptoms and signs R68.89    Pain in left hand M79.642    Paresthesia of skin R20.2    Uncontrolled type 2 diabetes mellitus with hyperglycemia (HCC) E11.65    Scar conditions and fibrosis of skin L90.5    Entrapment of left ulnar nerve at elbow G56.22    Ulnar nerve entrapment at right elbow G56.21    Weakness R53.1    Type 2 diabetes mellitus (HCC) E11.9    Hypercholesterolemia E78.00    Positive TERESA (antinuclear antibody) R76.8    Severe nonproliferative diabetic retinopathy associated with type 2 diabetes mellitus (HCC) H34.5872    Disordered sleep G47.9     Past Medical History:   Diagnosis Date    Diabetes ([facility] [address]     Gout     Hypertension      Past Surgical History:   Procedure Laterality Date    HX CARPAL TUNNEL RELEASE Left 09/21/2020    Ulnar release    HX HEENT      HX ORTHOPAEDIC      HX ORTHOPAEDIC Left 09/2020    Carpal tunnel, elbow and nerve transplant    HI ABDOMEN SURGERY PROC UNLISTED       Current Outpatient Medications   Medication Instructions    allopurinoL (ZYLOPRIM) 300 mg, Oral, DAILY    amitriptyline (ELAVIL) 25 mg tablet TAKE 1 TABLET BY MOUTH EVERY DAY FOR PERSISTENT INSOMNIA    amLODIPine (NORVASC) 10 mg, Oral, DAILY    Blood-Glucose Meter (Accu-Chek Guide Me Glucose Mtr) misc Use to check finger stick blood glucose TID  DX CODE E11.9    chlorthalidone (HYGROTON) 25 mg, Oral, DAILY    cyclobenzaprine (FLEXERIL) 10 mg tablet Take 1 tab by mouth at bedtime.  doxepin (SINEQUAN) 50 mg, Oral, EVERY BEDTIME    DULoxetine (CYMBALTA) 60 mg, Oral, DAILY    ergocalciferol (ERGOCALCIFEROL) 50,000 Units, Oral, EVERY 7 DAYS    gabapentin (NEURONTIN) 1,200 mg, Oral, 3 TIMES DAILY, For severe peripheral neuropathy.  glimepiride (AMARYL) 2 mg tablet TAKE 1 TABLET BY MOUTH EVERY DAY IN THE MORNING    glucose blood VI test strips (Accu-Chek Guide test strips) strip USE TO CHECK FINGER STICK BLOOD GLUCOSE 3 TIMES A DAY DX CODE E11.9    glucose blood VI test strips (FREESTYLE LITE STRIPS) strip Bid monitoring dx: 250.00    glucose blood VI test strips (FreeStyle Lite Strips) strip Use  To check sugars 3 times daily  DX CODE E11.65    Jardiance 10 mg tablet TAKE 1 TABLET BY MOUTH EVERY DAY    lancets (Accu-Chek Multiclix Lancet) misc Use to check finger stick blood glucose TID  DX CODE E11.9    Lancing Device with Lancets (Accu-Chek Multiclix Lancet) kit Use to check finger stick blood glucose TID  DX CODE E11.9    meloxicam (MOBIC) 15 mg, Oral, DAILY    metFORMIN ER (GLUCOPHAGE XR) 500 mg tablet Take 2 tabs by mouth before breakfast and 2 tabs before evening meal daily.  pregabalin (LYRICA) 75 mg, Oral, EVERY BEDTIME    sildenafil citrate (VIAGRA) 100 mg tablet sildenafil 100 mg tablet   Take 1 tablet every day by oral route.     valsartan (DIOVAN) 320 mg, Oral, DAILY     Allergies   Allergen Reactions    Teicoplanin Rash     Social History     Tobacco Use    Smoking status: Never Smoker    Smokeless tobacco: Never Used   Vaping Use    Vaping Use: Never used   Substance Use Topics    Alcohol use: No    Drug use: No     Family History   Problem Relation Age of Onset    Hypertension Mother     Diabetes Father     Stroke Father     Heart Disease Father     Heart Attack Father      Review of Systems Constitutional: Negative for chills, fever and malaise/fatigue. HENT: Negative for congestion, ear pain and sore throat. Respiratory: Negative for cough, shortness of breath and wheezing. Cardiovascular: Positive for leg swelling. Negative for chest pain and palpitations. Gastrointestinal: Negative. Negative for abdominal pain, constipation, diarrhea, heartburn, nausea and vomiting. Genitourinary: Negative for dysuria. Musculoskeletal: Positive for joint pain. Negative for back pain, falls, myalgias and neck pain. Neurological: Positive for tingling and sensory change. Negative for dizziness, weakness and headaches. Psychiatric/Behavioral: Negative for depression. The patient is nervous/anxious and has insomnia. Objective  Physical Exam  Vitals reviewed. Constitutional:       General: He is not in acute distress. Appearance: Normal appearance. HENT:      Head: Normocephalic. Right Ear: External ear normal.      Left Ear: External ear normal.      Nose: Nose normal.   Eyes:      Conjunctiva/sclera: Conjunctivae normal.   Neck:      Vascular: No carotid bruit. Cardiovascular:      Rate and Rhythm: Regular rhythm. Tachycardia present. Heart sounds: Normal heart sounds. No murmur heard. Pulmonary:      Effort: Pulmonary effort is normal. No respiratory distress. Breath sounds: Normal breath sounds. Musculoskeletal:         General: No swelling or tenderness. Normal range of motion. Cervical back: Neck supple. Right lower leg: No edema. Left lower leg: No edema. Skin:     General: Skin is warm and dry. Coloration: Skin is not jaundiced. Findings: No lesion or rash. Neurological:      Mental Status: He is alert and oriented to person, place, and time. Sensory: Sensory deficit present. Motor: No weakness.       Gait: Gait normal.   Psychiatric:         Mood and Affect: Mood normal.         Behavior: Behavior normal. Thought Content: Thought content normal.         Judgment: Judgment normal.       Assessment & Plan      ICD-10-CM ICD-9-CM    1. Uncontrolled type 2 diabetes mellitus with hyperglycemia (HCC)  E11.65 250.02 metFORMIN ER (GLUCOPHAGE XR) 500 mg tablet      REFERRAL TO ENDOCRINOLOGY   2. Primary hypertension  I10 401.9 chlorthalidone (HYGROTON) 25 mg tablet      amLODIPine (NORVASC) 10 mg tablet      valsartan (DIOVAN) 320 mg tablet   3. Chronic back pain, unspecified back location, unspecified back pain laterality  M54.9 724.5 cyclobenzaprine (FLEXERIL) 10 mg tablet    G89.29 338.29    4. Screening for malignant neoplasm of colon  Z12.11 V76.51 REFERRAL TO GASTROENTEROLOGY     1. Uncontrolled type 2 diabetes mellitus with hyperglycemia [facility])  Patient has had GI issues with the immediate release metformin. He was agreeable to switching to the extended release form so I will change the formulation today. Also discussed referral to endocrinology as it is a central that his diabetes be under better control to mitigate his current diabetic complications and prevent any future complications. He was agreeable to the referral.  Advised to call the Endo office and make an appointment ASAP. He stated understanding.    - metFORMIN ER (GLUCOPHAGE XR) 500 mg tablet; Take 2 tabs by mouth before breakfast and 2 tabs before evening meal daily. Indications: type 2 diabetes mellitus  Dispense: 360 Tablet; Refill: 1  - REFERRAL TO ENDOCRINOLOGY    2. Primary hypertension  Discussed changing his hydrochlorothiazide to chlorthalidone which has a longer half-life and which will provide longer action for hypertension control. He was agreeable. He was also agreeable to adding on amlodipine to current valsartan. Advised to continue to check his BP daily with goal less than 130/80.    - chlorthalidone (HYGROTON) 25 mg tablet; Take 1 Tablet by mouth daily. Indications: high blood pressure  Dispense: 90 Tablet;  Refill: 1  - amLODIPine (NORVASC) 10 mg tablet; Take 1 Tablet by mouth daily. Dispense: 90 Tablet; Refill: 1  - valsartan (DIOVAN) 320 mg tablet; Take 1 Tablet by mouth daily. Dispense: 90 Tablet; Refill: 1    3. Chronic back pain, unspecified back location, unspecified back pain laterality  Patient finds effective for his back pain so we will continue at current dosage and frequency. - cyclobenzaprine (FLEXERIL) 10 mg tablet; Take 1 tab by mouth at bedtime. Dispense: 90 Tablet; Refill: 1    4. Screening for malignant neoplasm of colon  Patient was agreeable to referral to gastro to schedule another colonoscopy which appears to be overdue. Advised to call for an appointment at gastro office ASAP. Stated understanding.    - REFERRAL TO GASTROENTEROLOGY    I have discussed the diagnosis with the patient and the intended plan as seen in the above orders. Pt/caretaker has expressed understanding. Questions were answered concerning future plans. I have discussed medication side effects and warnings as indicated with the patient as well.     Dwain Mckeon, SORAYA

## 2022-05-05 ENCOUNTER — TELEPHONE (OUTPATIENT)
Dept: FAMILY MEDICINE CLINIC | Age: 57
End: 2022-05-05

## 2022-06-03 ENCOUNTER — OFFICE VISIT (OUTPATIENT)
Dept: FAMILY MEDICINE CLINIC | Age: 57
End: 2022-06-03
Payer: COMMERCIAL

## 2022-06-03 VITALS
HEIGHT: 73 IN | WEIGHT: 239 LBS | HEART RATE: 85 BPM | OXYGEN SATURATION: 98 % | SYSTOLIC BLOOD PRESSURE: 149 MMHG | BODY MASS INDEX: 31.68 KG/M2 | RESPIRATION RATE: 18 BRPM | TEMPERATURE: 96.8 F | DIASTOLIC BLOOD PRESSURE: 84 MMHG

## 2022-06-03 DIAGNOSIS — M1A.9XX0 CHRONIC GOUT INVOLVING TOE OF RIGHT FOOT WITHOUT TOPHUS, UNSPECIFIED CAUSE: ICD-10-CM

## 2022-06-03 DIAGNOSIS — Z11.59 ENCOUNTER FOR HEPATITIS C SCREENING TEST FOR LOW RISK PATIENT: ICD-10-CM

## 2022-06-03 DIAGNOSIS — E78.00 HYPERCHOLESTEROLEMIA: ICD-10-CM

## 2022-06-03 DIAGNOSIS — E11.65 UNCONTROLLED TYPE 2 DIABETES MELLITUS WITH HYPERGLYCEMIA (HCC): Primary | ICD-10-CM

## 2022-06-03 DIAGNOSIS — E08.42 DIABETIC POLYNEUROPATHY ASSOCIATED WITH DIABETES MELLITUS DUE TO UNDERLYING CONDITION (HCC): ICD-10-CM

## 2022-06-03 DIAGNOSIS — E55.9 VITAMIN D DEFICIENCY: ICD-10-CM

## 2022-06-03 DIAGNOSIS — Z79.899 LONG TERM CURRENT USE OF THERAPEUTIC DRUG: ICD-10-CM

## 2022-06-03 PROBLEM — L90.5 SCAR CONDITIONS AND FIBROSIS OF SKIN: Status: ACTIVE | Noted: 2022-06-03

## 2022-06-03 PROBLEM — R53.1 WEAKNESS: Status: ACTIVE | Noted: 2022-06-03

## 2022-06-03 PROBLEM — G56.22 ENTRAPMENT OF LEFT ULNAR NERVE AT ELBOW: Status: ACTIVE | Noted: 2022-06-03

## 2022-06-03 PROBLEM — E11.9 TYPE 2 DIABETES MELLITUS (HCC): Status: ACTIVE | Noted: 2022-06-03

## 2022-06-03 PROBLEM — G56.21 ULNAR NERVE ENTRAPMENT AT RIGHT ELBOW: Status: ACTIVE | Noted: 2022-06-03

## 2022-06-03 PROCEDURE — 3046F HEMOGLOBIN A1C LEVEL >9.0%: CPT | Performed by: NURSE PRACTITIONER

## 2022-06-03 PROCEDURE — 99214 OFFICE O/P EST MOD 30 MIN: CPT | Performed by: NURSE PRACTITIONER

## 2022-06-03 RX ORDER — DULOXETIN HYDROCHLORIDE 60 MG/1
60 CAPSULE, DELAYED RELEASE ORAL DAILY
Qty: 90 CAPSULE | Refills: 1 | Status: SHIPPED | OUTPATIENT
Start: 2022-06-03

## 2022-06-03 NOTE — PROGRESS NOTES
Chief Complaint   Patient presents with    Diabetes     F/u visitb paper work ? Visit Vitals  BP (!) 149/84 (BP 1 Location: Left arm, BP Patient Position: Sitting)   Pulse 85   Temp 96.8 °F (36 °C) (Temporal)   Resp 18   Ht 6' 1\" (1.854 m)   Wt 239 lb (108.4 kg)   SpO2 98%   BMI 31.53 kg/m²     Subjective  Anson Stagger is a 64 y.o. male. HPI   Patient presented for follow-up. Medical history significant for uncontrolled type 2 diabetes, severe peripheral neuropathy, severe nonproliferative diabetic retinopathy, hypertension, insomnia, ED, carpal tunnel syndrome of both hands, hypercholesterolemia, peripheral edema,  positive TERESA test, vitamin D deficiency, and gout. Type 2 DM, uncontrolled- Lab work for A1c in February 2022 was 9.0 versus 8.2 in August 2021 versus 9.6 in April 202. Current medication regime is glimepiride 2 mg daily, Jardiance 10 mg daily, and metformin ER 1000 mg twice a day. He was referred to endocrinologist at last appt. Earliest appt he could get was July 18/2022. Will order labs again today so endo has current values for appt. Discussed referral to diabetic education program.  Patient was receptive. Patient was advised that after I put in the referral and he should be hearing from program staff to schedule his teaching appointments. Severe diabetic peripheral neuropathy- Pt stated he has burning, tingling, pin prick feeling in both feet. Calves are numb and cold and painful w/ aching pain. Cramping at night. Feet are cold and numb and tingling. He has had a number of falls in past due to not being able to feel his feet. He is unable to sleep due to the pain. Was advised to check his feet daily to ensure he has not stepped on something, acquired a blister, or has redness or irritation from shoes rubbing on his feet. He stated he never walks barefoot. He is under the care of neurologist Dr. Maxim Cee.  He had an EMG done by  on 11/2/2021 which established dx of severe sensorimotor polyneuropathy. Dr. Rosa Rivera is the prescriber for Gabapentin 600 mg 2 tabs 3 x day. At last neurology appt 3/17/2022, Dr. Rosa Rivera added on Lyrica 75 mg in addition to current gabapentin. Does not seem to be particularly effective for symptoms   His next appt w/ neuro is scheduled for 6/14/2022. Hypertension- B/P 149/84 in office. Pt had a number of med changes at last appt. He stated that at home his B/P is closer to goal < 130/80 than in office today. Pt advised to continue daily home B/P checks. Can again adjust his medication at a future date if not consistently at goal.   Vitamin D deficiency- vitamin D level was checked on 4/15/2022 at which time it was 18.8. He was started on high-dose vitamin D 50,000 units once weekly x12 weeks prior to last appointment and continues the weekly medication. He was advised to get over-the-counter vitamin D 1000 units after he has completed all the weekly doses and then start taking 1 tab daily to maintain his vitamin D levels. We will recheck his levels at a later date. Patient had positive TERESA test first identified in 2021. He was referred to rheumatologist Dr. Rhianna Hong but review of his record indicated he was not seen for unclear reason. However, Dr. Kaushik Stack has closed his office so patient will need a referral to a new rheumatology provider. Patient was receptive to a new referral but he is interested in getting his diabetes under control first before seeing a new specialist.  Patient still needs to make an appointment at GI office to schedule his colonoscopy.     Patient Active Problem List   Diagnosis Code    ED (erectile dysfunction) N52.9    Insomnia G47.00    Gout M10.9    Diabetes mellitus ([address] E11.9    Hypertension I10    Diabetes ([address] E11.9    Loss of perception for smell R43.0    Onychomycosis B35.1    Diabetic polyneuropathy associated with diabetes mellitus due to underlying condition ([address] U12.47    Vitamin D deficiency E55.9    Lesion of ulnar nerve G56.20    Stiffness of other specified joint, not elsewhere classified M25.69    Carpal tunnel syndrome of left wrist G56.02    Carpal tunnel syndrome of right wrist G56.01    Localized edema R60.0    Muscle weakness (generalized) M62.81    Anesthesia of skin R20.0    Other disturbances of skin sensation R20.8    Other general symptoms and signs R68.89    Pain in left hand M79.642    Paresthesia of skin R20.2    Uncontrolled type 2 diabetes mellitus with hyperglycemia (HCC) E11.65    Scar conditions and fibrosis of skin L90.5    Entrapment of left ulnar nerve at elbow G56.22    Ulnar nerve entrapment at right elbow G56.21    Weakness R53.1    Type 2 diabetes mellitus (HCC) E11.9    Hypercholesterolemia E78.00    Positive TERESA (antinuclear antibody) R76.8    Severe nonproliferative diabetic retinopathy associated with type 2 diabetes mellitus (HCC) E64.1620    Disordered sleep G47.9     Past Medical History:   Diagnosis Date    Diabetes ([address]     Gout     Hypertension      Past Surgical History:   Procedure Laterality Date    HX CARPAL TUNNEL RELEASE Left 09/21/2020    Ulnar release    HX HEENT      HX ORTHOPAEDIC      HX ORTHOPAEDIC Left 09/2020    Carpal tunnel, elbow and nerve transplant    [place] ABDOMEN SURGERY PROC UNLISTED       Current Outpatient Medications   Medication Instructions    allopurinoL (ZYLOPRIM) 300 mg, Oral, DAILY    amitriptyline (ELAVIL) 25 mg tablet TAKE 1 TABLET BY MOUTH EVERY DAY FOR PERSISTENT INSOMNIA    amLODIPine (NORVASC) 10 mg, Oral, DAILY    Blood-Glucose Meter (Accu-Chek Guide Me Glucose Mtr) misc Use to check finger stick blood glucose TID  DX CODE E11.9    chlorthalidone (HYGROTON) 25 mg, Oral, DAILY    cyclobenzaprine (FLEXERIL) 10 mg tablet Take 1 tab by mouth at bedtime.     doxepin (SINEQUAN) 50 mg, Oral, EVERY BEDTIME    DULoxetine (CYMBALTA) 60 mg, Oral, DAILY    ergocalciferol (ERGOCALCIFEROL) 50,000 Units, Oral, EVERY 7 DAYS    gabapentin (NEURONTIN) 1,200 mg, Oral, 3 TIMES DAILY, For severe peripheral neuropathy.  glimepiride (AMARYL) 2 mg tablet TAKE 1 TABLET BY MOUTH EVERY DAY IN THE MORNING    glucose blood VI test strips (Accu-Chek Guide test strips) strip USE TO CHECK FINGER STICK BLOOD GLUCOSE 3 TIMES A DAY DX CODE E11.9    glucose blood VI test strips (FREESTYLE LITE STRIPS) strip Bid monitoring dx: 250.00    glucose blood VI test strips (FreeStyle Lite Strips) strip Use  To check sugars 3 times daily  DX CODE E11.65    Jardiance 10 mg tablet TAKE 1 TABLET BY MOUTH EVERY DAY    lancets (Accu-Chek Multiclix Lancet) misc Use to check finger stick blood glucose TID  DX CODE E11.9    Lancing Device with Lancets (Accu-Chek Multiclix Lancet) kit Use to check finger stick blood glucose TID  DX CODE E11.9    meloxicam (MOBIC) 15 mg, Oral, DAILY    metFORMIN ER (GLUCOPHAGE XR) 500 mg tablet Take 2 tabs by mouth before breakfast and 2 tabs before evening meal daily.  pregabalin (LYRICA) 75 mg, Oral, EVERY BEDTIME    rosuvastatin (CRESTOR) 20 mg, Oral, EVERY BEDTIME    sildenafil citrate (VIAGRA) 100 mg tablet sildenafil 100 mg tablet   Take 1 tablet every day by oral route.  valsartan (DIOVAN) 320 mg, Oral, DAILY     Allergies   Allergen Reactions    Teicoplanin Rash     Social History     Tobacco Use    Smoking status: Never Smoker    Smokeless tobacco: Never Used   Vaping Use    Vaping Use: Never used   Substance Use Topics    Alcohol use: No    Drug use: No     Family History   Problem Relation Age of Onset    Hypertension Mother     Diabetes Father     Stroke Father     Heart Disease Father     Heart Attack Father      Review of Systems   Constitutional: Negative for chills, fever and malaise/fatigue. HENT: Negative for congestion, ear pain and sore throat. Respiratory: Negative for cough, shortness of breath and wheezing.     Cardiovascular: Negative for chest pain, palpitations and leg swelling. Gastrointestinal: Negative for abdominal pain, constipation, diarrhea, heartburn, nausea and vomiting. Genitourinary: Negative for dysuria. Musculoskeletal: Positive for back pain. Negative for falls, joint pain, myalgias and neck pain. Neurological: Positive for tingling and sensory change. Negative for dizziness, weakness and headaches. Psychiatric/Behavioral: Negative for depression. The patient has insomnia. The patient is not nervous/anxious. Objective  Physical Exam  Constitutional:       General: He is not in acute distress. Appearance: Normal appearance. HENT:      Head: Normocephalic. Right Ear: External ear normal.      Left Ear: External ear normal.      Nose: Nose normal.   Eyes:      Conjunctiva/sclera: Conjunctivae normal.   Neck:      Vascular: No carotid bruit. Cardiovascular:      Rate and Rhythm: Normal rate and regular rhythm. Heart sounds: Normal heart sounds. No murmur heard. Pulmonary:      Effort: Pulmonary effort is normal. No respiratory distress. Breath sounds: Normal breath sounds. Musculoskeletal:         General: No swelling or tenderness. Normal range of motion. Cervical back: Neck supple. Right lower leg: No edema. Left lower leg: No edema. Skin:     General: Skin is warm and dry. Coloration: Skin is not jaundiced. Findings: No lesion or rash. Neurological:      Mental Status: He is alert and oriented to person, place, and time. Sensory: Sensory deficit present. Motor: No weakness. Gait: Gait normal.   Psychiatric:         Behavior: Behavior normal.         Thought Content: Thought content normal.         Judgment: Judgment normal.      Comments: Anxious       Assessment & Plan      ICD-10-CM ICD-9-CM    1.  Uncontrolled type 2 diabetes mellitus with hyperglycemia (HCC)  E18.57 483.60 METABOLIC PANEL, COMPREHENSIVE      HEMOGLOBIN A1C WITH EAG      MICROALBUMIN, UR, RAND W/ MICROALB/CREAT RATIO      REFERRAL TO DIABETIC EDUCATION   2. Diabetic polyneuropathy associated with diabetes mellitus due to underlying condition (HCC)  E08.42 249.60 DULoxetine (CYMBALTA) 60 mg capsule     357.2    3. Hypercholesterolemia  E78.00 272.0 CBC WITH AUTOMATED DIFF      RETICULOCYTE COUNT      LIPID PANEL      rosuvastatin (CRESTOR) 20 mg tablet   4. 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 9XX0 274.02 URIC ACID   5. Vitamin D deficiency  E55.9 268.9 VITAMIN D, 25 HYDROXY   6. Long term current use of therapeutic drug  Z79.899 V58.69 VITAMIN B12 & FOLATE   7. Encounter for hepatitis C screening test for low risk patient  Z11.59 V73.89 HEPATITIS C AB, RFLX TO QT BY PCR     1. Uncontrolled type 2 diabetes mellitus with hyperglycemia ([facility]  Ending appointment with endocrinologist Dr. Sofya Hutchins for evaluation. Patient was receptive to referral for diabetic education so I am hopeful he will follow through. He has knowledge deficits regarding diabetic control to include carb counting and heart healthy diet. - METABOLIC PANEL, COMPREHENSIVE  - HEMOGLOBIN A1C WITH EAG  - MICROALBUMIN, UR, RAND W/ MICROALB/CREAT RATIO  - REFERRAL TO DIABETIC EDUCATION    2. Diabetic polyneuropathy associated with diabetes mellitus due to underlying condition [facility])  Patient is on duloxetine as an adjunct to his other treatment for neuropathy symptoms which is gabapentin and Lyrica. Discussed increasing the duloxetine to 60 mg and he was receptive. His upcoming appointment with neurologist on 6/14/2022.    - DULoxetine (CYMBALTA) 60 mg capsule; Take 1 Capsule by mouth daily. Dispense: 90 Capsule; Refill: 1    3. Hypercholesterolemia  Last results with  mg/dL and TGs 228 mg/dL. Not at goal for LDL less than 70 patients with diabetes and triglycerides less than 150. Patient has never been on a statin as near as I can tell by review of his current record.   Unclear reason why.  Will start him on rosuvastatin and recheck his labs at a future date. - CBC WITH AUTOMATED DIFF  - RETICULOCYTE COUNT  - LIPID PANEL    - rosuvastatin (Crestor) 20 mg tablet    4. 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 Patient has not had an attack in years. He currently is taking allopurinol 100 mg daily. Has not had a uric acid in some time so we will recheck today. - URIC ACID    5. Vitamin D deficiency  We will adjust his supplement dose if indicated  - VITAMIN D, 25 HYDROXY    6. Long term current use of therapeutic drug  Patient is on metformin which can affect B12 and folate levels so will check today and add on supplementation if indicated. - VITAMIN B12 & FOLATE    7. Encounter for hepatitis C screening test for low risk patient  One time recommended screening for pt in his age group per new guidelines. - HEPATITIS C AB, RFLX TO QT BY PCR    I have discussed the diagnosis with the patient and the intended plan as seen in the above orders. Pt/caretaker has expressed understanding. Questions were answered concerning future plans. I have discussed medication side effects and warnings as indicated with the patient as well.     Xiomara Hyman NP     I  do attest that this note was reviewed and I was available for  SORAYA THOMPSON on this day of service and will follow along with SORAYA Ulloa MD

## 2022-06-03 NOTE — PROGRESS NOTES
Chief Complaint   Patient presents with    Diabetes     F/u visitb paper work ? Visit Vitals  BP (!) 149/84 (BP 1 Location: Left arm, BP Patient Position: Sitting)   Pulse 85   Temp 96.8 °F (36 °C) (Temporal)   Resp 18   Ht 6' 1\" (1.854 m)   Wt 239 lb (108.4 kg)   SpO2 98%   BMI 31.53 kg/m²     1. Have you been to the ER, urgent care clinic since your last visit? Hospitalized since your last visit? No    2. Have you seen or consulted any other health care providers outside of the [address] [place] [address] since your last visit? Include any pap smears or colon screening.  No

## 2022-06-05 PROBLEM — E11.3499 SEVERE NONPROLIFERATIVE DIABETIC RETINOPATHY ASSOCIATED WITH TYPE 2 DIABETES MELLITUS (HCC): Status: ACTIVE | Noted: 2022-06-05

## 2022-06-05 PROBLEM — E78.00 HYPERCHOLESTEROLEMIA: Status: ACTIVE | Noted: 2022-06-05

## 2022-06-05 PROBLEM — R76.8 POSITIVE ANA (ANTINUCLEAR ANTIBODY): Status: ACTIVE | Noted: 2022-06-05

## 2022-06-05 PROBLEM — G47.9 DISORDERED SLEEP: Status: ACTIVE | Noted: 2022-06-05

## 2022-06-05 RX ORDER — ROSUVASTATIN CALCIUM 20 MG/1
20 TABLET, COATED ORAL
Qty: 90 TABLET | Refills: 1 | Status: SHIPPED | OUTPATIENT
Start: 2022-06-05 | End: 2022-09-30 | Stop reason: SDUPTHER

## 2022-06-14 ENCOUNTER — OFFICE VISIT (OUTPATIENT)
Dept: NEUROLOGY | Age: 57
End: 2022-06-14
Payer: COMMERCIAL

## 2022-06-14 VITALS
BODY MASS INDEX: 31.68 KG/M2 | SYSTOLIC BLOOD PRESSURE: 126 MMHG | HEIGHT: 73 IN | RESPIRATION RATE: 16 BRPM | HEART RATE: 78 BPM | DIASTOLIC BLOOD PRESSURE: 76 MMHG | WEIGHT: 239 LBS | OXYGEN SATURATION: 99 %

## 2022-06-14 DIAGNOSIS — E11.42 DIABETIC SENSORIMOTOR NEUROPATHY (HCC): Primary | ICD-10-CM

## 2022-06-14 DIAGNOSIS — M51.36 DDD (DEGENERATIVE DISC DISEASE), LUMBAR: ICD-10-CM

## 2022-06-14 PROCEDURE — 3046F HEMOGLOBIN A1C LEVEL >9.0%: CPT | Performed by: PSYCHIATRY & NEUROLOGY

## 2022-06-14 PROCEDURE — 99215 OFFICE O/P EST HI 40 MIN: CPT | Performed by: PSYCHIATRY & NEUROLOGY

## 2022-06-14 RX ORDER — GABAPENTIN 600 MG/1
1200 TABLET ORAL 3 TIMES DAILY
Qty: 180 TABLET | Refills: 2 | Status: SHIPPED | OUTPATIENT
Start: 2022-06-14 | End: 2022-09-20 | Stop reason: SDUPTHER

## 2022-06-14 RX ORDER — PREGABALIN 100 MG/1
100 CAPSULE ORAL 3 TIMES DAILY
Qty: 270 CAPSULE | Refills: 0 | Status: SHIPPED | OUTPATIENT
Start: 2022-06-14 | End: 2022-09-12

## 2022-06-14 NOTE — PROGRESS NOTES
Piotr Johnson (1965) is a 64 y.o. male, established patient, here for evaluation of the following     Chief complaint(s):   Chief Complaint   Patient presents with    Follow-up     3 months for his neuropathy, patient wants office notes sent [place] OBJECTIVE:    HPI: 64 y.o. male      On Gabapentin 600 mg, two tabs TID    Last visit, added Lyrica 75 mg BID    For insomnia PCP has added Amitriptyline 50 mg (up to 3 tabs QHS) and Doxepin 50 mg (one tab QHS) and he alternates between the two. Tried/ failed Trazodone    Patient cannot tell any benefit to the addition of Lyrica. He denies any side effects. He still complains of severe neuropathy pains up to both of his legs, and involving his hands (also had bilateral CTS surgery). He tells me that his PCP has left the clinic and he is now under the care of NP Evan Barnett, and that she added Cymbalta 60 mg/ day for his Neuropathy pain, but she did express to him that she didn't want to interfere with Neurology's management of his neuropathy. I d/w patient that I don't have any problem with SORAYA Harris having added the Cymbalta. He says it's been 4-6 weeks since the Cymbalta was added, but he has not really seen an improvement yet in terms of his neuropathy pain. He had questions about commercials he has seen on TV for neuropathy clinics. We discussed that those clinics generally offer treatments that are not covered by insurance and so I don't recommend them. Due to the severity, intractability of his Diabetic Neuropathy Pain we discussed that a more legitimate option may be (if his insurance covers) a spinal cord stimulator. We discussed what that was. D/w him that I would have to refer him to an in interventional pain management clinic to discuss with the pain specialist whether this would be an option for him.   Patient wants to try increasing his neuropathy meds to see if that helps, but he is also open to seeing pain management to discuss possibly spinal cord stimulator trial.      ========================================    Brief Hx:     Initial Visit: 9-14-21 (see for full details)    EMG (11-2-2021, bilateral lower extremities): 1) Severe sensorimotor polyneuropathy with absent tibial F waves and tibial H reflexes (due to absent right tibial motor response and near absent left tibial motor response). 2) Chronic active denervation in distal lower extremity muscles consistent with severity of underlying peripheral neuropathy. Likely due to history of longstanding diabetes mellitus. If any clinical concern of a demyelinating neuropathy, recommend checking bilateral upper extremity EMG nerve conduction studies including F waves). 3) There is active denervation in the left gluteus yung (proximal S1 muscle), compatible with a chronic, active, left S1 lumbar radiculopathy. 4) No electrodiagnostic findings to suggest right lumbar radiculopathy. Allergies   Allergen Reactions    Teicoplanin Rash         Current Outpatient Medications:     gabapentin (NEURONTIN) 600 mg tablet, Take 2 Tablets by mouth three (3) times daily. Max Daily Amount: 3,600 mg. For severe peripheral neuropathy. , Disp: 180 Tablet, Rfl: 2    pregabalin (LYRICA) 100 mg capsule, Take 1 Capsule by mouth three (3) times daily for 90 days. Max Daily Amount: 300 mg., Disp: 270 Capsule, Rfl: 0    rosuvastatin (CRESTOR) 20 mg tablet, Take 1 Tablet by mouth nightly., Disp: 90 Tablet, Rfl: 1    DULoxetine (CYMBALTA) 60 mg capsule, Take 1 Capsule by mouth daily. , Disp: 90 Capsule, Rfl: 1    metFORMIN ER (GLUCOPHAGE XR) 500 mg tablet, Take 2 tabs by mouth before breakfast and 2 tabs before evening meal daily. Indications: type 2 diabetes mellitus, Disp: 360 Tablet, Rfl: 1    chlorthalidone (HYGROTON) 25 mg tablet, Take 1 Tablet by mouth daily. Indications: high blood pressure, Disp: 90 Tablet, Rfl: 1    amLODIPine (NORVASC) 10 mg tablet, Take 1 Tablet by mouth daily. , Disp: 90 Tablet, Rfl: 1    cyclobenzaprine (FLEXERIL) 10 mg tablet, Take 1 tab by mouth at bedtime. , Disp: 90 Tablet, Rfl: 1    ergocalciferol (ERGOCALCIFEROL) 1,250 mcg (50,000 unit) capsule, Take 1 Capsule by mouth every seven (7) days. Indications: vitamin D deficiency (high dose therapy), Disp: 12 Capsule, Rfl: 0    allopurinoL (ZYLOPRIM) 300 mg tablet, Take 1 Tablet by mouth daily. , Disp: 90 Tablet, Rfl: 0    doxepin (SINEquan) 50 mg capsule, Take 1 Capsule by mouth nightly., Disp: 90 Capsule, Rfl: 1    amitriptyline (ELAVIL) 25 mg tablet, TAKE 1 TABLET BY MOUTH EVERY DAY FOR PERSISTENT INSOMNIA, Disp: 90 Tablet, Rfl: 2    glucose blood VI test strips (Accu-Chek Guide test strips) strip, USE TO CHECK FINGER STICK BLOOD GLUCOSE 3 TIMES A DAY DX CODE E11.9, Disp: 300 Strip, Rfl: 3    glimepiride (AMARYL) 2 mg tablet, TAKE 1 TABLET BY MOUTH EVERY DAY IN THE MORNING, Disp: 90 Tablet, Rfl: 1    Jardiance 10 mg tablet, TAKE 1 TABLET BY MOUTH EVERY DAY, Disp: 90 Tablet, Rfl: 3    Blood-Glucose Meter (Accu-Chek Guide Me Glucose Mtr) misc, Use to check finger stick blood glucose TID  DX CODE E11.9, Disp: 1 Each, Rfl: 0    Lancing Device with Lancets (Accu-Chek Multiclix Lancet) kit, Use to check finger stick blood glucose TID  DX CODE E11.9, Disp: 1 Kit, Rfl: 0    lancets (Accu-Chek Multiclix Lancet) misc, Use to check finger stick blood glucose TID  DX CODE E11.9, Disp: 300 Each, Rfl: 3    glucose blood VI test strips (FreeStyle Lite Strips) strip, Use  To check sugars 3 times daily  DX CODE E11.65, Disp: 300 Strip, Rfl: 5    glucose blood VI test strips (FREESTYLE LITE STRIPS) strip, Bid monitoring dx: 250.00, Disp: 1 Package, Rfl: 11    meloxicam (MOBIC) 15 mg tablet, Take 15 mg by mouth daily. , Disp: , Rfl:     sildenafil citrate (VIAGRA) 100 mg tablet, sildenafil 100 mg tablet  Take 1 tablet every day by oral route.  (Patient not taking: Reported on 6/3/2022), Disp: , Rfl:     valsartan (DIOVAN) 320 mg tablet, Take 1 Tablet by mouth daily. , Disp: 90 Tablet, Rfl: 1     has a past medical history of Diabetes ([address] Gout, and Hypertension. has a past surgical history that includes hx heent; pr abdomen surgery proc unlisted; hx carpal tunnel release (Left, 09/21/2020); hx orthopaedic; and hx orthopaedic (Left, 09/2020). Physical Exam:    Vitals:    06/14/22 1037   BP: 126/76   Pulse: 78   Resp: 16   Height: 6' 1\" (1.854 m)   Weight: 108.4 kg (239 lb)   SpO2: 99%      Awake alert resting calm conversant    EOMI, hearing normal, speech normal, visual fields grossly normal    Reduced light touch all the way up to mid thighs, normal in the abdomen and forearms, reduced in the hands      ========================================    ASSESSMENT/ PLAN:       ICD-10-CM ICD-9-CM    1. Diabetic sensorimotor neuropathy (HCC)  E11.42 250.60 gabapentin (NEURONTIN) 600 mg tablet     357.2 pregabalin (LYRICA) 100 mg capsule      REFERRAL TO PAIN MANAGEMENT   2. DDD (degenerative disc disease), lumbar  M51.36 722. 52 MRI LUMB SPINE W WO CONT      Continue Gabapentin 600 mg tablet, 2 tablets 3 times a day. Advised patient to increase existing Lyrica 75 mg capsules to 2 capsules twice a day and when he runs out switch over to taking Lyrica 100 mg 1 capsule 3 times a day. Discussed with him that at next visit with PCP if he cannot say that his neuropathy pain has improved after increasing Lyrica then I am okay with SORAYA Dale/PCP increasing the Cymbalta if she would like to. Entered referral for patient to see Dr. Ritchie Owen pain management to discuss possibility of spinal cord stimulator trial for his severe, intractable painful diabetic peripheral neuropathy. Patient has a history of lumbar spine surgery and does not have any recent spinal imaging on file. Ordered MRI lumbar spine with and without contrast so that Dr. Adelso Francois will be able to review that when patient sees him.     Patient had requested some records be sent to his disability insurance; nurse will send those records in the next few days    Follow-up with me in 3 months      An electronic signature was used to authenticate this note.   -- Patricia Leary MD

## 2022-06-14 NOTE — LETTER
6/14/2022    Patient: Levar Coe   YOB: 1965   Date of Visit: 6/14/2022     Xiomara Hyman NP  [facility] [address] [address]  [place]    Dear Xiomara Hyman NP,      Thank you for referring Mr. Pepe Keyes to [address] [place] [address] for evaluation. My notes for this consultation are attached. If you have questions, please do not hesitate to call me. I look forward to following your patient along with you.       Sincerely,    Ting Wright MD

## 2022-06-14 NOTE — PROGRESS NOTES
Chief Complaint   Patient presents with    Follow-up     3 months for his neuropathy, patient wants office notes sent [place]      Visit Vitals  /76   Pulse 78   Resp 16   Ht 6' 1\" (1.854 m)   Wt 108.4 kg (239 lb)   SpO2 99%   BMI 31.53 kg/m²

## 2022-06-22 ENCOUNTER — CLINICAL SUPPORT (OUTPATIENT)
Dept: DIABETES SERVICES | Age: 57
End: 2022-06-22
Payer: COMMERCIAL

## 2022-06-22 DIAGNOSIS — E11.69 TYPE 2 DIABETES MELLITUS WITH OTHER SPECIFIED COMPLICATION, WITHOUT LONG-TERM CURRENT USE OF INSULIN (HCC): Primary | ICD-10-CM

## 2022-06-22 PROCEDURE — G0108 DIAB MANAGE TRN  PER INDIV: HCPCS

## 2022-07-25 DIAGNOSIS — F51.01 PRIMARY INSOMNIA: ICD-10-CM

## 2022-07-25 DIAGNOSIS — E11.9 TYPE 2 DIABETES MELLITUS WITHOUT COMPLICATION, WITHOUT LONG-TERM CURRENT USE OF INSULIN (HCC): ICD-10-CM

## 2022-07-25 NOTE — TELEPHONE ENCOUNTER
[facility] faxed over refill request for Doxepin 50 mg caps,Glimepride 2 mg tabs and Jardiance 10 mg tabs orders are in

## 2022-07-26 RX ORDER — DOXEPIN HYDROCHLORIDE 50 MG/1
50 CAPSULE ORAL
Qty: 90 CAPSULE | Refills: 1 | Status: SHIPPED | OUTPATIENT
Start: 2022-07-26 | End: 2022-09-02

## 2022-07-26 RX ORDER — GLIMEPIRIDE 2 MG/1
2 TABLET ORAL DAILY
Qty: 90 TABLET | Refills: 1 | Status: SHIPPED | OUTPATIENT
Start: 2022-07-26 | End: 2022-09-30 | Stop reason: SDUPTHER

## 2022-07-26 NOTE — TELEPHONE ENCOUNTER
Last OV: 6/3/22  Next OV: 9/2/22  Last Refill: 12/22/21 (Doxepin, QTY 90, Refills 1), 10/18/21 (Glimepiride, QTY 90, Refills 1), 10/8/21 (Jardiance, QTY 90, Refills 3)  Last micoalbumin: 4/15/22  Last A1c and CMP: 2/16/22    Will send to provider for review/authorization

## 2022-08-08 ENCOUNTER — CLINICAL SUPPORT (OUTPATIENT)
Dept: DIABETES SERVICES | Age: 57
End: 2022-08-08
Payer: COMMERCIAL

## 2022-08-08 DIAGNOSIS — E11.65 UNCONTROLLED TYPE 2 DIABETES MELLITUS WITH HYPERGLYCEMIA (HCC): Primary | ICD-10-CM

## 2022-08-08 PROCEDURE — G0109 DIAB MANAGE TRN IND/GROUP: HCPCS

## 2022-08-10 NOTE — PROGRESS NOTES
[facility] Program for Diabetes Health  Diabetes Self-Management Education & Support Program  Encounter Note    SUMMARY  Diabetes self-care management training was completed related to reducing risks. he participant will return on August 15 to continue DSMES related to healthy eating and monitoring. The participant did identify SMART Goal(s) and will practice knowledge and skills related to reducing risks to improve diabetes self-management. EVALUATION:  Participant was engaged in learning and asked appropriate questions. Shared that he see's a podiatrist regulary and that he has retinopathy from diabetes and is currently receiving injections in both eyes. He also states that he has little to no appetite and is concerned about that. RECOMMENDATIONS:  Talk with provider about appetite concerns. Continue seeing specialists as scheduled. Fill out personal care record. TOPICS DISCUSSED TODAY:  WHAT IS DIABETES? Minutes: Nirmal Hernandez 58? 61      Next provider visit is scheduled for August 15, [id number]       SMART GOAL(S)  Create a personal care record to keep track of my self-care routine. ACHIEVEMENT OF GOAL(S) : 0-24%         DATE DSMES TOPIC EVALUATION     8/10/2022 WHAT IS DIABETES? Role of the normal pancreas in energy balance and blood glucose control   The defect seen in diabetes   Signs & symptoms of diabetes   Diagnosis of diabetes   Types of diabetes   Blood glucose targets in non-pregnant & non-pregnant adults       The participant knows  Their type of diabetes: Yes  The basic physiologic defect: Yes  Blood glucose targets: Yes     DATE DSMES TOPIC EVALUATION     8/10/2022 HOW DO I STAY HEALTHY?    Prevention   Vaccinations   Preconception care (if applicable)  Examinations   Eye    Foot   Diabetic complications' prevention   Dental health   Heart health   Kidney Health   Nerve health   Sleep health      The participant has a personal diabetes care record to keep abreast of diabetes health Yes                  Qing Caldwell RN on 8/10/2022 at 1:24 PM    I have personally reviewed the health record, including provider notes, laboratory data and current medications before making these care and education recommendations. The time spent in this effort is included in the total time.   Total minutes: 120

## 2022-08-15 ENCOUNTER — CLINICAL SUPPORT (OUTPATIENT)
Dept: DIABETES SERVICES | Age: 57
End: 2022-08-15
Payer: COMMERCIAL

## 2022-08-15 DIAGNOSIS — E11.65 UNCONTROLLED TYPE 2 DIABETES MELLITUS WITH HYPERGLYCEMIA (HCC): Primary | ICD-10-CM

## 2022-08-15 PROCEDURE — G0109 DIAB MANAGE TRN IND/GROUP: HCPCS | Performed by: DIETITIAN, REGISTERED

## 2022-08-19 NOTE — PROGRESS NOTES
[employer] [facility] for Diabetes Health  Diabetes Self-Management Education & Support Program  Encounter Note    SUMMARY  Diabetes self-care management training was completed related to healthy eating. he participant will return on August 22 to continue DSMES related to monitoring, taking medications, and physical activity. The participant did identify SMART Goal(s) and will practice knowledge and skills related to reducing risks and healthy eating to improve diabetes self-management. EVALUATION:  Mr. Mignon Almaraz demonstrated good understanding of CHO counting AEB designing a 60 g CHO breakfast meal using label reading, food models and the healthy plate platform. RECOMMENDATIONS:  Use Healthy plate platform and design balanced 60 CHO meals for B/L/D  Monitor BG before meals and 2H post prandial to assess for target levels     TOPICS DISCUSSED TODAY:  WHAT CAN I EAT? 60 x2 units  Total 120 minutes      Next provider visit is scheduled for 9/2/2022 Tacos Sams NP       SMART GOAL(S)  Practice building a balanced meal for B/L/D at least 3 times over the next week. ACHIEVEMENT OF GOAL(S) : 0-24%         DATE DSMES TOPIC EVALUATION     8/19/2022 WHAT CAN I EAT? General principles   Determining a healthy weight   Nutritional terms & tools   Healthy Plate method   Carbohydrate Counting   Reading food labels   Free apps   Pregnancy recommendations      The participant   Uses Healthy Plate principles in constructing meals: Yes  Reads food labels in choosing acceptable foods: Yes    The participant needs to address designing balanced 60 g CHO meals for B/L/D. Shelby Albert RD on 8/19/2022 at 9:45 AM    I have personally reviewed the health record, including provider notes, laboratory data and current medications before making these care and education recommendations. The time spent in this effort is included in the total time.   Total minutes: 120

## 2022-08-20 LAB
25(OH)D3+25(OH)D2 SERPL-MCNC: 39.4 NG/ML (ref 30–100)
ALBUMIN SERPL-MCNC: 4.6 G/DL (ref 3.8–4.9)
ALBUMIN/CREAT UR: 330 MG/G CREAT (ref 0–29)
ALBUMIN/GLOB SERPL: 1.6 {RATIO} (ref 1.2–2.2)
ALP SERPL-CCNC: 136 IU/L (ref 44–121)
ALT SERPL-CCNC: 28 IU/L (ref 0–44)
AST SERPL-CCNC: 17 IU/L (ref 0–40)
BASOPHILS # BLD AUTO: 0.1 X10E3/UL (ref 0–0.2)
BASOPHILS NFR BLD AUTO: 1 %
BILIRUB SERPL-MCNC: 0.5 MG/DL (ref 0–1.2)
BUN SERPL-MCNC: 33 MG/DL (ref 6–24)
BUN/CREAT SERPL: 19 (ref 9–20)
CALCIUM SERPL-MCNC: 10 MG/DL (ref 8.7–10.2)
CHLORIDE SERPL-SCNC: 102 MMOL/L (ref 96–106)
CHOLEST SERPL-MCNC: 92 MG/DL (ref 100–199)
CO2 SERPL-SCNC: 23 MMOL/L (ref 20–29)
CREAT SERPL-MCNC: 1.74 MG/DL (ref 0.76–1.27)
CREAT UR-MCNC: 73.9 MG/DL
EGFR: 45 ML/MIN/1.73
EOSINOPHIL # BLD AUTO: 0.4 X10E3/UL (ref 0–0.4)
EOSINOPHIL NFR BLD AUTO: 4 %
ERYTHROCYTE [DISTWIDTH] IN BLOOD BY AUTOMATED COUNT: 13.7 % (ref 11.6–15.4)
EST. AVERAGE GLUCOSE BLD GHB EST-MCNC: 232 MG/DL
FOLATE SERPL-MCNC: 6.6 NG/ML
GLOBULIN SER CALC-MCNC: 2.9 G/DL (ref 1.5–4.5)
GLUCOSE SERPL-MCNC: 159 MG/DL (ref 65–99)
HBA1C MFR BLD: 9.7 % (ref 4.8–5.6)
HCT VFR BLD AUTO: 42.8 % (ref 37.5–51)
HCV AB S/CO SERPL IA: <0.1 S/CO RATIO (ref 0–0.9)
HCV AB SERPL QL IA: NORMAL
HDLC SERPL-MCNC: 36 MG/DL
HGB BLD-MCNC: 14.3 G/DL (ref 13–17.7)
IMM GRANULOCYTES # BLD AUTO: 0 X10E3/UL (ref 0–0.1)
IMM GRANULOCYTES NFR BLD AUTO: 0 %
LDLC SERPL CALC-MCNC: 21 MG/DL (ref 0–99)
LYMPHOCYTES # BLD AUTO: 2.3 X10E3/UL (ref 0.7–3.1)
LYMPHOCYTES NFR BLD AUTO: 26 %
MCH RBC QN AUTO: 30.6 PG (ref 26.6–33)
MCHC RBC AUTO-ENTMCNC: 33.4 G/DL (ref 31.5–35.7)
MCV RBC AUTO: 92 FL (ref 79–97)
MICROALBUMIN UR-MCNC: 244 UG/ML
MONOCYTES # BLD AUTO: 0.5 X10E3/UL (ref 0.1–0.9)
MONOCYTES NFR BLD AUTO: 6 %
NEUTROPHILS # BLD AUTO: 5.7 X10E3/UL (ref 1.4–7)
NEUTROPHILS NFR BLD AUTO: 63 %
PLATELET # BLD AUTO: 216 X10E3/UL (ref 150–450)
POTASSIUM SERPL-SCNC: 4.3 MMOL/L (ref 3.5–5.2)
PROT SERPL-MCNC: 7.5 G/DL (ref 6–8.5)
RBC # BLD AUTO: 4.67 X10E6/UL (ref 4.14–5.8)
RETICS/RBC NFR AUTO: 1.1 % (ref 0.6–2.6)
SODIUM SERPL-SCNC: 141 MMOL/L (ref 134–144)
TRIGL SERPL-MCNC: 233 MG/DL (ref 0–149)
URATE SERPL-MCNC: 4.9 MG/DL (ref 3.8–8.4)
VIT B12 SERPL-MCNC: 571 PG/ML (ref 232–1245)
VLDLC SERPL CALC-MCNC: 35 MG/DL (ref 5–40)
WBC # BLD AUTO: 9 X10E3/UL (ref 3.4–10.8)

## 2022-09-02 ENCOUNTER — OFFICE VISIT (OUTPATIENT)
Dept: FAMILY MEDICINE CLINIC | Age: 57
End: 2022-09-02
Payer: COMMERCIAL

## 2022-09-02 ENCOUNTER — TELEPHONE (OUTPATIENT)
Dept: FAMILY MEDICINE CLINIC | Age: 57
End: 2022-09-02

## 2022-09-02 VITALS
DIASTOLIC BLOOD PRESSURE: 74 MMHG | SYSTOLIC BLOOD PRESSURE: 142 MMHG | HEART RATE: 82 BPM | TEMPERATURE: 97.9 F | BODY MASS INDEX: 31.14 KG/M2 | WEIGHT: 235 LBS | HEIGHT: 73 IN | RESPIRATION RATE: 20 BRPM | OXYGEN SATURATION: 99 %

## 2022-09-02 DIAGNOSIS — Z12.11 SCREENING FOR MALIGNANT NEOPLASM OF COLON: ICD-10-CM

## 2022-09-02 DIAGNOSIS — E11.49 TYPE II OR UNSPECIFIED TYPE DIABETES MELLITUS WITH NEUROLOGICAL MANIFESTATIONS, UNCONTROLLED(250.62) (HCC): Primary | ICD-10-CM

## 2022-09-02 DIAGNOSIS — R11.0 NAUSEA: ICD-10-CM

## 2022-09-02 DIAGNOSIS — G47.09 OTHER INSOMNIA: ICD-10-CM

## 2022-09-02 DIAGNOSIS — R76.8 POSITIVE ANA (ANTINUCLEAR ANTIBODY): ICD-10-CM

## 2022-09-02 DIAGNOSIS — R25.2 LEG CRAMPS: ICD-10-CM

## 2022-09-02 PROBLEM — I10 ESSENTIAL (PRIMARY) HYPERTENSION: Status: ACTIVE | Noted: 2022-09-02

## 2022-09-02 PROCEDURE — 99214 OFFICE O/P EST MOD 30 MIN: CPT | Performed by: NURSE PRACTITIONER

## 2022-09-02 PROCEDURE — 3046F HEMOGLOBIN A1C LEVEL >9.0%: CPT | Performed by: NURSE PRACTITIONER

## 2022-09-02 RX ORDER — TADALAFIL 20 MG/1
20 TABLET ORAL AS NEEDED
COMMUNITY

## 2022-09-02 RX ORDER — HYDROXYZINE PAMOATE 50 MG/1
CAPSULE ORAL
Qty: 180 CAPSULE | Refills: 1 | Status: SHIPPED | OUTPATIENT
Start: 2022-09-02

## 2022-09-02 RX ORDER — ISOPROPYL ALCOHOL 70 ML/100ML
SWAB TOPICAL
Qty: 300 PAD | Refills: 3 | Status: SHIPPED | OUTPATIENT
Start: 2022-09-02

## 2022-09-02 RX ORDER — PEN NEEDLE, DIABETIC 31 GX3/16"
NEEDLE, DISPOSABLE MISCELLANEOUS
Qty: 90 PEN NEEDLE | Refills: 1 | Status: SHIPPED | OUTPATIENT
Start: 2022-09-02

## 2022-09-02 RX ORDER — ONDANSETRON 8 MG/1
TABLET, ORALLY DISINTEGRATING ORAL
COMMUNITY
End: 2022-09-02 | Stop reason: SDUPTHER

## 2022-09-02 RX ORDER — HYDROCHLOROTHIAZIDE 25 MG/1
TABLET ORAL
COMMUNITY

## 2022-09-02 RX ORDER — ONDANSETRON 8 MG/1
TABLET, ORALLY DISINTEGRATING ORAL
Qty: 30 TABLET | Refills: 1 | Status: SHIPPED | OUTPATIENT
Start: 2022-09-02 | End: 2022-09-12 | Stop reason: SDUPTHER

## 2022-09-02 RX ORDER — SEMAGLUTIDE 1.34 MG/ML
INJECTION, SOLUTION SUBCUTANEOUS
Qty: 3 ML | Refills: 0 | Status: SHIPPED | OUTPATIENT
Start: 2022-09-02

## 2022-09-02 NOTE — PROGRESS NOTES
1. \"Have you been to the ER, urgent care clinic since your last visit? Hospitalized since your last visit? No     2. \"Have you seen or consulted any other health care providers outside of the [address] [place] [address] since your last visit? \"   No   3. For patients aged 39-70: Has the patient had a colonoscopy / FIT/ Cologuard? 2012 needs one    If the patient is female:    4. For patients aged 41-77: Has the patient had a mammogram within the past 2 years? Na       5. For patients aged 21-65: Has the patient had a pap smear?  Barbara Nixon is a 64 y.o. male is coming in for with the following:     Chief Complaint   Patient presents with    Labs    Diabetes    Hypertension          With the following vitals:    Visit Vitals  BP (!) 142/74 (BP 1 Location: Left upper arm, BP Patient Position: Sitting, BP Cuff Size: Large adult)   Pulse 82   Temp 97.9 °F (36.6 °C) (Temporal)   Resp 20   Ht 6' 1\" (1.854 m)   Wt 235 lb (106.6 kg)   SpO2 99%   BMI 31.00 kg/m²

## 2022-09-02 NOTE — TELEPHONE ENCOUNTER
[facility] sent over a fax for instructions on what the patient should do with Ondansetron 8 mg tabs order is in

## 2022-09-02 NOTE — PROGRESS NOTES
Chief Complaint   Patient presents with    Labs    Diabetes    Hypertension     Visit Vitals  BP (!) 142/74 (BP 1 Location: Left upper arm, BP Patient Position: Sitting, BP Cuff Size: Large adult)   Pulse 82   Temp 97.9 °F (36.6 °C) (Temporal)   Resp 20   Ht 6' 1\" (1.854 m)   Wt 235 lb (106.6 kg)   SpO2 99%   BMI 31.00 kg/m²     Subjective  Sparkle Villalobos is a 64 y.o. male. HPI:  Patient presented for follow-up. All recent lab results discussed w/ pt today. Medical history significant for uncontrolled type 2 diabetes, severe peripheral neuropathy, severe nonproliferative diabetic retinopathy, hypertension, insomnia, ED, carpal tunnel syndrome of both hands, hypercholesterolemia, peripheral edema,  positive TERESA test, vitamin D deficiency, gout and depression and anxiety. T2DM- Current med regime is Jardiance 10 mg daily, glimepiride 2 mg daily in am, and metformin 1000 units 2 x day. Uncontrolled T2DM w/ severe neuropathy- Last A1c was 9.7 on 8/18/2922. Vs 9.0 on 2/16/2022 vs 8.2 on 8/18/2021 vs 9. On 4/8/2021. Pt has not been in control in years. I checked his C-Peptide level  because his A1c was not improving. I came back elevated to pt does not have T1DM to account for his continuing poor control of diabetes. He was referred to Dr. Godwin Carney but her appts were very far out so he did not make appt. He is not interested in going out of local area for a new endo. Current med regime is Jardiance 10 mg daily, glimepiride 2 mg in am, and metformin 1000 mg bid before meals. Pt would benefit from use of CGM such as [employer] system which would make glucose checks a lot easier for him and better compliance. Hypertension- B/P in office elevated at 142/74. He stated that when he checks at home it is running [id number] @ home. Current med regime is amlodipine 10 mg daily, chlorthalidone 25 mg daily. Contributing to the B/P elevations no doubt is that pt is very upset about the status of his SSDI claim.  The process has been going on for months and he can't get a straight answer from anyone. He is very angry and can't get his mind off of it and impacting on his sleep. He wakes up at 0230 and can't get back to sleep. He is on long term disability from his job and has to keep submitting paperwork to justify continued time off. I advised him to make an appt w/ a SS rep so that he can have someone track down his case and checks the status. He is under the care of neurologist Dr. Ivania Hogan for treatment of his severe neuropathy. Currently he is ordered both gabapentin and lyrica by him. I advised pt to discuss w/  as generally a pt is on one or the other. I am unclear on rationale. Pt is also ordered duloxetine at 60 mg daily to help w/ neuropathy and pt's depression. He is not eating because he is so miserable because he can't sleep.      Patient Active Problem List   Diagnosis Code    ED (erectile dysfunction) N52.9    Insomnia G47.00    Gout M10.9    Diabetes mellitus (HCC) E11.9    Diabetes ([facility]) E11.9    Loss of perception for smell R43.0    Onychomycosis B35.1    Diabetic polyneuropathy associated with diabetes mellitus due to underlying condition ([facility] E08.42    Vitamin D deficiency E55.9    Lesion of ulnar nerve G56.20    Stiffness of other specified joint, not elsewhere classified M25.69    Carpal tunnel syndrome of left wrist G56.02    Carpal tunnel syndrome of right wrist G56.01    Localized edema R60.0    Muscle weakness (generalized) M62.81    Anesthesia of skin R20.0    Other disturbances of skin sensation R20.8    Other general symptoms and signs R68.89    Pain in left hand M79.642    Paresthesia of skin R20.2    Uncontrolled type 2 diabetes mellitus with hyperglycemia ([facility]) E11.65    Scar conditions and fibrosis of skin L90.5    Entrapment of left ulnar nerve at elbow G56.22    Ulnar nerve entrapment at right elbow G56.21    Weakness R53.1    Type 2 diabetes mellitus ([facility] E11.9 Hypercholesterolemia E78.00    Positive TERESA (antinuclear antibody) R76.8    Severe nonproliferative diabetic retinopathy associated with type 2 diabetes mellitus ([facility] Z44.7414    Disordered sleep G47.9    Essential (primary) hypertension I10     Past Medical History:   Diagnosis Date    Diabetes ([facility]     Gout     Hypertension      Past Surgical History:   Procedure Laterality Date    HX CARPAL TUNNEL RELEASE Left 09/21/2020    Ulnar release    HX HEENT      HX ORTHOPAEDIC      HX ORTHOPAEDIC Left 09/2020    Carpal tunnel, elbow and nerve transplant    [place] ABDOMEN SURGERY PROC UNLISTED       Current Outpatient Medications   Medication Instructions    alcohol swabs (Alcohol Pads) padm Use to cleanse skin prior to injecting Ozempic    amLODIPine (NORVASC) 10 mg, Oral, DAILY    Blood-Glucose Meter (Accu-Chek Guide Me Glucose Mtr) misc Use to check finger stick blood glucose TID  DX CODE E11.9    chlorthalidone (HYGROTON) 25 mg, Oral, DAILY    cyclobenzaprine (FLEXERIL) 10 mg, Oral, 2 TIMES DAILY    DULoxetine (CYMBALTA) 60 mg, Oral, DAILY    empagliflozin (JARDIANCE) 10 mg, Oral, DAILY    gabapentin (NEURONTIN) 1,200 mg, Oral, 3 TIMES DAILY, For severe peripheral neuropathy. glimepiride (AMARYL) 2 mg, Oral, DAILY    glucose blood VI test strips (Accu-Chek Guide test strips) strip USE TO CHECK FINGER STICK BLOOD GLUCOSE 3 TIMES A DAY DX CODE E11.9    glucose blood VI test strips (FREESTYLE LITE STRIPS) strip Bid monitoring dx: 250.00    glucose blood VI test strips (FreeStyle Lite Strips) strip Use  To check sugars 3 times daily  DX CODE E11.65    hydroCHLOROthiazide (HYDRODIURIL) 25 mg tablet hydrochlorothiazide 25 mg tablet    hydrOXYzine pamoate (VISTARIL) 50 mg capsule Take 1 to 2 capsules by mouth 30-60 min prior to bedtime for insomnia.     Insulin Needles, Disposable, 32 gauge x 5/32\" ndle Use 1 needle weekly to inject Ozempic    lancets (Accu-Chek Multiclix Lancet) misc Use to check finger stick blood glucose TID  DX CODE E11.9    Lancing Device with Lancets (Accu-Chek Multiclix Lancet) kit Use to check finger stick blood glucose TID  DX CODE E11.9    metFORMIN ER (GLUCOPHAGE XR) 500 mg tablet Take 2 tabs by mouth before breakfast and 2 tabs before evening meal daily. ondansetron (ZOFRAN ODT) 8 mg disintegrating tablet Dissolve one tab in mouth every 8 hours as needed for nausea. rosuvastatin (CRESTOR) 20 mg, Oral, EVERY BEDTIME    semaglutide (Ozempic) 0.25 mg or 0.5 mg/dose (2 mg/1.5 ml) subq pen Inject 0.25 mg under the skin once weekly for 4 weeks, then increase to 0.5 mg once weekly. May increase to 1 mg once weekly after 4 weeks on the 0.5 mg/week dose. tadalafiL (CIALIS) 20 mg, Oral, AS NEEDED, Take 60 minutes prior to anticipated sexual activity      Allergies   Allergen Reactions    Teicoplanin Rash     Social History     Tobacco Use    Smoking status: Never    Smokeless tobacco: Never   Vaping Use    Vaping Use: Never used   Substance Use Topics    Alcohol use: No    Drug use: No     Family History   Problem Relation Age of Onset    Hypertension Mother     Diabetes Father     Stroke Father     Heart Disease Father     Heart Attack Father      Review of Systems   Constitutional:  Negative for chills, fever and malaise/fatigue. HENT:  Negative for congestion, ear pain and sore throat. Eyes:  Negative for blurred vision. Respiratory:  Negative for cough, shortness of breath and wheezing. Cardiovascular:  Negative for chest pain, palpitations and leg swelling. Gastrointestinal:  Positive for nausea. Negative for abdominal pain, constipation, diarrhea, heartburn and vomiting. Genitourinary:  Negative for dysuria. Musculoskeletal:  Positive for myalgias. Negative for back pain, falls, joint pain and neck pain. Spells of severe cramping in R leg. Neurological:  Positive for tingling and sensory change. Negative for dizziness, weakness and headaches.    Psychiatric/Behavioral: Positive for depression. The patient is nervous/anxious and has insomnia. Objective  Physical Exam  Vitals reviewed. Constitutional:       General: He is not in acute distress. Appearance: Normal appearance. He is obese. HENT:      Head: Normocephalic. Right Ear: External ear normal.      Left Ear: External ear normal.      Nose: Nose normal.   Eyes:      Conjunctiva/sclera: Conjunctivae normal.   Neck:      Vascular: No carotid bruit. Cardiovascular:      Rate and Rhythm: Normal rate and regular rhythm. Heart sounds: Normal heart sounds. No murmur heard. Pulmonary:      Effort: Pulmonary effort is normal. No respiratory distress. Breath sounds: Normal breath sounds. Musculoskeletal:         General: No swelling or tenderness. Normal range of motion. Cervical back: Neck supple. Right lower leg: No edema. Left lower leg: No edema. Skin:     General: Skin is warm and dry. Coloration: Skin is not jaundiced. Findings: No lesion or rash. Neurological:      Mental Status: He is alert and oriented to person, place, and time. Sensory: Sensory deficit present. Motor: No weakness. Gait: Gait normal.      Comments: Pt unable to detect monofilament on any parts of both feet or legs up to his knees. Psychiatric:         Behavior: Behavior normal.         Thought Content: Thought content normal.         Judgment: Judgment normal.      Comments: Depressed, frustrated, angry       Assessment & Plan      ICD-10-CM ICD-9-CM    1. Type II or unspecified type diabetes mellitus with neurological manifestations, uncontrolled(250.62) ([facility])  E11.49 250.62 semaglutide (Ozempic) 0.25 mg or 0.5 mg/dose (2 mg/1.5 ml) subq pen      Insulin Needles, Disposable, 32 gauge x 5/32\" ndle      alcohol swabs (Alcohol Pads) pad      AMB SUPPLY ORDER      REFERRAL TO ENDOCRINOLOGY      2. Other insomnia  G47.09 780.52 hydrOXYzine pamoate (VISTARIL) 50 mg capsule      3. Nausea  R11.0 787.02 DISCONTINUED: ondansetron (ZOFRAN ODT) 8 mg disintegrating tablet      4. Leg cramps  R25.2 729.82 cyclobenzaprine (FLEXERIL) 10 mg tablet      5. Screening for malignant neoplasm of colon  Z12.11 V76.51 REFERRAL TO GASTROENTEROLOGY      6. Positive TERESA (antinuclear antibody)  R76.8 795.79 REFERRAL TO RHEUMATOLOGY          1. Type II or unspecified type diabetes mellitus with neurological manifestations, uncontrolled(250.62) ([facility] [address] [address]  Discussed adding on Ozempic not only for cardiac and kidney protection but also may lose weight which would be helpful for his diabetic control. Pt was agreeable. Will work up to higher doses slowly. Hopefully his insurance will cover the [email or web address] 2 sensors. He also needs a referral to new endocrinologist as his last one left the area. - semaglutide (Ozempic) 0.25 mg or 0.5 mg/dose (2 mg/1.5 ml) subq pen; Inject 0.25 mg under the skin once weekly for 4 weeks, then increase to 0.5 mg once weekly. May increase to 1 mg once weekly after 4 weeks on the 0.5 mg/week dose. Indications: type 2 diabetes mellitus  Dispense: 3 mL; Refill: 0  - Insulin Needles, Disposable, 32 gauge x 5/32\" ndle; Use 1 needle weekly to inject Ozempic  Dispense: 90 Pen Needle; Refill: 1  - alcohol swabs (Alcohol Pads) padm; Use to cleanse skin prior to injecting Ozempic  Dispense: 300 Pad; Refill: 3  - AMB SUPPLY ORDER    2. Other insomnia  Do not want to order any controlled substances. Discussed trying Vistaril and pt was receptive. He might benefit from angry management if can find a provider in a timely manner. Discussed relaxation techniques,warm baths at bedtime, relaxing sounds or music. Pt stated he has tried all and not helpful. He does not like to sit in bath water because he considers it dirty water. - hydrOXYzine pamoate (VISTARIL) 50 mg capsule; Take 1 to 2 capsules by mouth 30-60 min prior to bedtime for insomnia. Dispense: 180 Capsule; Refill: 1    3.  Nausea  Use Zofran as needed which has been effective for him in pas.t      I have discussed the diagnosis with the patient and the intended plan as seen in the above orders. Pt/caretaker has expressed understanding. Questions were answered concerning future plans. I have discussed medication side effects and warnings as indicated with the patient as well.     Phillip Roach, SORAYA

## 2022-09-12 RX ORDER — ONDANSETRON 8 MG/1
TABLET, ORALLY DISINTEGRATING ORAL
Qty: 30 TABLET | Refills: 1 | Status: SHIPPED | OUTPATIENT
Start: 2022-09-12 | End: 2022-09-30 | Stop reason: SDUPTHER

## 2022-09-12 RX ORDER — CYCLOBENZAPRINE HCL 10 MG
10 TABLET ORAL 2 TIMES DAILY
Qty: 180 TABLET | Refills: 1 | Status: SHIPPED | OUTPATIENT
Start: 2022-09-12

## 2022-09-20 ENCOUNTER — OFFICE VISIT (OUTPATIENT)
Dept: NEUROLOGY | Age: 57
End: 2022-09-20
Payer: COMMERCIAL

## 2022-09-20 VITALS
HEART RATE: 80 BPM | RESPIRATION RATE: 14 BRPM | SYSTOLIC BLOOD PRESSURE: 128 MMHG | DIASTOLIC BLOOD PRESSURE: 78 MMHG | OXYGEN SATURATION: 98 %

## 2022-09-20 DIAGNOSIS — E11.40 CHRONIC PAINFUL DIABETIC NEUROPATHY (HCC): Primary | ICD-10-CM

## 2022-09-20 PROCEDURE — 99214 OFFICE O/P EST MOD 30 MIN: CPT | Performed by: PSYCHIATRY & NEUROLOGY

## 2022-09-20 PROCEDURE — 3046F HEMOGLOBIN A1C LEVEL >9.0%: CPT | Performed by: PSYCHIATRY & NEUROLOGY

## 2022-09-20 RX ORDER — GABAPENTIN 800 MG/1
1200 TABLET ORAL 3 TIMES DAILY
Qty: 405 TABLET | Refills: 0 | Status: SHIPPED | OUTPATIENT
Start: 2022-09-20 | End: 2022-12-19

## 2022-09-20 RX ORDER — PREGABALIN 100 MG/1
100 CAPSULE ORAL 3 TIMES DAILY
Qty: 270 CAPSULE | Refills: 0 | Status: SHIPPED | OUTPATIENT
Start: 2022-09-20 | End: 2022-12-19

## 2022-09-20 NOTE — PROGRESS NOTES
Chief Complaint   Patient presents with    Follow-up    Peripheral Neuropathy     Patient states nothing has really changed with his neuropathy, he did cut out all soda's and juices and is drinking only water, he is having severe calf cramps at night. He states is PCP added new sleep medication.

## 2022-09-20 NOTE — PROGRESS NOTES
Wilma Baer (1965) is a 62 y.o. male, established patient, here for evaluation of the following     Chief complaint(s):   Chief Complaint   Patient presents with    Follow-up    Peripheral Neuropathy     Patient states nothing has really changed with his neuropathy, he did cut out all soda's and juices and is drinking only water, he is having severe calf cramps at night. He states is PCP added new sleep medication. SUBJECTIVE/ OBJECTIVE:    HPI: 62 y.o. male      Last visit, referred pt to see an Pain Management to discuss spinal cord stimulator trial for his severe, intractable, painful diabetic neuropathy. Pt received a message that the clinic was not taking any new patients. He requests referral to another clinic as his pain remains severe. Remains on Gabapentin 600 mg, 2 tabs TID. Increased his Lyrica from 75 mgh TID to 100 mg TID at last visit. Remains on Cymbalta 60 mg/ day (Rx'd by PCP). Was alternating Amitriptyline 150 mg QHS and Doxepin 50 mg QHS (both Rx'd by PCP) to help sleep, as he Trazodone didn't help. It looks like both of those meds are off his med list and replaced with Vistaril (Hydroxyzine) for sleep purposes. Says most recent lab work with PCP showed reduced renal function, whereas in the past it was normal.  I reviewed his recent CMP (8-): Cr was 1.74, GFR was 45. Pt says he/ PCP discussed that he may have been dehydrated that day. They agreed he would cut out all soda's (which he did 2 weeks ago), start drinking water only, and have lab rechecked in Oct 2022. Reviewed . No concerning findings. Patient is only filling prescriptions of gabapentin and Lyrica, prescribed for me.   No other controlled substances.      ========================================    Brief Hx:     Initial Visit: 9-14-21 (see for full details)    EMG (11-2-2021, bilateral lower extremities): 1) Severe sensorimotor polyneuropathy with absent tibial F waves and tibial H reflexes (due to absent right tibial motor response and near absent left tibial motor response). 2) Chronic active denervation in distal lower extremity muscles consistent with severity of underlying peripheral neuropathy. Likely due to history of longstanding diabetes mellitus. If any clinical concern of a demyelinating neuropathy, recommend checking bilateral upper extremity EMG nerve conduction studies including F waves). 3) There is active denervation in the left gluteus yung (proximal S1 muscle), compatible with a chronic, active, left S1 lumbar radiculopathy. 4) No electrodiagnostic findings to suggest right lumbar radiculopathy. Allergies   Allergen Reactions    Teicoplanin Rash         Current Outpatient Medications:     gabapentin (NEURONTIN) 800 mg tablet, Take 1.5 Tablets by mouth three (3) times daily for 90 days. Max Daily Amount: 3,600 mg. For severe peripheral neuropathy. , Disp: 405 Tablet, Rfl: 0    pregabalin (LYRICA) 100 mg capsule, Take 1 Capsule by mouth three (3) times daily for 90 days. Max Daily Amount: 300 mg., Disp: 270 Capsule, Rfl: 0    ondansetron (ZOFRAN ODT) 8 mg disintegrating tablet, Dissolve one tab in mouth every 8 hours as needed for nausea., Disp: 30 Tablet, Rfl: 1    cyclobenzaprine (FLEXERIL) 10 mg tablet, Take 1 Tablet by mouth two (2) times a day., Disp: 180 Tablet, Rfl: 1    tadalafiL (CIALIS) 20 mg tablet, Take 20 mg by mouth as needed. Take 60 minutes prior to anticipated sexual activity, Disp: , Rfl:     hydroCHLOROthiazide (HYDRODIURIL) 25 mg tablet, hydrochlorothiazide 25 mg tablet, Disp: , Rfl:     hydrOXYzine pamoate (VISTARIL) 50 mg capsule, Take 1 to 2 capsules by mouth 30-60 min prior to bedtime for insomnia., Disp: 180 Capsule, Rfl: 1    semaglutide (Ozempic) 0.25 mg or 0.5 mg/dose (2 mg/1.5 ml) subq pen, Inject 0.25 mg under the skin once weekly for 4 weeks, then increase to 0.5 mg once weekly.  May increase to 1 mg once weekly after 4 weeks on the 0.5 mg/week dose. Indications: type 2 diabetes mellitus, Disp: 3 mL, Rfl: 0    Insulin Needles, Disposable, 32 gauge x 5/32\" ndle, Use 1 needle weekly to inject Ozempic, Disp: 90 Pen Needle, Rfl: 1    alcohol swabs (Alcohol Pads) padm, Use to cleanse skin prior to injecting Ozempic, Disp: 300 Pad, Rfl: 3    empagliflozin (Jardiance) 10 mg tablet, Take 1 Tablet by mouth in the morning., Disp: 90 Tablet, Rfl: 1    glimepiride (AMARYL) 2 mg tablet, Take 1 Tablet by mouth in the morning., Disp: 90 Tablet, Rfl: 1    rosuvastatin (CRESTOR) 20 mg tablet, Take 1 Tablet by mouth nightly., Disp: 90 Tablet, Rfl: 1    DULoxetine (CYMBALTA) 60 mg capsule, Take 1 Capsule by mouth daily. , Disp: 90 Capsule, Rfl: 1    metFORMIN ER (GLUCOPHAGE XR) 500 mg tablet, Take 2 tabs by mouth before breakfast and 2 tabs before evening meal daily. Indications: type 2 diabetes mellitus, Disp: 360 Tablet, Rfl: 1    chlorthalidone (HYGROTON) 25 mg tablet, Take 1 Tablet by mouth daily. Indications: high blood pressure, Disp: 90 Tablet, Rfl: 1    amLODIPine (NORVASC) 10 mg tablet, Take 1 Tablet by mouth daily. , Disp: 90 Tablet, Rfl: 1    glucose blood VI test strips (Accu-Chek Guide test strips) strip, USE TO CHECK FINGER STICK BLOOD GLUCOSE 3 TIMES A DAY DX CODE E11.9, Disp: 300 Strip, Rfl: 3    Blood-Glucose Meter (Accu-Chek Guide Me Glucose Mtr) misc, Use to check finger stick blood glucose TID  DX CODE E11.9, Disp: 1 Each, Rfl: 0    Lancing Device with Lancets (Accu-Chek Multiclix Lancet) kit, Use to check finger stick blood glucose TID  DX CODE E11.9, Disp: 1 Kit, Rfl: 0    lancets (Accu-Chek Multiclix Lancet) misc, Use to check finger stick blood glucose TID  DX CODE E11.9, Disp: 300 Each, Rfl: 3    glucose blood VI test strips (FreeStyle Lite Strips) strip, Use  To check sugars 3 times daily  DX CODE E11.65, Disp: 300 Strip, Rfl: 5    glucose blood VI test strips (FREESTYLE LITE STRIPS) strip, Bid monitoring dx: 250.00, Disp: 1 Package, Rfl: 11     has a past medical history of Diabetes ([address] Gout, and Hypertension. has a past surgical history that includes hx heent; pr abdomen surgery proc unlisted; hx carpal tunnel release (Left, 09/21/2020); hx orthopaedic; and hx orthopaedic (Left, 09/2020). Physical Exam:    Vitals:    09/20/22 1129   BP: 128/78   Pulse: 80   Resp: 14   SpO2: 98%      No exam performed today    Entirety of visit spent discussing symptoms, meds, management    ========================================    ASSESSMENT/ PLAN:       ICD-10-CM ICD-9-CM    1. Chronic painful diabetic neuropathy (HCC)  E11.40 250.60 REFERRAL TO PAIN MANAGEMENT     357.2 gabapentin (NEURONTIN) 800 mg tablet      pregabalin (LYRICA) 100 mg capsule        Advised patient to increase his Cymbalta 60 mg capsules to 1 capsule twice a day (for diabetic neuropathy). Patient will continue on Lyrica 100 mg 1 capsule 3 times a day. Changed gabapentin from 600 mg tablet (2 tablets 3 times a day) to 800 mg tablet (1.5 tablets 3 times a day). Overall daily dose will remain the same (1200 mg 3 times a day). Discussed with patient that if his next metabolic panel shows persistently reduced renal function, then we will need to reduce the amount of gabapentin and Lyrica he is taking. Entered new referral to pain management to discuss spinal cord stimulator trial regarding his intractable diabetic neuropathy pain    Follow-up in 3 months      An electronic signature was used to authenticate this note.   -- Eric Stark MD

## 2022-09-20 NOTE — LETTER
9/20/2022    Patient: Pieter Lee   YOB: 1965   Date of Visit: 9/20/2022     Dolly Laws NP  [facility] [phone] [facility]  [address] [facility] [address]    Dear Dolly Laws NP,      Thank you for referring Mr. Frederic Szymanski to [address] [place] [address] for evaluation. My notes for this consultation are attached. If you have questions, please do not hesitate to call me. I look forward to following your patient along with you.       Sincerely,    Luis Felipe Rae MD

## 2022-09-28 ENCOUNTER — TELEPHONE (OUTPATIENT)
Dept: FAMILY MEDICINE CLINIC | Age: 57
End: 2022-09-28

## 2022-09-28 NOTE — TELEPHONE ENCOUNTER
Called patient and left VM that Jered Llamas is no longer with the practice and to call back to the office to reschedule appt if possible

## 2022-09-30 ENCOUNTER — OFFICE VISIT (OUTPATIENT)
Dept: FAMILY MEDICINE CLINIC | Age: 57
End: 2022-09-30
Payer: COMMERCIAL

## 2022-09-30 VITALS
BODY MASS INDEX: 31.14 KG/M2 | OXYGEN SATURATION: 98 % | SYSTOLIC BLOOD PRESSURE: 147 MMHG | DIASTOLIC BLOOD PRESSURE: 86 MMHG | TEMPERATURE: 97.1 F | HEART RATE: 65 BPM | RESPIRATION RATE: 18 BRPM | HEIGHT: 73 IN | WEIGHT: 235 LBS

## 2022-09-30 DIAGNOSIS — E78.00 HYPERCHOLESTEROLEMIA: ICD-10-CM

## 2022-09-30 DIAGNOSIS — E11.3499 SEVERE NONPROLIFERATIVE DIABETIC RETINOPATHY ASSOCIATED WITH TYPE 2 DIABETES MELLITUS, MACULAR EDEMA PRESENCE UNSPECIFIED, UNSPECIFIED LATERALITY (HCC): ICD-10-CM

## 2022-09-30 DIAGNOSIS — E08.42 DIABETIC POLYNEUROPATHY ASSOCIATED WITH DIABETES MELLITUS DUE TO UNDERLYING CONDITION (HCC): Primary | ICD-10-CM

## 2022-09-30 DIAGNOSIS — I10 PRIMARY HYPERTENSION: ICD-10-CM

## 2022-09-30 DIAGNOSIS — R11.0 NAUSEA: ICD-10-CM

## 2022-09-30 PROCEDURE — 99214 OFFICE O/P EST MOD 30 MIN: CPT | Performed by: NURSE PRACTITIONER

## 2022-09-30 PROCEDURE — 3046F HEMOGLOBIN A1C LEVEL >9.0%: CPT | Performed by: NURSE PRACTITIONER

## 2022-09-30 RX ORDER — VALSARTAN AND HYDROCHLOROTHIAZIDE 320; 25 MG/1; MG/1
1 TABLET, FILM COATED ORAL DAILY
Qty: 90 TABLET | Refills: 1 | Status: SHIPPED | OUTPATIENT
Start: 2022-09-30

## 2022-09-30 RX ORDER — CHLORTHALIDONE 25 MG/1
25 TABLET ORAL DAILY
Qty: 90 TABLET | Refills: 1 | Status: SHIPPED | OUTPATIENT
Start: 2022-09-30

## 2022-09-30 RX ORDER — GLIMEPIRIDE 2 MG/1
2 TABLET ORAL DAILY
Qty: 90 TABLET | Refills: 1 | Status: SHIPPED | OUTPATIENT
Start: 2022-09-30

## 2022-09-30 RX ORDER — ROSUVASTATIN CALCIUM 20 MG/1
20 TABLET, COATED ORAL
Qty: 90 TABLET | Refills: 1 | Status: SHIPPED | OUTPATIENT
Start: 2022-09-30

## 2022-09-30 RX ORDER — AMLODIPINE BESYLATE 10 MG/1
10 TABLET ORAL DAILY
Qty: 90 TABLET | Refills: 1 | Status: SHIPPED | OUTPATIENT
Start: 2022-09-30

## 2022-09-30 RX ORDER — VALSARTAN AND HYDROCHLOROTHIAZIDE 320; 25 MG/1; MG/1
1 TABLET, FILM COATED ORAL DAILY
COMMUNITY
End: 2022-09-30 | Stop reason: SDUPTHER

## 2022-09-30 RX ORDER — ONDANSETRON 8 MG/1
TABLET, ORALLY DISINTEGRATING ORAL
Qty: 30 TABLET | Refills: 1 | Status: SHIPPED | OUTPATIENT
Start: 2022-09-30

## 2022-09-30 NOTE — PROGRESS NOTES
Jann Fregoso (: 1965) is a 62 y.o. male, established patient, here for evaluation of the following chief complaint(s):  Diabetes (Pt stated that he needs his [employer] filled out Ins. ) and Medication Refill (Medication refill )         ASSESSMENT/PLAN:  Below is the assessment and plan developed based on review of pertinent history, physical exam, labs, studies, and medications. Based on today's findings we will proceed as follows:     1. Diabetic polyneuropathy associated with diabetes mellitus due to underlying condition (HCC)  -     glimepiride (AMARYL) 2 mg tablet; Take 1 Tablet by mouth daily. , Normal, Disp-90 Tablet, R-1  -     CBC WITH AUTOMATED DIFF  -     METABOLIC PANEL, COMPREHENSIVE  -     LIPID PANEL  -     THYROID CASCADE PROFILE  -     MICROALBUMIN, UR, RAND W/ MICROALB/CREAT RATIO  -     HEMOGLOBIN A1C WITH EAG  2. Nausea  -     ondansetron (ZOFRAN ODT) 8 mg disintegrating tablet; Dissolve one tab in mouth every 8 hours as needed for nausea., Normal, Disp-30 Tablet, R-1  3. Primary hypertension  -     valsartan-hydroCHLOROthiazide (DIOVAN-HCT) 320-25 mg per tablet; Take 1 Tablet by mouth daily. Indications: high blood pressure, Normal, Disp-90 Tablet, R-1  -     chlorthalidone (HYGROTON) 25 mg tablet; Take 1 Tablet by mouth daily. Indications: high blood pressure, Normal, Disp-90 Tablet, R-1  -     amLODIPine (NORVASC) 10 mg tablet; Take 1 Tablet by mouth daily. Indications: high blood pressure, Normal, Disp-90 Tablet, R-1  -     CBC WITH AUTOMATED DIFF  -     METABOLIC PANEL, COMPREHENSIVE  -     LIPID PANEL  -     THYROID CASCADE PROFILE  -     MICROALBUMIN, UR, RAND W/ MICROALB/CREAT RATIO  4. Hypercholesterolemia  -     rosuvastatin (CRESTOR) 20 mg tablet; Take 1 Tablet by mouth nightly., Normal, Disp-90 Tablet, R-1  -     CBC WITH AUTOMATED DIFF  -     METABOLIC PANEL, COMPREHENSIVE  -     LIPID PANEL  -     THYROID CASCADE PROFILE  5.  Severe nonproliferative diabetic retinopathy associated with type 2 diabetes mellitus, macular edema presence unspecified, unspecified laterality (HCC)  -     glimepiride (AMARYL) 2 mg tablet; Take 1 Tablet by mouth daily. , Normal, Disp-90 Tablet, R-1  -     CBC WITH AUTOMATED DIFF  -     METABOLIC PANEL, COMPREHENSIVE  -     LIPID PANEL  -     THYROID CASCADE PROFILE  -     MICROALBUMIN, UR, RAND W/ MICROALB/CREAT RATIO  -     HEMOGLOBIN A1C WITH EAG  In clinic EKG was unremarkable  Hypertension poorly controlled, no changes needed. Issues discussed include maintaining a diet, weight control and exercise, continued home blood pressure monitoring, maintaining medication compliance, medication side effects, long term hypertension complications, and refraining from tobacco use. Treatment plan: Continue current medications, maintaining low sodium diet, blood pressure goal discussed, labwork from today discussed and reviewed, future labwork ordered, A1C, lipids check, patient up to date on maintence and screening exams. No follow-ups on file. SUBJECTIVE/OBJECTIVE:  HPI  Hx of uncontrolled diabetes with that led to neuropathy with nerve damage confirmed emg 2020. Followed by carpal tunnel treatment. Takes gabapentin and lyrica. Still with limited left arm weakness, , also with right hand issues as well. Cold hands perception, no sensation of the left hand, decreased on the right hand. Foot check also with no sensation, checks feet everyday. Has episodical issues with sudden fall, at least once every 3 months, with no issues with apparent hypoglycemia prior to fall. Endo April 2023. Does check BP at home consistent with bp at clinic. Does not see cardiologist.       Review of Systems  All other systems reviewed and are negative.   Visit Vitals  BP (!) 147/86 (BP 1 Location: Left arm, BP Patient Position: Sitting)   Pulse 65   Temp 97.1 °F (36.2 °C) (Temporal)   Resp 18   Ht 6' 1\" (1.854 m)   Wt 235 lb (106.6 kg)   SpO2 98%   BMI 31.00 kg/m²       Physical Exam  Constitutional:  No acute distress  HEENT:  Head normocephalic and atraumatic. CV:  Regular rate and rhythm. No murmur. Respiratory:  Lungs clear to auscultation bilaterally  Abdomen:  Soft, non-tender. Skin:  Normal color. Warm and Dry  Extremities:  Non-tender. No pedal edema. Back:  No tenderness  Neuro:  No gross motor deficits    On this date 09/30/2022 I have spent 31 minutes reviewing previous notes, test results and face to face with the patient discussing the diagnosis and importance of compliance with the treatment plan as well as documenting on the day of the visit. Aspects of this note may have been generated using voice recognition software. Despite editing, there may be some syntax errors. An electronic signature was used to authenticate this note.   -- Ruy Kingston NP

## 2022-09-30 NOTE — PROGRESS NOTES
Chief Complaint   Patient presents with    Diabetes     Pt stated that he needs his [address] filled out Ins. Medication Refill     Medication refill          Visit Vitals  BP (!) 147/86 (BP 1 Location: Left arm, BP Patient Position: Sitting)   Pulse 65   Temp 97.1 °F (36.2 °C) (Temporal)   Resp 18   Ht 6' 1\" (1.854 m)   Wt 235 lb (106.6 kg)   SpO2 98%   BMI 31.00 kg/m²           1. Have you been to the ER, urgent care clinic since your last visit? Hospitalized since your last visit? No    2. Have you seen or consulted any other health care providers outside of the [address] [place] [address] since your last visit? Include any pap smears or colon screening.  No

## 2022-10-14 NOTE — PROGRESS NOTES
Pt seen on 9/30/2022 to establish w/ new provider. Labs reviewed then and new lab orders in for future. He was referred to Dr. Jason Jiménez at [address] [place] [address] on 9/13/2022 and Rose[employer] message sent w/ address and phone number to call for an appt.

## 2022-10-20 DIAGNOSIS — E11.65 UNCONTROLLED TYPE 2 DIABETES MELLITUS WITH HYPERGLYCEMIA (HCC): ICD-10-CM

## 2022-10-20 RX ORDER — METFORMIN HYDROCHLORIDE 500 MG/1
TABLET, EXTENDED RELEASE ORAL
Qty: 360 TABLET | Refills: 1 | Status: SHIPPED | OUTPATIENT
Start: 2022-10-20

## 2022-10-20 NOTE — TELEPHONE ENCOUNTER
Last OV: 9/30/22  Next OV: 11/22/22  Last Refill: 4/21/22 (, refills 1)  Last a1c: 8/18/22    Will send to provider for review/auth

## 2022-11-21 ENCOUNTER — TELEPHONE (OUTPATIENT)
Dept: FAMILY MEDICINE CLINIC | Age: 57
End: 2022-11-21

## 2022-11-22 ENCOUNTER — OFFICE VISIT (OUTPATIENT)
Dept: FAMILY MEDICINE CLINIC | Age: 57
End: 2022-11-22
Payer: COMMERCIAL

## 2022-11-22 VITALS
WEIGHT: 236.2 LBS | HEART RATE: 68 BPM | OXYGEN SATURATION: 98 % | SYSTOLIC BLOOD PRESSURE: 145 MMHG | HEIGHT: 73 IN | TEMPERATURE: 98.2 F | BODY MASS INDEX: 31.3 KG/M2 | DIASTOLIC BLOOD PRESSURE: 76 MMHG

## 2022-11-22 DIAGNOSIS — N52.9 ERECTILE DYSFUNCTION, UNSPECIFIED ERECTILE DYSFUNCTION TYPE: Primary | ICD-10-CM

## 2022-11-22 DIAGNOSIS — E08.42 DIABETIC POLYNEUROPATHY ASSOCIATED WITH DIABETES MELLITUS DUE TO UNDERLYING CONDITION (HCC): ICD-10-CM

## 2022-11-22 DIAGNOSIS — E78.00 HYPERCHOLESTEROLEMIA: ICD-10-CM

## 2022-11-22 DIAGNOSIS — I10 ESSENTIAL (PRIMARY) HYPERTENSION: ICD-10-CM

## 2022-11-22 PROCEDURE — 99214 OFFICE O/P EST MOD 30 MIN: CPT | Performed by: NURSE PRACTITIONER

## 2022-11-22 PROCEDURE — 3078F DIAST BP <80 MM HG: CPT | Performed by: NURSE PRACTITIONER

## 2022-11-22 PROCEDURE — 3074F SYST BP LT 130 MM HG: CPT | Performed by: NURSE PRACTITIONER

## 2022-11-22 RX ORDER — TADALAFIL 20 MG/1
20 TABLET ORAL AS NEEDED
Qty: 90 TABLET | Refills: 1 | Status: SHIPPED | OUTPATIENT
Start: 2022-11-22

## 2022-11-22 RX ORDER — ALLOPURINOL 300 MG/1
300 TABLET ORAL DAILY
COMMUNITY
Start: 2022-11-16

## 2022-11-22 NOTE — PROGRESS NOTES
1. Have you been to the ER, urgent care clinic since your last visit? Hospitalized since your last visit? No    2. Have you seen or consulted any other health care providers outside of the [address] [place] [address] since your last visit? Include any pap smears or colon screening.  No    Chief Complaint   Patient presents with    Follow-up    Labs     Visit Vitals  BP (!) 140/85 (BP 1 Location: Left upper arm, BP Patient Position: Sitting, BP Cuff Size: Large adult)   Pulse 68   Temp 98.2 °F (36.8 °C) (Temporal)   Ht 6' 1\" (1.854 m)   Wt 236 lb 3.2 oz (107.1 kg)   SpO2 98%   BMI 31.16 kg/m²       Visit Vitals  BP (!) 145/76 (BP 1 Location: Right upper arm, BP Patient Position: Sitting, BP Cuff Size: Large adult)   Pulse 68   Temp 98.2 °F (36.8 °C) (Temporal)   Ht 6' 1\" (1.854 m)   Wt 236 lb 3.2 oz (107.1 kg)   SpO2 98%   BMI 31.16 kg/m²

## 2022-11-22 NOTE — PROGRESS NOTES
Marlon Mejias (: 1965) is a 62 y.o. male, established patient, here for evaluation of the following chief complaint(s):  Follow-up and Labs    ASSESSMENT/PLAN:  Below is the assessment and plan developed based on review of pertinent history, physical exam, labs, studies, and medications. Based on today's findings we will proceed as follows:     1. Erectile dysfunction, unspecified erectile dysfunction type  -     tadalafiL (CIALIS) 20 mg tablet; Take 1 Tablet by mouth as needed for Erectile Dysfunction. Take 60 minutes prior to anticipated sexual activity  Indications: the inability to have an erection, Normal, Disp-90 Tablet, R-1  2. Essential (primary) hypertension  3. Diabetic polyneuropathy associated with diabetes mellitus due to underlying condition ([facility] [address]  4. Hypercholesterolemia    Hypertension well controlled, no changes needed. Issues discussed include maintaining a diet, weight control and exercise, continued home blood pressure monitoring, maintaining medication compliance, medication side effects, long term hypertension complications, and refraining from tobacco use. Treatment plan: Continue current medications, diet modification, maintaining low sodium diet, blood pressure goal discussed, labwork from today discussed and reviewed, future labwork ordered, A1C, lipids check, patient up to date on maintence and screening exams. Educated on medication, uses, side effects, as well as signs and symptoms that may merit immediate medical attention. Patient is not to share medication and use only as indicated. Patient verbalized understanding and agreement. Return in about 3 months (around 2023) for DM, post endo. SUBJECTIVE/OBJECTIVE:  HPI    Patient is a 62 y.o. Lee Sammias presenting for hypertension followup.  Patient reports blood pressures at home most frequently normal. Patient has symptoms of none of the following; no chest pain, shortness of breath, weakness, orthostatic hypotension, cough, myalgias, rash, headaches, weight gain, leg swelling, palpitations, slow heart rate, fatigue, depression. Patient reports good compliance with medications, no side effects from medications noted. Current treatments include diet modification, weight loss. Review of Systems  All other systems reviewed and are negative. Visit Vitals  BP (!) 145/76 (BP 1 Location: Right upper arm, BP Patient Position: Sitting, BP Cuff Size: Large adult)   Pulse 68   Temp 98.2 °F (36.8 °C) (Temporal)   Ht 6' 1\" (1.854 m)   Wt 236 lb 3.2 oz (107.1 kg)   SpO2 98%   BMI 31.16 kg/m²       Physical Exam  Constitutional:  No acute distress  HEENT:  Head normocephalic and atraumatic. CV:  Regular rate and rhythm. No murmur. Respiratory:  Lungs clear to auscultation bilaterally  Abdomen:  Soft, non-tender. Skin:  Normal color. Warm and Dry  Extremities:  Non-tender. No pedal edema. Back:  No tenderness  Neuro:  No gross motor deficits    On this date 11/22/2022 I have spent 31 minutes reviewing previous notes, test results and face to face with the patient discussing the diagnosis and importance of compliance with the treatment plan as well as documenting on the day of the visit. Aspects of this note may have been generated using voice recognition software. Despite editing, there may be some syntax errors. An electronic signature was used to authenticate this note.   -- Reyna Silva NP

## 2022-11-25 ENCOUNTER — TELEPHONE (OUTPATIENT)
Dept: FAMILY MEDICINE CLINIC | Age: 57
End: 2022-11-25

## 2023-01-26 ENCOUNTER — TELEPHONE (OUTPATIENT)
Dept: FAMILY MEDICINE CLINIC | Age: 58
End: 2023-01-26

## 2023-01-26 NOTE — TELEPHONE ENCOUNTER
----- Message from Nancy Fitzgerald sent at 1/26/2023  8:53 AM EST -----  Regarding: FW: disability paperwork    ----- Message -----  From: Maridee Seip  Sent: 1/26/2023  12:46 AM EST  To: [facility] Nurse  Subject: disability paperwork                             Dr. Melanie Renee, I was informed today from the [employer] that if i wanted to keep recieveing my benefits that i would need to submit paper work from all of the doctors treating me. Once i recieve the forms in the mail can you please fill them out and fax them back to them? Can i drop the forms at your office? Thank you.

## 2023-01-26 NOTE — TELEPHONE ENCOUNTER
Patient called and there are no available appointments open for Zuleika Valdivia. Patient states needs paperwork filled out February 21st and he does not have an appointment until February 28th. Advised patient per , will have clinical staff reach out to Zuleika Valdivia tomorrow morning and see where we can put patient in the schedule or try and wait for a cancellation. Please review with Zuleika Valdivia so patient can be called and advised when to come in.

## 2023-01-31 ENCOUNTER — OFFICE VISIT (OUTPATIENT)
Dept: FAMILY MEDICINE CLINIC | Age: 58
End: 2023-01-31
Payer: COMMERCIAL

## 2023-01-31 VITALS
DIASTOLIC BLOOD PRESSURE: 78 MMHG | SYSTOLIC BLOOD PRESSURE: 140 MMHG | HEART RATE: 71 BPM | OXYGEN SATURATION: 97 % | BODY MASS INDEX: 31.33 KG/M2 | HEIGHT: 73 IN | WEIGHT: 236.4 LBS | TEMPERATURE: 97.7 F

## 2023-01-31 DIAGNOSIS — Z79.4 DIABETES MELLITUS DUE TO UNDERLYING CONDITION WITH DIABETIC POLYNEUROPATHY, WITH LONG-TERM CURRENT USE OF INSULIN (HCC): ICD-10-CM

## 2023-01-31 DIAGNOSIS — E11.3499 SEVERE NONPROLIFERATIVE DIABETIC RETINOPATHY ASSOCIATED WITH TYPE 2 DIABETES MELLITUS, MACULAR EDEMA PRESENCE UNSPECIFIED, UNSPECIFIED LATERALITY (HCC): ICD-10-CM

## 2023-01-31 DIAGNOSIS — M79.642 LEFT HAND PAIN: ICD-10-CM

## 2023-01-31 DIAGNOSIS — E08.42 DIABETES MELLITUS DUE TO UNDERLYING CONDITION WITH DIABETIC POLYNEUROPATHY, WITH LONG-TERM CURRENT USE OF INSULIN (HCC): ICD-10-CM

## 2023-01-31 DIAGNOSIS — R53.1 WEAKNESS OF ONE SIDE OF BODY: Primary | ICD-10-CM

## 2023-01-31 PROCEDURE — 3078F DIAST BP <80 MM HG: CPT | Performed by: NURSE PRACTITIONER

## 2023-01-31 PROCEDURE — 99214 OFFICE O/P EST MOD 30 MIN: CPT | Performed by: NURSE PRACTITIONER

## 2023-01-31 PROCEDURE — 3074F SYST BP LT 130 MM HG: CPT | Performed by: NURSE PRACTITIONER

## 2023-01-31 RX ORDER — METFORMIN HYDROCHLORIDE 500 MG/1
500 TABLET ORAL 2 TIMES DAILY
COMMUNITY

## 2023-01-31 RX ORDER — BLOOD SUGAR DIAGNOSTIC
STRIP MISCELLANEOUS AS DIRECTED
COMMUNITY
Start: 2023-01-03

## 2023-01-31 RX ORDER — LANCETS
EACH MISCELLANEOUS AS DIRECTED
COMMUNITY
Start: 2023-01-03

## 2023-01-31 RX ORDER — ROSUVASTATIN CALCIUM 20 MG/1
TABLET, COATED ORAL
COMMUNITY
End: 2023-01-31

## 2023-01-31 RX ORDER — CYCLOBENZAPRINE HCL 10 MG
10 TABLET ORAL
COMMUNITY
End: 2023-01-31

## 2023-01-31 NOTE — PROGRESS NOTES
Subjective:   Fredrick Brown is a 62 y.o. male  established here with complaints of   Chief Complaint   Patient presents with    Other     Paperwork      Extremity Weakness    Hypertension    Diabetes     Reports legs pain getting worse overtime, saw Dr Hilbert Seip last month and BP was better, but has not slept well in the last 26 hours. No CO, SOB, just pain of the hand, legs. Just persistent hand weakness, has seen neurology and has follow up in march, keeps dropping things with his left hand.      Patient Active Problem List   Diagnosis Code    ED (erectile dysfunction) N52.9    Insomnia G47.00    Gout M10.9    Diabetes mellitus (HCC) E11.9    Diabetes ([facility]) E11.9    Loss of perception for smell R43.0    Onychomycosis B35.1    Diabetic polyneuropathy associated with diabetes mellitus due to underlying condition ([facility] E08.42    Vitamin D deficiency E55.9    Lesion of ulnar nerve G56.20    Stiffness of other specified joint, not elsewhere classified M25.69    Carpal tunnel syndrome of left wrist G56.02    Carpal tunnel syndrome of right wrist G56.01    Localized edema R60.0    Muscle weakness (generalized) M62.81    Anesthesia of skin R20.0    Other disturbances of skin sensation R20.8    Other general symptoms and signs R68.89    Pain in left hand M79.642    Paresthesia of skin R20.2    Uncontrolled type 2 diabetes mellitus with hyperglycemia ([facility]) E11.65    Scar conditions and fibrosis of skin L90.5    Entrapment of left ulnar nerve at elbow G56.22    Ulnar nerve entrapment at right elbow G56.21    Weakness of one side of body R53.1    Type 2 diabetes mellitus ([facility]) E11.9    Hypercholesterolemia E78.00    Positive TERESA (antinuclear antibody) R76.8    Severe nonproliferative diabetic retinopathy associated with type 2 diabetes mellitus ([facility] C54.5675    Disordered sleep G47.9    Essential (primary) hypertension I10     Patient Active Problem List    Diagnosis Date Noted    Essential (primary) hypertension 09/02/2022 Hypercholesterolemia 06/05/2022    Positive TERESA (antinuclear antibody) 06/05/2022    Severe nonproliferative diabetic retinopathy associated with type 2 diabetes mellitus ([facility] 06/05/2022    Disordered sleep 06/05/2022    Scar conditions and fibrosis of skin 06/03/2022    Entrapment of left ulnar nerve at elbow 06/03/2022    Ulnar nerve entrapment at right elbow 06/03/2022    Weakness of one side of body 06/03/2022    Type 2 diabetes mellitus ([facility] 06/03/2022    Diabetic polyneuropathy associated with diabetes mellitus due to underlying condition ([facility] 03/03/2022    Vitamin D deficiency 03/03/2022    Lesion of ulnar nerve 03/03/2022    Stiffness of other specified joint, not elsewhere classified 03/03/2022    Carpal tunnel syndrome of left wrist 03/03/2022    Carpal tunnel syndrome of right wrist 03/03/2022    Localized edema 03/03/2022    Muscle weakness (generalized) 03/03/2022    Anesthesia of skin 03/03/2022    Other disturbances of skin sensation 03/03/2022    Other general symptoms and signs 03/03/2022    Pain in left hand 03/03/2022    Paresthesia of skin 03/03/2022    Uncontrolled type 2 diabetes mellitus with hyperglycemia ([address] 03/03/2022    Loss of perception for smell 11/04/2020    Onychomycosis 11/04/2020    Diabetes ([facility]     ED (erectile dysfunction) 11/27/2013    Insomnia 11/27/2013    Gout 11/27/2013    Diabetes mellitus ([facility] [address] 11/27/2013     Current Outpatient Medications   Medication Sig Dispense Refill    lancets misc as directed. glucose blood VI test strips (OneTouch Verio test strips) strip as directed. gabapentin (NEURONTIN) 800 mg tablet Take 1.5 Tablets by mouth three (3) times daily for 90 days. Max Daily Amount: 3,600 mg. For severe peripheral neuropathy. 405 Tablet 1    allopurinoL (ZYLOPRIM) 300 mg tablet Take 300 mg by mouth daily.       metFORMIN ER (GLUCOPHAGE XR) 500 mg tablet TAKE 2 TABLETS BY MOUTH BEFORE BREAKFAST AND EVENING MEAL EVERY  Tablet 1 valsartan-hydroCHLOROthiazide (DIOVAN-HCT) 320-25 mg per tablet Take 1 Tablet by mouth daily. Indications: high blood pressure 90 Tablet 1    chlorthalidone (HYGROTON) 25 mg tablet Take 1 Tablet by mouth daily. Indications: high blood pressure 90 Tablet 1    glimepiride (AMARYL) 2 mg tablet Take 1 Tablet by mouth daily. 90 Tablet 1    rosuvastatin (CRESTOR) 20 mg tablet Take 1 Tablet by mouth nightly. 90 Tablet 1    amLODIPine (NORVASC) 10 mg tablet Take 1 Tablet by mouth daily. Indications: high blood pressure 90 Tablet 1    cyclobenzaprine (FLEXERIL) 10 mg tablet Take 1 Tablet by mouth two (2) times a day. 180 Tablet 1    semaglutide (Ozempic) 0.25 mg or 0.5 mg/dose (2 mg/1.5 ml) subq pen Inject 0.25 mg under the skin once weekly for 4 weeks, then increase to 0.5 mg once weekly. May increase to 1 mg once weekly after 4 weeks on the 0.5 mg/week dose. Indications: type 2 diabetes mellitus 3 mL 0    Insulin Needles, Disposable, 32 gauge x 5/32\" ndle Use 1 needle weekly to inject Ozempic 90 Pen Needle 1    alcohol swabs (Alcohol Pads) padm Use to cleanse skin prior to injecting Ozempic 300 Pad 3    empagliflozin (Jardiance) 10 mg tablet Take 1 Tablet by mouth in the morning. 90 Tablet 1    glucose blood VI test strips (Accu-Chek Guide test strips) strip USE TO CHECK FINGER STICK BLOOD GLUCOSE 3 TIMES A DAY DX CODE E11.9 300 Strip 3    Blood-Glucose Meter (Accu-Chek Guide Me Glucose Mtr) misc Use to check finger stick blood glucose TID  DX CODE E11.9 1 Each 0    Lancing Device with Lancets (Accu-Chek Multiclix Lancet) kit Use to check finger stick blood glucose TID  DX CODE E11.9 1 Kit 0    lancets (Accu-Chek Multiclix Lancet) misc Use to check finger stick blood glucose TID  DX CODE E11.9 300 Each 3    metFORMIN (GLUCOPHAGE) 500 mg tablet Take 500 mg by mouth two (2) times a day. (Patient not taking: Reported on 1/31/2023)      tadalafiL (CIALIS) 20 mg tablet Take 1 Tablet by mouth as needed for Erectile Dysfunction. Take 60 minutes prior to anticipated sexual activity  Indications: the inability to have an erection (Patient not taking: Reported on 1/31/2023) 90 Tablet 1    hydrOXYzine pamoate (VISTARIL) 50 mg capsule Take 1 to 2 capsules by mouth 30-60 min prior to bedtime for insomnia. (Patient not taking: Reported on 1/31/2023) 180 Capsule 1     Allergies   Allergen Reactions    Teicoplanin Rash     Past Medical History:   Diagnosis Date    Diabetes ([address]     Gout     Hypertension      Past Surgical History:   Procedure Laterality Date    HX CARPAL TUNNEL RELEASE Left 09/21/2020    Ulnar release    HX HEENT      HX ORTHOPAEDIC      HX ORTHOPAEDIC Left 09/2020    Carpal tunnel, elbow and nerve transplant    [place] UNLISTED PROCEDURE ABDOMEN PERITONEUM & OMENTUM       Family History   Problem Relation Age of Onset    Hypertension Mother     Diabetes Father     Stroke Father     Heart Disease Father     Heart Attack Father      Social History     Tobacco Use    Smoking status: Never    Smokeless tobacco: Never   Substance Use Topics    Alcohol use: No      Review of Systems    A comprehensive review of systems was negative except for that written in the HPI. Objective:     Visit Vitals  BP (!) 140/78 (BP 1 Location: Right upper arm, BP Patient Position: Sitting, BP Cuff Size: Large adult)   Pulse 71   Temp 97.7 °F (36.5 °C) (Temporal)   Ht 6' 1\" (1.854 m)   Wt 236 lb 6.4 oz (107.2 kg)   SpO2 97%   BMI 31.19 kg/m²     General appearance: alert, cooperative, no distress, appears stated age  Head: Normocephalic, without obvious abnormality, atraumatic  Neck: supple, symmetrical, trachea midline, no adenopathy, thyroid: not enlarged, symmetric, no tenderness/mass/nodules, no carotid bruit, and no JVD  Back: symmetric, no curvature. ROM normal. No CVA tenderness.   Lungs: clear to auscultation bilaterally  Chest wall: no tenderness  Heart: regular rate and rhythm, S1, S2 normal, no murmur, click, rub or gallop  Abdomen: soft, non-tender. Bowel sounds normal. No masses,  no organomegaly  Extremities: extremities normal, atraumatic, no cyanosis or edema  Neuro[de-identified] Notable weakness of the left upper extremity compared to the right upper extremity with altered perception of the left upper extremity. Abnormal gait but steady. Assessment/Plan:       ICD-10-CM ICD-9-CM    1. Weakness of one side of body  R53.1 728.87       2. Left hand pain  M79.642 729.5       3. Diabetes mellitus due to underlying condition with diabetic polyneuropathy, with long-term current use of insulin ([facility])  E08.42 249.60     Z79.4 357.2      V58.67       4. Severe nonproliferative diabetic retinopathy associated with type 2 diabetes mellitus, macular edema presence unspecified, unspecified laterality ([facility]  E11.3499 250.50      362.06              HTN not well controlled but hesitant to make changes, will defer to cardiology. Follow up with neurology for progressive weakness of the left side    Educated on medication, uses, side effects, as well as signs and symptoms that may merit immediate medical attention. Patient is not to share medication and use only as indicated. Patient verbalized understanding and agreement. Issues discussed include maintaining a proper diet, maintaining medication compliance, medication side effects, future potential medication changes if needed. Treatment plan: Continue current medications, labwork from today discussed and reviewed, future labwork ordered, A1C, lipids check, patient up to date on maintence and screening exams. On this date 01/31/2023 I have spent 32 minutes reviewing previous notes, test results and face to face (virtual) with the patient discussing the diagnosis and importance of compliance with the treatment plan as well as documenting on the day of the visit. Aspects of this note may have been generated using voice recognition software. Despite editing, there may be some syntax errors.     Vik Apodaca, NP

## 2023-01-31 NOTE — PROGRESS NOTES
1. Have you been to the ER, urgent care clinic since your last visit? Hospitalized since your last visit?   no    2. Have you seen or consulted any other health care providers outside of the [address] [place] [address] since your last visit? Include any pap smears or colon screening.     no    Chief Complaint   Patient presents with    Other     Paperwork       Visit Vitals  BP (!) 154/85 (BP 1 Location: Left upper arm, BP Patient Position: Sitting, BP Cuff Size: Large adult)   Pulse 71   Temp 97.7 °F (36.5 °C) (Temporal)   Ht 6' 1\" (1.854 m)   Wt 236 lb 6.4 oz (107.2 kg)   SpO2 97%   BMI 31.19 kg/m²     Visit Vitals  BP (!) 140/78 (BP 1 Location: Right upper arm, BP Patient Position: Sitting, BP Cuff Size: Large adult)   Pulse 71   Temp 97.7 °F (36.5 °C) (Temporal)   Ht 6' 1\" (1.854 m)   Wt 236 lb 6.4 oz (107.2 kg)   SpO2 97%   BMI 31.19 kg/m²

## 2023-02-07 PROBLEM — R53.1 WEAKNESS OF ONE SIDE OF BODY: Status: ACTIVE | Noted: 2022-06-03

## 2023-02-17 ENCOUNTER — TELEPHONE (OUTPATIENT)
Dept: FAMILY MEDICINE CLINIC | Age: 58
End: 2023-02-17

## 2023-02-17 NOTE — TELEPHONE ENCOUNTER
Patient called and  would like to know when he can expect a call from Juvenal Latham. He stated sent message on Tuesday and still no response. Please call patient and advise. Dr. Greg Callahan, I just seen Dr. Rick Evans today about my diabetics and she was concern about taking valsatan hctz and chlorthalidone at the same time because of what they might due to my potassium levels. I told that I have an appointment with you on the 28th of this month, but she wanted me to ask you about this as soon as possible. Thank you. Sundar Johnson LPN         40:39 AM  Note   ----- Message from Claire Telles sent at 2/14/2023 10:59 AM EST -----  Regarding: blood pressure medicine  Dr. Greg Callahan, I just seen Dr. Rick Evans today about my diabetics and she was concern about taking valsatan hctz and chlorthalidone at the same time because of what they might due to my potassium levels. I told that I have an appointment with you on the 28th of this month, but she wanted me to ask you about this as soon as possible. Thank you.

## 2023-02-22 ENCOUNTER — TELEPHONE (OUTPATIENT)
Dept: NEUROLOGY | Age: 58
End: 2023-02-22

## 2023-02-25 ENCOUNTER — PATIENT MESSAGE (OUTPATIENT)
Dept: NEUROLOGY | Age: 58
End: 2023-02-25

## 2023-02-28 ENCOUNTER — OFFICE VISIT (OUTPATIENT)
Dept: FAMILY MEDICINE CLINIC | Age: 58
End: 2023-02-28
Payer: COMMERCIAL

## 2023-02-28 VITALS
WEIGHT: 227.2 LBS | DIASTOLIC BLOOD PRESSURE: 80 MMHG | HEART RATE: 83 BPM | OXYGEN SATURATION: 97 % | HEIGHT: 73 IN | SYSTOLIC BLOOD PRESSURE: 132 MMHG | TEMPERATURE: 97.1 F | BODY MASS INDEX: 30.11 KG/M2

## 2023-02-28 DIAGNOSIS — R20.8 OTHER DISTURBANCES OF SKIN SENSATION: ICD-10-CM

## 2023-02-28 DIAGNOSIS — E08.42 DIABETIC POLYNEUROPATHY ASSOCIATED WITH DIABETES MELLITUS DUE TO UNDERLYING CONDITION (HCC): ICD-10-CM

## 2023-02-28 DIAGNOSIS — G56.02 CARPAL TUNNEL SYNDROME OF LEFT WRIST: ICD-10-CM

## 2023-02-28 DIAGNOSIS — R53.1 WEAKNESS OF ONE SIDE OF BODY: ICD-10-CM

## 2023-02-28 DIAGNOSIS — G56.01 CARPAL TUNNEL SYNDROME OF RIGHT WRIST: ICD-10-CM

## 2023-02-28 DIAGNOSIS — I10 ESSENTIAL (PRIMARY) HYPERTENSION: Primary | ICD-10-CM

## 2023-02-28 DIAGNOSIS — E11.65 UNCONTROLLED TYPE 2 DIABETES MELLITUS WITH HYPERGLYCEMIA (HCC): ICD-10-CM

## 2023-02-28 PROCEDURE — 3075F SYST BP GE 130 - 139MM HG: CPT | Performed by: NURSE PRACTITIONER

## 2023-02-28 PROCEDURE — 3079F DIAST BP 80-89 MM HG: CPT | Performed by: NURSE PRACTITIONER

## 2023-02-28 PROCEDURE — 99214 OFFICE O/P EST MOD 30 MIN: CPT | Performed by: NURSE PRACTITIONER

## 2023-02-28 NOTE — PROGRESS NOTES
Subjective:   Fredrick Brown is a 62 y.o. male  established here with complaints of   Chief Complaint   Patient presents with    Follow-up    Diabetes      Reports that he was getting low blood sugar with the Ozempic and stopped it, last A1c dropped from 11 to 8.1, also stopped glimepiride as well. Feeling better,glucose this morning is 128. Now resuming Ozempic with Ozempic 0.5     Cramping of the knee getting worse, follow up with pain specialist on March 11  Also with neuro appt later in march for the Left side weakness. Worsening, objects that are thing drops easily, tries to compensate with aid from the other hand. Still numbing and neuropathy. BP stable with no CO, SOB, HA.     Patient Active Problem List   Diagnosis Code    ED (erectile dysfunction) N52.9    Insomnia G47.00    Gout M10.9    Diabetes mellitus (HCC) E11.9    Diabetes (HCC) E11.9    Loss of perception for smell R43.0    Onychomycosis B35.1    Diabetic polyneuropathy associated with diabetes mellitus due to underlying condition ([facility] E08.42    Vitamin D deficiency E55.9    Lesion of ulnar nerve G56.20    Stiffness of other specified joint, not elsewhere classified M25.69    Carpal tunnel syndrome of left wrist G56.02    Carpal tunnel syndrome of right wrist G56.01    Localized edema R60.0    Muscle weakness (generalized) M62.81    Anesthesia of skin R20.0    Other disturbances of skin sensation R20.8    Other general symptoms and signs R68.89    Pain in left hand M79.642    Paresthesia of skin R20.2    Uncontrolled type 2 diabetes mellitus with hyperglycemia (HCC) E11.65    Scar conditions and fibrosis of skin L90.5    Entrapment of left ulnar nerve at elbow G56.22    Ulnar nerve entrapment at right elbow G56.21    Weakness of one side of body R53.1    Type 2 diabetes mellitus (HCC) E11.9    Hypercholesterolemia E78.00    Positive TERESA (antinuclear antibody) R76.8    Severe nonproliferative diabetic retinopathy associated with type 2 diabetes mellitus ([facility] I30.0624    Disordered sleep G47.9    Essential (primary) hypertension I10     Patient Active Problem List    Diagnosis Date Noted    Essential (primary) hypertension 09/02/2022    Hypercholesterolemia 06/05/2022    Positive TERESA (antinuclear antibody) 06/05/2022    Severe nonproliferative diabetic retinopathy associated with type 2 diabetes mellitus ([address] 06/05/2022    Disordered sleep 06/05/2022    Scar conditions and fibrosis of skin 06/03/2022    Entrapment of left ulnar nerve at elbow 06/03/2022    Ulnar nerve entrapment at right elbow 06/03/2022    Weakness of one side of body 06/03/2022    Type 2 diabetes mellitus ([facility] 06/03/2022    Diabetic polyneuropathy associated with diabetes mellitus due to underlying condition ([facility] 03/03/2022    Vitamin D deficiency 03/03/2022    Lesion of ulnar nerve 03/03/2022    Stiffness of other specified joint, not elsewhere classified 03/03/2022    Carpal tunnel syndrome of left wrist 03/03/2022    Carpal tunnel syndrome of right wrist 03/03/2022    Localized edema 03/03/2022    Muscle weakness (generalized) 03/03/2022    Anesthesia of skin 03/03/2022    Other disturbances of skin sensation 03/03/2022    Other general symptoms and signs 03/03/2022    Pain in left hand 03/03/2022    Paresthesia of skin 03/03/2022    Uncontrolled type 2 diabetes mellitus with hyperglycemia ([address] 03/03/2022    Loss of perception for smell 11/04/2020    Onychomycosis 11/04/2020    Diabetes ([facility]     ED (erectile dysfunction) 11/27/2013    Insomnia 11/27/2013    Gout 11/27/2013    Diabetes mellitus ([address] 11/27/2013     Current Outpatient Medications   Medication Sig Dispense Refill    lancets misc as directed. glucose blood VI test strips (OneTouch Verio test strips) strip as directed. gabapentin (NEURONTIN) 800 mg tablet Take 1.5 Tablets by mouth three (3) times daily for 90 days. Max Daily Amount: 3,600 mg. For severe peripheral neuropathy.  405 Tablet 1    metFORMIN ER (GLUCOPHAGE XR) 500 mg tablet TAKE 2 TABLETS BY MOUTH BEFORE BREAKFAST AND EVENING MEAL EVERY  Tablet 1    valsartan-hydroCHLOROthiazide (DIOVAN-HCT) 320-25 mg per tablet Take 1 Tablet by mouth daily. Indications: high blood pressure 90 Tablet 1    chlorthalidone (HYGROTON) 25 mg tablet Take 1 Tablet by mouth daily. Indications: high blood pressure 90 Tablet 1    glimepiride (AMARYL) 2 mg tablet Take 1 Tablet by mouth daily. 90 Tablet 1    rosuvastatin (CRESTOR) 20 mg tablet Take 1 Tablet by mouth nightly. 90 Tablet 1    amLODIPine (NORVASC) 10 mg tablet Take 1 Tablet by mouth daily. Indications: high blood pressure 90 Tablet 1    semaglutide (Ozempic) 0.25 mg or 0.5 mg/dose (2 mg/1.5 ml) subq pen Inject 0.25 mg under the skin once weekly for 4 weeks, then increase to 0.5 mg once weekly. May increase to 1 mg once weekly after 4 weeks on the 0.5 mg/week dose. Indications: type 2 diabetes mellitus 3 mL 0    Insulin Needles, Disposable, 32 gauge x 5/32\" ndle Use 1 needle weekly to inject Ozempic 90 Pen Needle 1    alcohol swabs (Alcohol Pads) padm Use to cleanse skin prior to injecting Ozempic 300 Pad 3    empagliflozin (Jardiance) 10 mg tablet Take 1 Tablet by mouth in the morning.  90 Tablet 1    glucose blood VI test strips (Accu-Chek Guide test strips) strip USE TO CHECK FINGER STICK BLOOD GLUCOSE 3 TIMES A DAY DX CODE E11.9 300 Strip 3    Blood-Glucose Meter (Accu-Chek Guide Me Glucose Mtr) misc Use to check finger stick blood glucose TID  DX CODE E11.9 1 Each 0    Lancing Device with Lancets (Accu-Chek Multiclix Lancet) kit Use to check finger stick blood glucose TID  DX CODE E11.9 1 Kit 0    lancets (Accu-Chek Multiclix Lancet) misc Use to check finger stick blood glucose TID  DX CODE E11.9 300 Each 3     Allergies   Allergen Reactions    Teicoplanin Rash     Past Medical History:   Diagnosis Date    Diabetes ([address]     Gout     Hypertension      Past Surgical History:   Procedure Laterality Date    HX CARPAL TUNNEL RELEASE Left 09/21/2020    Ulnar release    HX HEENT      HX ORTHOPAEDIC      HX ORTHOPAEDIC Left 09/2020    Carpal tunnel, elbow and nerve transplant    [place] UNLISTED PROCEDURE ABDOMEN PERITONEUM & OMENTUM       Family History   Problem Relation Age of Onset    Hypertension Mother     Diabetes Father     Stroke Father     Heart Disease Father     Heart Attack Father      Social History     Tobacco Use    Smoking status: Never    Smokeless tobacco: Never   Substance Use Topics    Alcohol use: No      Review of Systems    A comprehensive review of systems was negative except for that written in the HPI. Objective:     Vitals:    02/28/23 0937   Pulse: 83   BP: 132/80   Temp: 97.1 °F (36.2 °C)      General appearance: alert, cooperative, no distress, appears stated age  Head: Normocephalic, without obvious abnormality, atraumatic  Neck: supple, symmetrical, trachea midline, no adenopathy, thyroid: not enlarged, symmetric, no tenderness/mass/nodules, no carotid bruit, and no JVD  Back: symmetric, no curvature. ROM normal. No CVA tenderness. Lungs: clear to auscultation bilaterally  Chest wall: no tenderness  Heart: regular rate and rhythm, S1, S2 normal, no murmur, click, rub or gallop  Abdomen: soft, non-tender. Bowel sounds normal. No masses,  no organomegaly  Extremities: extremities normal, atraumatic, no cyanosis or edema  Neuro[de-identified] Notable weakness of the left upper extremity compared to the right upper extremity with altered perception of the left upper extremity. Abnormal gait but steady. Assessment/Plan:       ICD-10-CM ICD-9-CM    1. Essential (primary) hypertension  I10 401.9       2. Diabetic polyneuropathy associated with diabetes mellitus due to underlying condition ([facility])  E08.42 249.60      357.2       3. Uncontrolled type 2 diabetes mellitus with hyperglycemia ([facility])  E11.65 250.02       4. Carpal tunnel syndrome of left wrist  G56.02 354.0       5.  Carpal tunnel syndrome of right wrist  G56.01 354.0       6. Weakness of one side of body  R53.1 728.87       7. Other disturbances of skin sensation  R20.8 782.0         Follow-up and Dispositions    Return in about 3 months (around 5/28/2023) for HTN, BP manual, if edema make changes to regimen. .        Still very limited as no resolution for his pain and weakness have been achieved. No appropriate  to do any meaningful work. BP ok today, will not add new med, but is edema return, follow up sooner than 3 months. Issues discussed include maintaining a proper diet, maintaining medication compliance, medication side effects, future potential medication changes if needed. Treatment plan: Continue current medications, labwork from today discussed and reviewed, future labwork ordered, A1C, lipids check, patient up to date on maintence and screening exams. Follow-up and Dispositions    Return in about 3 months (around 5/28/2023) for HTN, BP manual, if edema make changes to regimen. .        On this date 02/28/2023 I have spent 33 minutes reviewing previous notes, test results and face to face (virtual) with the patient discussing the diagnosis and importance of compliance with the treatment plan as well as documenting on the day of the visit. Aspects of this note may have been generated using voice recognition software. Despite editing, there may be some syntax errors.     Josue Kidd NP

## 2023-02-28 NOTE — PROGRESS NOTES
1. Have you been to the ER, urgent care clinic since your last visit? Hospitalized since your last visit? No    2. Have you seen or consulted any other health care providers outside of the [address] [place] [address] since your last visit? Include any pap smears or colon screening.  No  Chief Complaint   Patient presents with    Follow-up    Diabetes     Visit Vitals  /80 (BP 1 Location: Left upper arm, BP Patient Position: Sitting, BP Cuff Size: Large adult) Comment: manual   Pulse 83   Temp 97.1 °F (36.2 °C) (Temporal)   Ht 6' 1\" (1.854 m)   Wt 227 lb 3.2 oz (103.1 kg)   SpO2 97%   BMI 29.98 kg/m²

## 2023-03-21 ENCOUNTER — OFFICE VISIT (OUTPATIENT)
Dept: NEUROLOGY | Age: 58
End: 2023-03-21
Payer: COMMERCIAL

## 2023-03-21 VITALS — DIASTOLIC BLOOD PRESSURE: 80 MMHG | HEART RATE: 92 BPM | OXYGEN SATURATION: 97 % | SYSTOLIC BLOOD PRESSURE: 124 MMHG

## 2023-03-21 DIAGNOSIS — E11.42 DIABETIC SENSORIMOTOR NEUROPATHY (HCC): ICD-10-CM

## 2023-03-21 DIAGNOSIS — E11.40 CHRONIC PAINFUL DIABETIC NEUROPATHY (HCC): Primary | ICD-10-CM

## 2023-03-21 PROCEDURE — 3079F DIAST BP 80-89 MM HG: CPT | Performed by: PSYCHIATRY & NEUROLOGY

## 2023-03-21 PROCEDURE — 3074F SYST BP LT 130 MM HG: CPT | Performed by: PSYCHIATRY & NEUROLOGY

## 2023-03-21 PROCEDURE — 99214 OFFICE O/P EST MOD 30 MIN: CPT | Performed by: PSYCHIATRY & NEUROLOGY

## 2023-03-21 NOTE — PROGRESS NOTES
Lexi Cancer (1965) is a 62 y.o. male, established patient, here for evaluation of the following     Chief complaint(s):   Chief Complaint   Patient presents with    Follow-up       SUBJECTIVE/ OBJECTIVE:    HPI: 62 y.o. male        Continues to describe severe pins-needles sensation in feet, up to knees, in both hand and left forearm    Patient saw a [facility] Management Clinic on 3-. Says the visit was terrible: visit was very delayed (3 hours there), not satisfied with care or explanation of  proposed procedure. Pt was able to get a copy of the clinic note for me to review. They planned to refer him to Psychologist for evaluation for pre-op for Spinal Cord Stimulator trial.     Remains on Gabapentin 800 mg, 1.5 tabs three times a day    Stopped Lyrica 100 mg TID after last visit here in Sept 2022. Didn't notice any worsening of pain after stopping it. Also stopped Cybalta 60 mg/ day in Nov 2022 (Rx'd by PCP) as he couldn't tell if it was helping. Pain didn't seem any worse after stopping it. Pain has increased over past 1 month for unknown reason. A1c is actually improved since starting Ozempic. Had tried alternating Amitriptyline and Doxepin to help sleep (wasn't sleeping due to pain) but those didn't help, and Trazodone didn't help. Tried Vistaril and that didn't help either. Still wakes up after 4 hrs due to pain    Reviewed . No concerning findings. Patient is only filling prescriptions of gabapentin prescribed by me. No other controlled substances.        ========================================    Brief Hx:     Initial Visit: 9-14-21 (see for full details)    EMG (11-2-2021, bilateral lower extremities): 1) Severe sensorimotor polyneuropathy with absent tibial F waves and tibial H reflexes (due to absent right tibial motor response and near absent left tibial motor response).   2) Chronic active denervation in distal lower extremity muscles consistent with severity of underlying peripheral neuropathy. Likely due to history of longstanding diabetes mellitus. If any clinical concern of a demyelinating neuropathy, recommend checking bilateral upper extremity EMG nerve conduction studies including F waves). 3) There is active denervation in the left gluteus ynug (proximal S1 muscle), compatible with a chronic, active, left S1 lumbar radiculopathy. 4) No electrodiagnostic findings to suggest right lumbar radiculopathy. Allergies   Allergen Reactions    Teicoplanin Rash         Current Outpatient Medications:     lancets misc, as directed., Disp: , Rfl:     glucose blood VI test strips (OneTouch Verio test strips) strip, as directed., Disp: , Rfl:     gabapentin (NEURONTIN) 800 mg tablet, Take 1.5 Tablets by mouth three (3) times daily for 90 days. Max Daily Amount: 3,600 mg. For severe peripheral neuropathy. , Disp: 405 Tablet, Rfl: 1    metFORMIN ER (GLUCOPHAGE XR) 500 mg tablet, TAKE 2 TABLETS BY MOUTH BEFORE BREAKFAST AND EVENING MEAL EVERY DAY, Disp: 360 Tablet, Rfl: 1    valsartan-hydroCHLOROthiazide (DIOVAN-HCT) 320-25 mg per tablet, Take 1 Tablet by mouth daily. Indications: high blood pressure, Disp: 90 Tablet, Rfl: 1    rosuvastatin (CRESTOR) 20 mg tablet, Take 1 Tablet by mouth nightly., Disp: 90 Tablet, Rfl: 1    amLODIPine (NORVASC) 10 mg tablet, Take 1 Tablet by mouth daily. Indications: high blood pressure, Disp: 90 Tablet, Rfl: 1    semaglutide (Ozempic) 0.25 mg or 0.5 mg/dose (2 mg/1.5 ml) subq pen, Inject 0.25 mg under the skin once weekly for 4 weeks, then increase to 0.5 mg once weekly. May increase to 1 mg once weekly after 4 weeks on the 0.5 mg/week dose.   Indications: type 2 diabetes mellitus, Disp: 3 mL, Rfl: 0    alcohol swabs (Alcohol Pads) padm, Use to cleanse skin prior to injecting Ozempic, Disp: 300 Pad, Rfl: 3    empagliflozin (Jardiance) 10 mg tablet, Take 1 Tablet by mouth in the morning., Disp: 90 Tablet, Rfl: 1    chlorthalidone (HYGROTON) 25 mg tablet, Take 1 Tablet by mouth daily. Indications: high blood pressure, Disp: 90 Tablet, Rfl: 1     has a past medical history of Diabetes ([address] Gout, and Hypertension. has a past surgical history that includes hx heent; pr unlisted procedure abdomen peritoneum & omentum; hx carpal tunnel release (Left, 09/21/2020); hx orthopaedic; and hx orthopaedic (Left, 09/2020). Physical Exam:    Vitals:    03/21/23 1103   BP: 124/80   BP 1 Location: Left upper arm   BP Patient Position: Sitting   BP Cuff Size: Adult   Pulse: 92   SpO2: 97%      Entirety of visit spent discussing symptoms    ========================================    ASSESSMENT/ PLAN:       ICD-10-CM ICD-9-CM    1. Chronic painful diabetic neuropathy (HCC)  E11.40 250.60 REFERRAL TO PAIN MANAGEMENT     357.2       2. Diabetic sensorimotor neuropathy (HCC)  E11.42 250.60 REFERRAL TO PAIN MANAGEMENT     357.2         Intractable severe painful DM neuropathy    No relief with Gabapentin, Lyrica, Cymbalta, Amitriptyline, or Doxepin    Continue Gabapentin 800 mg one and half tablets three times a day    Entered new referral to pain management to discuss spinal cord stimulator trial regarding his intractable diabetic neuropathy pain    Follow-up in 3 months      An electronic signature was used to authenticate this note.   -- Mimi Palomo MD

## 2023-03-21 NOTE — PROGRESS NOTES
Neuropathy has gotten worse, all symptoms  Said pain management did not go well, said the worst clinic  Balance worse, has fallen recently

## 2023-03-21 NOTE — PATIENT INSTRUCTIONS
Consider contacting [facility]/ Dr Shorty Eastman (Interventional Pain Management) [phone] to see if he can evaluate your for possible spinal cord stimulator trial regarding your severe, intractable diabetic peripheral neuropathy

## 2023-03-30 DIAGNOSIS — I10 PRIMARY HYPERTENSION: ICD-10-CM

## 2023-03-30 DIAGNOSIS — E11.00 TYPE 2 DIABETES MELLITUS WITH HYPEROSMOLARITY WITHOUT COMA, WITHOUT LONG-TERM CURRENT USE OF INSULIN (HCC): Primary | ICD-10-CM

## 2023-03-30 RX ORDER — CHLORTHALIDONE 25 MG/1
TABLET ORAL
Qty: 90 TABLET | Refills: 1 | Status: SHIPPED | OUTPATIENT
Start: 2023-03-30

## 2023-03-30 RX ORDER — AMLODIPINE BESYLATE 10 MG/1
TABLET ORAL
Qty: 90 TABLET | Refills: 1 | Status: SHIPPED | OUTPATIENT
Start: 2023-03-30

## 2023-03-30 NOTE — TELEPHONE ENCOUNTER
Last OV:02/28/2023  Next OV:05/30/2023  Last Refill:07/26/2022  Last (labs):11/14/2022    -*Insert any notes here*  Requested Prescriptions     Pending Prescriptions Disp Refills    empagliflozin (Jardiance) 10 mg tablet 90 Tablet 1     Sig: Take 1 Tablet by mouth daily.

## 2023-03-30 NOTE — TELEPHONE ENCOUNTER
[facility] sent in a prescription refill request for the below medication:    Jardiance 10 Mg Tablet  Qty:90  Refills: 1  Date Last Refilled: 12/30/2022

## 2023-05-02 ENCOUNTER — TRANSCRIBE ORDER (OUTPATIENT)
Dept: SCHEDULING | Age: 58
End: 2023-05-02

## 2023-05-02 DIAGNOSIS — M51.26 OTHER INTERVERTEBRAL DISC DISPLACEMENT, LUMBAR REGION: Primary | ICD-10-CM

## 2023-05-09 DIAGNOSIS — M51.26 OTHER INTERVERTEBRAL DISC DISPLACEMENT, LUMBAR REGION: Primary | ICD-10-CM

## 2023-05-20 RX ORDER — SEMAGLUTIDE 1.34 MG/ML
INJECTION, SOLUTION SUBCUTANEOUS
COMMUNITY
Start: 2022-09-02

## 2023-05-20 RX ORDER — ROSUVASTATIN CALCIUM 20 MG/1
1 TABLET, COATED ORAL NIGHTLY
COMMUNITY
Start: 2022-09-30 | End: 2023-05-24 | Stop reason: SDUPTHER

## 2023-05-20 RX ORDER — LANCETS 30 GAUGE
EACH MISCELLANEOUS
COMMUNITY
Start: 2023-01-03 | End: 2023-05-30 | Stop reason: ALTCHOICE

## 2023-05-20 RX ORDER — VALSARTAN AND HYDROCHLOROTHIAZIDE 320; 25 MG/1; MG/1
TABLET, FILM COATED ORAL
COMMUNITY
Start: 2023-04-14

## 2023-05-20 RX ORDER — AMLODIPINE BESYLATE 10 MG/1
TABLET ORAL
COMMUNITY
Start: 2023-03-30

## 2023-05-20 RX ORDER — METFORMIN HYDROCHLORIDE 500 MG/1
TABLET, EXTENDED RELEASE ORAL
COMMUNITY
Start: 2023-04-14

## 2023-05-20 RX ORDER — CHLORTHALIDONE 25 MG/1
TABLET ORAL
COMMUNITY
Start: 2023-03-30 | End: 2023-05-30 | Stop reason: ALTCHOICE

## 2023-05-24 RX ORDER — ROSUVASTATIN CALCIUM 20 MG/1
20 TABLET, COATED ORAL NIGHTLY
Qty: 90 TABLET | Refills: 1 | OUTPATIENT
Start: 2023-05-24

## 2023-05-24 NOTE — TELEPHONE ENCOUNTER
Requested Prescriptions     Pending Prescriptions Disp Refills    rosuvastatin (CRESTOR) 20 MG tablet 90 tablet 1     Sig: Take 1 tablet by mouth nightly        Last OV:2/28/23  Next OV:5/30/23  Last Refill:9/30/22  Qty 90  Refills 1

## 2023-05-30 ENCOUNTER — OFFICE VISIT (OUTPATIENT)
Facility: CLINIC | Age: 58
End: 2023-05-30
Payer: COMMERCIAL

## 2023-05-30 VITALS
DIASTOLIC BLOOD PRESSURE: 79 MMHG | WEIGHT: 220.6 LBS | TEMPERATURE: 97.8 F | BODY MASS INDEX: 29.24 KG/M2 | OXYGEN SATURATION: 98 % | SYSTOLIC BLOOD PRESSURE: 136 MMHG | HEIGHT: 73 IN | HEART RATE: 81 BPM

## 2023-05-30 DIAGNOSIS — Z11.4 SCREENING FOR HIV WITHOUT PRESENCE OF RISK FACTORS: ICD-10-CM

## 2023-05-30 DIAGNOSIS — I10 ESSENTIAL (PRIMARY) HYPERTENSION: ICD-10-CM

## 2023-05-30 DIAGNOSIS — G62.9 NEUROPATHY: Primary | ICD-10-CM

## 2023-05-30 DIAGNOSIS — E08.42 DIABETIC POLYNEUROPATHY ASSOCIATED WITH DIABETES MELLITUS DUE TO UNDERLYING CONDITION (HCC): ICD-10-CM

## 2023-05-30 DIAGNOSIS — E11.65 TYPE 2 DIABETES MELLITUS WITH HYPERGLYCEMIA, WITHOUT LONG-TERM CURRENT USE OF INSULIN (HCC): ICD-10-CM

## 2023-05-30 DIAGNOSIS — E78.5 HYPERLIPIDEMIA, UNSPECIFIED HYPERLIPIDEMIA TYPE: ICD-10-CM

## 2023-05-30 PROCEDURE — 99214 OFFICE O/P EST MOD 30 MIN: CPT | Performed by: NURSE PRACTITIONER

## 2023-05-30 PROCEDURE — 3078F DIAST BP <80 MM HG: CPT | Performed by: NURSE PRACTITIONER

## 2023-05-30 PROCEDURE — 3075F SYST BP GE 130 - 139MM HG: CPT | Performed by: NURSE PRACTITIONER

## 2023-05-30 RX ORDER — ASPIRIN 81 MG/1
81 TABLET ORAL DAILY
COMMUNITY

## 2023-05-30 RX ORDER — AMITRIPTYLINE HYDROCHLORIDE 25 MG/1
25 TABLET, FILM COATED ORAL NIGHTLY
Qty: 90 TABLET | Refills: 3 | Status: SHIPPED | OUTPATIENT
Start: 2023-05-30

## 2023-05-30 RX ORDER — ROSUVASTATIN CALCIUM 20 MG/1
20 TABLET, COATED ORAL NIGHTLY
Qty: 90 TABLET | Refills: 1 | Status: SHIPPED | OUTPATIENT
Start: 2023-05-30

## 2023-05-30 NOTE — PROGRESS NOTES
1. Have you been to the ER, urgent care clinic since your last visit? Hospitalized since your last visit? No    2. Have you seen or consulted any other health care providers outside of the [address] [place] [address] since your last visit? Include any pap smears or colon screening.  No  Chief Complaint   Patient presents with    Hypertension    Diabetes    Follow-up    Medication Refill     /79 (Site: Left Upper Arm, Position: Sitting, Cuff Size: Large Adult)   Pulse 81   Temp 97.8 °F (36.6 °C) (Temporal)   Ht 6' 1\" (1.854 m)   Wt 220 lb 9.6 oz (100.1 kg)   SpO2 98%   BMI 29.10 kg/m²

## 2023-05-30 NOTE — PROGRESS NOTES
Subjective:   Rita Damon is a 62 y.o. male  established here with complaints of Hypertension, Diabetes, Follow-up, and Medication Refill   Diabetes Reports did see endo and now A1c from 8.5 - 8.0, continues with Ozempic, follow up in 3 months, still with weight loss management with diatary changes. Neuropathy Did see neuro in March and will continues with currently Rx meds, still falling, multiple times, neuropathic pain worsening, but neuro did not changed meds. Needs to find new neuro because current neuro leaving. Has taken Amitriptyline and Cymbalta in the past with gabapentin with minimal improvement. Cant not sleep well and pain getting worse with tingling and pain. Patient presenting for hypertension followup. Patient reports blood pressures at home most frequently not checked. Patient denies chest pain, shortness of breath, weakness, orthostatic hypotension, cough, myalgias, rash, headaches, weight gain, leg swelling, palpitations, slow heart rate, fatigue, depression. Patient reports good compliance with medications, no side effects from medications noted. Current treatments include diet modification, weight loss. Patient presenting for hyperlipidemia check. Patient has history including no CAD,  diabetic,  hypertension, no tobacco, no obesity, and no prior TIA/stroke, LDL goal is under 100. Patient denies chest pain, shortness of breath, weakness, orthostatic hypotension, cough, myalgias, rash, headaches, weight gain, leg swelling, palpitations, slow heart rate, fatigue, depression. Patient reports good compliance with medications, no side effects from medications noted. Current treatments include diet modification, weight loss, exercise.      Patient Active Problem List   Diagnosis    Anesthesia of skin    Carpal tunnel syndrome of right wrist    Uncontrolled type 2 diabetes mellitus with hyperglycemia (HCC)    Lesion of ulnar nerve    Carpal tunnel syndrome of left wrist    Paresthesia of

## 2023-09-12 ENCOUNTER — OFFICE VISIT (OUTPATIENT)
Facility: CLINIC | Age: 58
End: 2023-09-12
Payer: COMMERCIAL

## 2023-09-12 VITALS — BODY MASS INDEX: 28.94 KG/M2 | HEIGHT: 73 IN | WEIGHT: 218.4 LBS

## 2023-09-12 DIAGNOSIS — M1A.9XX0 CHRONIC GOUT WITHOUT TOPHUS, UNSPECIFIED CAUSE, UNSPECIFIED SITE: ICD-10-CM

## 2023-09-12 DIAGNOSIS — I10 ESSENTIAL (PRIMARY) HYPERTENSION: ICD-10-CM

## 2023-09-12 DIAGNOSIS — Z12.11 ENCOUNTER FOR SCREENING FOR MALIGNANT NEOPLASM OF COLON: ICD-10-CM

## 2023-09-12 DIAGNOSIS — E78.5 HYPERLIPIDEMIA, UNSPECIFIED HYPERLIPIDEMIA TYPE: ICD-10-CM

## 2023-09-12 DIAGNOSIS — E08.42 DIABETIC POLYNEUROPATHY ASSOCIATED WITH DIABETES MELLITUS DUE TO UNDERLYING CONDITION (HCC): ICD-10-CM

## 2023-09-12 DIAGNOSIS — G62.9 NEUROPATHY: Primary | ICD-10-CM

## 2023-09-12 PROCEDURE — 99214 OFFICE O/P EST MOD 30 MIN: CPT | Performed by: NURSE PRACTITIONER

## 2023-09-12 RX ORDER — AMLODIPINE BESYLATE 10 MG/1
TABLET ORAL
Qty: 90 TABLET | Refills: 1 | Status: SHIPPED | OUTPATIENT
Start: 2023-09-12

## 2023-09-12 RX ORDER — ALLOPURINOL 300 MG/1
300 TABLET ORAL DAILY
Qty: 90 TABLET | Refills: 1 | Status: SHIPPED | OUTPATIENT
Start: 2023-09-12

## 2023-09-12 RX ORDER — AMITRIPTYLINE HYDROCHLORIDE 25 MG/1
25 TABLET, FILM COATED ORAL NIGHTLY
Qty: 90 TABLET | Refills: 1 | Status: SHIPPED | OUTPATIENT
Start: 2023-09-12

## 2023-09-12 RX ORDER — ROSUVASTATIN CALCIUM 20 MG/1
20 TABLET, COATED ORAL NIGHTLY
Qty: 90 TABLET | Refills: 1 | Status: SHIPPED | OUTPATIENT
Start: 2023-09-12

## 2023-09-12 RX ORDER — VALSARTAN AND HYDROCHLOROTHIAZIDE 320; 25 MG/1; MG/1
TABLET, FILM COATED ORAL
Qty: 90 TABLET | Refills: 1 | Status: SHIPPED | OUTPATIENT
Start: 2023-09-12

## 2023-09-12 ASSESSMENT — PATIENT HEALTH QUESTIONNAIRE - PHQ9
SUM OF ALL RESPONSES TO PHQ QUESTIONS 1-9: 0
1. LITTLE INTEREST OR PLEASURE IN DOING THINGS: 0
SUM OF ALL RESPONSES TO PHQ QUESTIONS 1-9: 0
2. FEELING DOWN, DEPRESSED OR HOPELESS: 0
SUM OF ALL RESPONSES TO PHQ9 QUESTIONS 1 & 2: 0

## 2023-09-12 NOTE — PROGRESS NOTES
Subjective:   Linsey Perry is a 62 y.o. male  established here with complaints of Follow-up, Neurologic Problem, Discuss Medications, and Toe Injury   DM Reports Ozempic increased to 1mg and Jardiance 10 to 25 but had a new refill and taking 20 mg (2 tabs until he runs out)  Hx of gout, had stopped allopurinol, on Saturday started having a \"gout attack\" started taking allopurinol again and the pain has recede. However he was cutting the nail to see if it improved the issue and now with mild edema and crusting of the nail bed, right great toe, no pain. Patient presenting for hypertension and hyperlipidemia followup. Have lost some weight with ozempic which has also helped with HTN. Patient reports blood pressures at home most frequently between 120/80 and 130/88 . Patient reports none Patient denies chest pain, shortness of breath, cough, rash, weight gain, leg swelling, and palpitations. Patient reports  good compliance with medications, no side effects from medications noted, and good compliance with diabetic diet. Current treatments include diet modification, weight loss.    Patient Active Problem List   Diagnosis    Anesthesia of skin    Carpal tunnel syndrome of right wrist    Uncontrolled type 2 diabetes mellitus with hyperglycemia (HCC)    Lesion of ulnar nerve    Carpal tunnel syndrome of left wrist    Paresthesia of skin    Diabetic polyneuropathy associated with diabetes mellitus due to underlying condition (HCC)    Onychomycosis    Loss of perception for smell    Gout    Stiffness of other specified joint, not elsewhere classified    Vitamin D deficiency    Other general symptoms and signs    Insomnia    Pain in left hand    Localized edema    Diabetes mellitus (HCC)    Muscle weakness (generalized)    Diabetes (HCC)    Other disturbances of skin sensation    ED (erectile dysfunction)    Scar conditions and fibrosis of skin    Entrapment of left ulnar nerve at elbow    Ulnar nerve entrapment at right

## 2023-09-22 DIAGNOSIS — E11.00 TYPE 2 DIABETES MELLITUS WITH HYPEROSMOLARITY WITHOUT NONKETOTIC HYPERGLYCEMIC-HYPEROSMOLAR COMA (NKHHC) (HCC): ICD-10-CM

## 2023-09-22 RX ORDER — EMPAGLIFLOZIN 10 MG/1
10 TABLET, FILM COATED ORAL DAILY
Qty: 90 TABLET | Refills: 1 | Status: SHIPPED | OUTPATIENT
Start: 2023-09-22

## 2023-09-22 NOTE — TELEPHONE ENCOUNTER
Requested Prescriptions     Pending Prescriptions Disp Refills    JARDIANCE 10 MG tablet [Pharmacy Med Name: JARDIANCE 10 MG TABLET] 90 tablet 1     Sig: TAKE 1 TABLET BY MOUTH EVERY DAY

## 2023-09-27 LAB
ALBUMIN SERPL-MCNC: 4.7 G/DL (ref 3.8–4.9)
ALBUMIN/CREAT UR: 425 MG/G CREAT (ref 0–29)
ALBUMIN/GLOB SERPL: 2 {RATIO} (ref 1.2–2.2)
ALP SERPL-CCNC: 85 IU/L (ref 44–121)
ALT SERPL-CCNC: 19 IU/L (ref 0–44)
AST SERPL-CCNC: 16 IU/L (ref 0–40)
BASOPHILS # BLD AUTO: 0.1 X10E3/UL (ref 0–0.2)
BASOPHILS NFR BLD AUTO: 1 %
BILIRUB SERPL-MCNC: 1.3 MG/DL (ref 0–1.2)
BUN SERPL-MCNC: 32 MG/DL (ref 6–24)
BUN/CREAT SERPL: 26 (ref 9–20)
CALCIUM SERPL-MCNC: 9.5 MG/DL (ref 8.7–10.2)
CHLORIDE SERPL-SCNC: 99 MMOL/L (ref 96–106)
CHOLEST SERPL-MCNC: 81 MG/DL (ref 100–199)
CO2 SERPL-SCNC: 24 MMOL/L (ref 20–29)
CREAT SERPL-MCNC: 1.22 MG/DL (ref 0.76–1.27)
CREAT UR-MCNC: 75.5 MG/DL
EGFRCR SERPLBLD CKD-EPI 2021: 69 ML/MIN/1.73
EOSINOPHIL # BLD AUTO: 0.3 X10E3/UL (ref 0–0.4)
EOSINOPHIL NFR BLD AUTO: 4 %
ERYTHROCYTE [DISTWIDTH] IN BLOOD BY AUTOMATED COUNT: 13.1 % (ref 11.6–15.4)
GLOBULIN SER CALC-MCNC: 2.4 G/DL (ref 1.5–4.5)
GLUCOSE SERPL-MCNC: 122 MG/DL (ref 70–99)
HBA1C MFR BLD: 7.5 % (ref 4.8–5.6)
HCT VFR BLD AUTO: 39.4 % (ref 37.5–51)
HDLC SERPL-MCNC: 37 MG/DL
HGB BLD-MCNC: 13.2 G/DL (ref 13–17.7)
IMM GRANULOCYTES # BLD AUTO: 0 X10E3/UL (ref 0–0.1)
IMM GRANULOCYTES NFR BLD AUTO: 0 %
LDLC SERPL CALC-MCNC: 17 MG/DL (ref 0–99)
LYMPHOCYTES # BLD AUTO: 1.8 X10E3/UL (ref 0.7–3.1)
LYMPHOCYTES NFR BLD AUTO: 26 %
MCH RBC QN AUTO: 30.4 PG (ref 26.6–33)
MCHC RBC AUTO-ENTMCNC: 33.5 G/DL (ref 31.5–35.7)
MCV RBC AUTO: 91 FL (ref 79–97)
MICROALBUMIN UR-MCNC: 320.7 UG/ML
MONOCYTES # BLD AUTO: 0.4 X10E3/UL (ref 0.1–0.9)
MONOCYTES NFR BLD AUTO: 6 %
NEUTROPHILS # BLD AUTO: 4.3 X10E3/UL (ref 1.4–7)
NEUTROPHILS NFR BLD AUTO: 63 %
PLATELET # BLD AUTO: 183 X10E3/UL (ref 150–450)
POTASSIUM SERPL-SCNC: 4.7 MMOL/L (ref 3.5–5.2)
PROT SERPL-MCNC: 7.1 G/DL (ref 6–8.5)
RBC # BLD AUTO: 4.34 X10E6/UL (ref 4.14–5.8)
SODIUM SERPL-SCNC: 140 MMOL/L (ref 134–144)
TRIGL SERPL-MCNC: 168 MG/DL (ref 0–149)
URATE SERPL-MCNC: 4 MG/DL (ref 3.8–8.4)
VLDLC SERPL CALC-MCNC: 27 MG/DL (ref 5–40)
WBC # BLD AUTO: 6.8 X10E3/UL (ref 3.4–10.8)

## 2023-09-29 ENCOUNTER — TELEPHONE (OUTPATIENT)
Age: 58
End: 2023-09-29

## 2023-09-29 ENCOUNTER — OFFICE VISIT (OUTPATIENT)
Facility: CLINIC | Age: 58
End: 2023-09-29
Payer: COMMERCIAL

## 2023-09-29 VITALS
DIASTOLIC BLOOD PRESSURE: 64 MMHG | HEART RATE: 84 BPM | BODY MASS INDEX: 27.99 KG/M2 | SYSTOLIC BLOOD PRESSURE: 117 MMHG | TEMPERATURE: 97.7 F | OXYGEN SATURATION: 98 % | WEIGHT: 211.2 LBS | HEIGHT: 73 IN

## 2023-09-29 DIAGNOSIS — B35.1 TOENAIL FUNGUS: ICD-10-CM

## 2023-09-29 DIAGNOSIS — E11.65 TYPE 2 DIABETES MELLITUS WITH HYPERGLYCEMIA, WITH LONG-TERM CURRENT USE OF INSULIN (HCC): ICD-10-CM

## 2023-09-29 DIAGNOSIS — Z79.4 TYPE 2 DIABETES MELLITUS WITH HYPERGLYCEMIA, WITH LONG-TERM CURRENT USE OF INSULIN (HCC): ICD-10-CM

## 2023-09-29 DIAGNOSIS — I10 ESSENTIAL (PRIMARY) HYPERTENSION: Primary | ICD-10-CM

## 2023-09-29 DIAGNOSIS — E78.5 HYPERLIPIDEMIA, UNSPECIFIED HYPERLIPIDEMIA TYPE: ICD-10-CM

## 2023-09-29 DIAGNOSIS — Z23 INFLUENZA VACCINE ADMINISTERED: ICD-10-CM

## 2023-09-29 PROCEDURE — 90471 IMMUNIZATION ADMIN: CPT | Performed by: NURSE PRACTITIONER

## 2023-09-29 PROCEDURE — 3074F SYST BP LT 130 MM HG: CPT | Performed by: NURSE PRACTITIONER

## 2023-09-29 PROCEDURE — 3051F HG A1C>EQUAL 7.0%<8.0%: CPT | Performed by: NURSE PRACTITIONER

## 2023-09-29 PROCEDURE — 90674 CCIIV4 VAC NO PRSV 0.5 ML IM: CPT | Performed by: NURSE PRACTITIONER

## 2023-09-29 PROCEDURE — 3078F DIAST BP <80 MM HG: CPT | Performed by: NURSE PRACTITIONER

## 2023-09-29 PROCEDURE — 99214 OFFICE O/P EST MOD 30 MIN: CPT | Performed by: NURSE PRACTITIONER

## 2023-09-29 SDOH — ECONOMIC STABILITY: HOUSING INSECURITY
IN THE LAST 12 MONTHS, WAS THERE A TIME WHEN YOU DID NOT HAVE A STEADY PLACE TO SLEEP OR SLEPT IN A SHELTER (INCLUDING NOW)?: NO

## 2023-09-29 SDOH — ECONOMIC STABILITY: FOOD INSECURITY: WITHIN THE PAST 12 MONTHS, YOU WORRIED THAT YOUR FOOD WOULD RUN OUT BEFORE YOU GOT MONEY TO BUY MORE.: NEVER TRUE

## 2023-09-29 SDOH — ECONOMIC STABILITY: FOOD INSECURITY: WITHIN THE PAST 12 MONTHS, THE FOOD YOU BOUGHT JUST DIDN'T LAST AND YOU DIDN'T HAVE MONEY TO GET MORE.: NEVER TRUE

## 2023-09-29 SDOH — ECONOMIC STABILITY: INCOME INSECURITY: HOW HARD IS IT FOR YOU TO PAY FOR THE VERY BASICS LIKE FOOD, HOUSING, MEDICAL CARE, AND HEATING?: NOT HARD AT ALL

## 2023-09-29 ASSESSMENT — PATIENT HEALTH QUESTIONNAIRE - PHQ9
SUM OF ALL RESPONSES TO PHQ QUESTIONS 1-9: 0
SUM OF ALL RESPONSES TO PHQ QUESTIONS 1-9: 0
1. LITTLE INTEREST OR PLEASURE IN DOING THINGS: 0
SUM OF ALL RESPONSES TO PHQ QUESTIONS 1-9: 0
SUM OF ALL RESPONSES TO PHQ9 QUESTIONS 1 & 2: 0
SUM OF ALL RESPONSES TO PHQ QUESTIONS 1-9: 0
2. FEELING DOWN, DEPRESSED OR HOPELESS: 0

## 2023-09-29 NOTE — PROGRESS NOTES
Subjective:   Jamal Mayen is a 62 y.o. male  established here with complaints of Follow-up   Toe fungus Reports have been doing epson salt soaks and right foot improved, appt with podiatry in 4 months. No fever,chills, pain. Patient presenting for hypertension and hyperlipidemia followup. Patient reports blood pressures at home most frequently 120/80 or less . Patient reports none Patient denies chest pain, shortness of breath, weakness, headaches, leg swelling, and palpitations. Patient reports  good compliance with medications and no side effects from medications noted. Current treatments include diet modification, weight loss. Patient presenting for Diabetes follow up. Patient reports none. Patient denies polyuria, polydipsia, polyphagia, and nocturia. Patient reports  good compliance with medications, no side effects from medications noted, and good compliance with diabetic diet. Current treatments include diabetic diet. Patient reports blood glucose most frequently  on home checks.      Patient Active Problem List   Diagnosis    Anesthesia of skin    Carpal tunnel syndrome of right wrist    Uncontrolled type 2 diabetes mellitus with hyperglycemia (HCC)    Lesion of ulnar nerve    Carpal tunnel syndrome of left wrist    Paresthesia of skin    Diabetic polyneuropathy associated with diabetes mellitus due to underlying condition (HCC)    Onychomycosis    Loss of perception for smell    Gout    Stiffness of other specified joint, not elsewhere classified    Vitamin D deficiency    Other general symptoms and signs    Insomnia    Pain in left hand    Localized edema    Diabetes mellitus (HCC)    Muscle weakness (generalized)    Diabetes (HCC)    Other disturbances of skin sensation    ED (erectile dysfunction)    Scar conditions and fibrosis of skin    Entrapment of left ulnar nerve at elbow    Ulnar nerve entrapment at right elbow    Type 2 diabetes mellitus (HCC)    Hypercholesterolemia    Positive

## 2023-10-01 RX ORDER — AMLODIPINE BESYLATE 10 MG/1
TABLET ORAL
Qty: 90 TABLET | Refills: 1 | Status: SHIPPED | OUTPATIENT
Start: 2023-10-01

## 2023-10-05 RX ORDER — CYCLOBENZAPRINE HCL 10 MG
10 TABLET ORAL 3 TIMES DAILY PRN
COMMUNITY
End: 2023-10-06 | Stop reason: SDUPTHER

## 2023-10-05 NOTE — TELEPHONE ENCOUNTER
Requested Prescriptions     Pending Prescriptions Disp Refills    cyclobenzaprine (FLEXERIL) 10 MG tablet       Sig: Take 1 tablet by mouth 3 times daily as needed

## 2023-10-06 RX ORDER — CYCLOBENZAPRINE HCL 10 MG
10 TABLET ORAL 3 TIMES DAILY PRN
Qty: 90 TABLET | Refills: 1 | Status: SHIPPED | OUTPATIENT
Start: 2023-10-06

## 2023-10-15 DIAGNOSIS — E11.65 TYPE 2 DIABETES MELLITUS WITH HYPERGLYCEMIA (HCC): ICD-10-CM

## 2023-10-16 NOTE — TELEPHONE ENCOUNTER
Requested Prescriptions     Pending Prescriptions Disp Refills    metFORMIN (GLUCOPHAGE-XR) 500 MG extended release tablet [Pharmacy Med Name: METFORMIN HCL  MG TABLET] 360 tablet 1     Sig: TAKE 2 TABLETS BY MOUTH BEFORE BREAKFAST AND EVENING MEAL EVERY DAY

## 2023-10-17 RX ORDER — METFORMIN HYDROCHLORIDE 500 MG/1
TABLET, EXTENDED RELEASE ORAL
Qty: 360 TABLET | Refills: 1 | Status: SHIPPED | OUTPATIENT
Start: 2023-10-17

## 2023-12-31 NOTE — TELEPHONE ENCOUNTER
Sotero Alcazar Jr., MD;  Location: Lemuel Shattuck Sotero Alcazar Jr., MD;  Location: Lemuel Shattuck Sadiq Roland MD;   Patient came by office to check on refill for Metformin said he is completely out of this medication Please call patient when this is done [phone] "Location: [facility] OR;  Service: OB/GYN;  Laterality: Bilateral;  715AM start oklaurie GomezJo    OOPHORECTOMY Right 10/18/2019    TONSILLECTOMY      TOTAL ABDOMINAL HYSTERECTOMY N/A 10/28/2019    Procedure: HYSTERECTOMY, TOTAL, ABDOMINAL;  Surgeon: Sadiq Roland MD;  Location: [facility] OR;  Service: OB/GYN;  Esplanade Ave  [address]  [facility] [address]  [phone]      [facility] - Emergency Dept Emergency Medicine Go to  As needed, If symptoms worsen [address] [facility] [address] [phone]          The patient/family communicates understanding of all this information and all remaining questions and concerns were addressed at this time.      Referrals:  No orders of the defined types were placed in this encounter.      CLINICAL IMPRESSION    ICD-10-CM ICD-9-CM   1. Constipation, unspecified constipation type  K59.00 564.00          ED Disposition Condition    Discharge Stable                 Yonatan Carrasco MD  12/31/23 9523

## 2024-02-21 ENCOUNTER — OFFICE VISIT (OUTPATIENT)
Age: 59
End: 2024-02-21
Payer: MEDICARE

## 2024-02-21 VITALS
WEIGHT: 211 LBS | BODY MASS INDEX: 27.96 KG/M2 | HEIGHT: 73 IN | SYSTOLIC BLOOD PRESSURE: 139 MMHG | DIASTOLIC BLOOD PRESSURE: 70 MMHG | RESPIRATION RATE: 16 BRPM

## 2024-02-21 DIAGNOSIS — E08.42 DIABETIC POLYNEUROPATHY ASSOCIATED WITH DIABETES MELLITUS DUE TO UNDERLYING CONDITION (HCC): Primary | ICD-10-CM

## 2024-02-21 PROCEDURE — 3078F DIAST BP <80 MM HG: CPT | Performed by: NURSE PRACTITIONER

## 2024-02-21 PROCEDURE — 99214 OFFICE O/P EST MOD 30 MIN: CPT | Performed by: NURSE PRACTITIONER

## 2024-02-21 PROCEDURE — 3075F SYST BP GE 130 - 139MM HG: CPT | Performed by: NURSE PRACTITIONER

## 2024-02-21 RX ORDER — ESZOPICLONE 3 MG/1
3 TABLET, FILM COATED ORAL NIGHTLY PRN
Qty: 30 TABLET | Refills: 5 | Status: SHIPPED | OUTPATIENT
Start: 2024-02-21 | End: 2024-03-22

## 2024-02-21 RX ORDER — SEMAGLUTIDE 1.34 MG/ML
INJECTION, SOLUTION SUBCUTANEOUS
COMMUNITY
Start: 2024-01-20

## 2024-02-21 RX ORDER — TOPIRAMATE 50 MG/1
50 TABLET, FILM COATED ORAL 2 TIMES DAILY
Qty: 60 TABLET | Refills: 3 | Status: SHIPPED | OUTPATIENT
Start: 2024-02-21

## 2024-02-21 NOTE — PROGRESS NOTES
Chief Complaint   Patient presents with    Neurologic Problem     Feet and hands     /70   Resp 16   Ht 1.854 m (6' 1\")   Wt 95.7 kg (211 lb)   BMI 27.84 kg/m²   1. Have you been to the ER, urgent care clinic since your last visit?  Hospitalized since your last visit?No    2. Have you seen or consulted any other health care providers outside of the [facility] since your last visit?  Include any pap smears or colon screening. No

## 2024-02-22 NOTE — PROGRESS NOTES
Korey Miller is a 58 y.o. male who presents with the following  Chief Complaint   Patient presents with    Neurologic Problem     Feet and hands       HPI    Patient comes in for follow-up for diabetic polyneuropathy  He is previously a patient of Dr. Guo   He has failed gabapentin, Lyrica, Cymbalta and Elavil  He was sent to pain management but did not have a good visit with Dr. Montoya's office here in [place]  He does want to see somebody to look at getting a spinal stimulator which she did talk to Dr. Guo about beforehand     He does not get good sleep  He is up all night with pain in his legs  Normally he runs at about a 7 out of 10 on the pain scale  Sometimes it can get as good as a 5 but mostly it hovers around 7  Nothing really else helps  He does have a lot of good questions about the neuropathy center in [place] and we did discuss this is worth trying  He is never used medical cannabis but is interested in doing something like this to  He is also interested in trying another medications as he has not been on Tegretol or Topamax  No open sores  He is on Ozempic for his diabetes and is trying to maintain this        Allergies   Allergen Reactions    Teicoplanin Rash       Current Outpatient Medications   Medication Sig Dispense Refill    OZEMPIC, 1 MG/DOSE, 4 MG/3ML SOPN INJECT 1MG SUBCUTANEOUSLY ONCE WEEKLY AS DIRECTED      eszopiclone 3 MG TABS Take 1 tablet by mouth nightly as needed (insomnia) for up to 30 days. Max Daily Amount: 3 mg 30 tablet 5    topiramate (TOPAMAX) 50 MG tablet Take 1 tablet by mouth 2 times daily 60 tablet 3    metFORMIN (GLUCOPHAGE-XR) 500 MG extended release tablet TAKE 2 TABLETS BY MOUTH BEFORE BREAKFAST AND EVENING MEAL EVERY  tablet 1    cyclobenzaprine (FLEXERIL) 10 MG tablet Take 1 tablet by mouth 3 times daily as needed for Muscle spasms 90 tablet 1    amLODIPine (NORVASC) 10 MG tablet TAKE 1 TABLET BY MOUTH EVERY DAY FOR HIGH BLOOD PRESSURE 90

## 2024-02-27 DIAGNOSIS — M1A.9XX0 CHRONIC GOUT WITHOUT TOPHUS, UNSPECIFIED CAUSE, UNSPECIFIED SITE: ICD-10-CM

## 2024-02-27 RX ORDER — ALLOPURINOL 300 MG/1
300 TABLET ORAL DAILY
Qty: 90 TABLET | Refills: 1 | Status: SHIPPED | OUTPATIENT
Start: 2024-02-27

## 2024-02-27 NOTE — TELEPHONE ENCOUNTER
Requested Prescriptions     Pending Prescriptions Disp Refills    allopurinol (ZYLOPRIM) 300 MG tablet [Pharmacy Med Name: ALLOPURINOL 300 MG TABLET] 90 tablet 1     Sig: TAKE 1 TABLET BY MOUTH EVERY DAY

## 2024-03-07 DIAGNOSIS — I10 ESSENTIAL (PRIMARY) HYPERTENSION: ICD-10-CM

## 2024-03-07 DIAGNOSIS — E11.65 TYPE 2 DIABETES MELLITUS WITH HYPERGLYCEMIA (HCC): ICD-10-CM

## 2024-03-07 RX ORDER — VALSARTAN AND HYDROCHLOROTHIAZIDE 320; 25 MG/1; MG/1
TABLET, FILM COATED ORAL
Qty: 90 TABLET | Refills: 1 | Status: SHIPPED | OUTPATIENT
Start: 2024-03-07

## 2024-03-07 RX ORDER — METFORMIN HYDROCHLORIDE 500 MG/1
TABLET, EXTENDED RELEASE ORAL
Qty: 360 TABLET | Refills: 1 | Status: SHIPPED | OUTPATIENT
Start: 2024-03-07

## 2024-03-07 NOTE — TELEPHONE ENCOUNTER
Requested Prescriptions     Pending Prescriptions Disp Refills    metFORMIN (GLUCOPHAGE-XR) 500 MG extended release tablet [Pharmacy Med Name: METFORMIN HCL  MG TABLET] 360 tablet 1     Sig: TAKE 2 TABLETS BY MOUTH BEFORE BREAKFAST AND EVENING MEAL EVERY DAY    valsartan-hydroCHLOROthiazide (DIOVAN-HCT) 320-25 MG per tablet [Pharmacy Med Name: VALSARTAN-HCTZ 320-25 MG TAB] 90 tablet 1     Sig: TAKE 1 TABLET BY MOUTH EVERY DAY FOR HIGH BLOOD PRESSURE

## 2024-03-20 ENCOUNTER — OFFICE VISIT (OUTPATIENT)
Facility: CLINIC | Age: 59
End: 2024-03-20
Payer: MEDICARE

## 2024-03-20 VITALS
SYSTOLIC BLOOD PRESSURE: 120 MMHG | BODY MASS INDEX: 29.42 KG/M2 | HEIGHT: 73 IN | DIASTOLIC BLOOD PRESSURE: 62 MMHG | WEIGHT: 222 LBS | OXYGEN SATURATION: 98 % | HEART RATE: 88 BPM | TEMPERATURE: 98.2 F

## 2024-03-20 DIAGNOSIS — H35.00 RETINOPATHY: Primary | ICD-10-CM

## 2024-03-20 DIAGNOSIS — E08.42 DIABETIC POLYNEUROPATHY ASSOCIATED WITH DIABETES MELLITUS DUE TO UNDERLYING CONDITION (HCC): ICD-10-CM

## 2024-03-20 DIAGNOSIS — M54.50 ACUTE MIDLINE LOW BACK PAIN WITHOUT SCIATICA: ICD-10-CM

## 2024-03-20 DIAGNOSIS — R09.89 RUNNY NOSE: ICD-10-CM

## 2024-03-20 PROCEDURE — 99214 OFFICE O/P EST MOD 30 MIN: CPT | Performed by: STUDENT IN AN ORGANIZED HEALTH CARE EDUCATION/TRAINING PROGRAM

## 2024-03-20 PROCEDURE — 3074F SYST BP LT 130 MM HG: CPT | Performed by: STUDENT IN AN ORGANIZED HEALTH CARE EDUCATION/TRAINING PROGRAM

## 2024-03-20 PROCEDURE — 3078F DIAST BP <80 MM HG: CPT | Performed by: STUDENT IN AN ORGANIZED HEALTH CARE EDUCATION/TRAINING PROGRAM

## 2024-03-20 RX ORDER — FLUTICASONE PROPIONATE 50 MCG
2 SPRAY, SUSPENSION (ML) NASAL DAILY
Qty: 16 G | Refills: 0 | Status: SHIPPED | OUTPATIENT
Start: 2024-03-20

## 2024-03-20 RX ORDER — TOPIRAMATE 50 MG/1
50 TABLET, FILM COATED ORAL 2 TIMES DAILY
Qty: 60 TABLET | Refills: 3 | Status: SHIPPED
Start: 2024-03-20 | End: 2024-03-20 | Stop reason: ALTCHOICE

## 2024-03-20 RX ORDER — ERGOCALCIFEROL 1.25 MG/1
CAPSULE ORAL
COMMUNITY

## 2024-03-20 RX ORDER — TOPIRAMATE 100 MG/1
100 TABLET, FILM COATED ORAL 2 TIMES DAILY
Qty: 60 TABLET | Refills: 3 | Status: SHIPPED | OUTPATIENT
Start: 2024-03-20

## 2024-03-20 ASSESSMENT — PATIENT HEALTH QUESTIONNAIRE - PHQ9
SUM OF ALL RESPONSES TO PHQ9 QUESTIONS 1 & 2: 0
1. LITTLE INTEREST OR PLEASURE IN DOING THINGS: NOT AT ALL
SUM OF ALL RESPONSES TO PHQ QUESTIONS 1-9: 0
2. FEELING DOWN, DEPRESSED OR HOPELESS: NOT AT ALL
SUM OF ALL RESPONSES TO PHQ QUESTIONS 1-9: 0

## 2024-03-20 NOTE — PROGRESS NOTES
Chief Complaint   Patient presents with    Medication Problem     \"Have you been to the ER, urgent care clinic since your last visit?  Hospitalized since your last visit?\"    NO    “Have you seen or consulted any other health care providers outside of [facility] since your last visit?”    NO      “Have you had a colorectal cancer screening such as a colonoscopy/FIT/Cologuard?    NO    No colonoscopy on file  No cologuard on file  No FIT/FOBT on file   No flexible sigmoidoscopy on file     /62 (Site: Left Upper Arm, Position: Sitting, Cuff Size: Large Adult)   Pulse 88   Temp 98.2 °F (36.8 °C) (Temporal)   Ht 1.854 m (6' 1\")   Wt 100.7 kg (222 lb)   SpO2 98%   BMI 29.29 kg/m²       Click Here for Release of Records Request    
([facility])    Patient given referral as needed to complete his injections with ophthalmology    Discussed with patient that runny nose is likely secondary to seasonal allergies.  To be discussed Flonase can take some time to work and must be taken consistently.    Patient has a referral in place already for Dr Eng who is the specialist for possible spine stimulator that Dr. Martins referred him to    Dr Cummins has given certificate for medical marijuana but has not picked up yet    Patient denies any noticeable change with being on Topamax 50 mg twice daily.  He is agreeable to trialing increase the dose.  Will recommend increase by 50 mg.  Increase to 100 mg in a.m. and 50 mg in p.m. for 1 week then increase to 100 mg twice daily.    Reviewed: Labs, Current Medications, and Specialty Notes    Aspects of this note have been generated using voice recognition software. Despite editing, there may be some syntax errors.    Medina Perry, DO

## 2024-04-12 DIAGNOSIS — R09.89 RUNNY NOSE: ICD-10-CM

## 2024-04-12 DIAGNOSIS — M54.50 ACUTE MIDLINE LOW BACK PAIN WITHOUT SCIATICA: ICD-10-CM

## 2024-04-12 RX ORDER — FLUTICASONE PROPIONATE 50 MCG
2 SPRAY, SUSPENSION (ML) NASAL DAILY
Qty: 16 G | Refills: 0 | Status: SHIPPED | OUTPATIENT
Start: 2024-04-12

## 2024-04-12 RX ORDER — TOPIRAMATE 100 MG/1
100 TABLET, FILM COATED ORAL 2 TIMES DAILY
Qty: 60 TABLET | Refills: 3 | Status: SHIPPED | OUTPATIENT
Start: 2024-04-12

## 2024-04-12 NOTE — TELEPHONE ENCOUNTER
Requested Prescriptions     Pending Prescriptions Disp Refills    fluticasone (FLONASE) 50 MCG/ACT nasal spray 16 g 0     Si sprays by Each Nostril route daily    topiramate (TOPAMAX) 100 MG tablet 60 tablet 3     Sig: Take 1 tablet by mouth 2 times daily

## 2024-05-22 DIAGNOSIS — E78.5 HYPERLIPIDEMIA, UNSPECIFIED HYPERLIPIDEMIA TYPE: ICD-10-CM

## 2024-05-22 RX ORDER — ROSUVASTATIN CALCIUM 20 MG/1
20 TABLET, COATED ORAL NIGHTLY
Qty: 90 TABLET | Refills: 1 | Status: SHIPPED | OUTPATIENT
Start: 2024-05-22

## 2024-05-22 NOTE — TELEPHONE ENCOUNTER
Requested Prescriptions     Pending Prescriptions Disp Refills    rosuvastatin (CRESTOR) 20 MG tablet [Pharmacy Med Name: ROSUVASTATIN CALCIUM 20 MG TAB] 90 tablet 1     Sig: TAKE 1 TABLET BY MOUTH EVERY DAY AT NIGHT

## 2024-06-03 DIAGNOSIS — I10 ESSENTIAL (PRIMARY) HYPERTENSION: ICD-10-CM

## 2024-06-03 RX ORDER — AMLODIPINE BESYLATE 10 MG/1
TABLET ORAL
Qty: 90 TABLET | Refills: 1 | Status: SHIPPED | OUTPATIENT
Start: 2024-06-03

## 2024-06-03 NOTE — TELEPHONE ENCOUNTER
Requested Prescriptions     Pending Prescriptions Disp Refills    amLODIPine (NORVASC) 10 MG tablet [Pharmacy Med Name: AMLODIPINE BESYLATE 10 MG TAB] 90 tablet 1     Sig: TAKE 1 TABLET BY MOUTH EVERY DAY FOR HIGH BLOOD PRESSURE

## 2024-06-04 ENCOUNTER — OFFICE VISIT (OUTPATIENT)
Facility: CLINIC | Age: 59
End: 2024-06-04
Payer: MEDICARE

## 2024-06-04 VITALS
RESPIRATION RATE: 14 BRPM | OXYGEN SATURATION: 97 % | TEMPERATURE: 97.8 F | WEIGHT: 209 LBS | HEIGHT: 73 IN | DIASTOLIC BLOOD PRESSURE: 70 MMHG | HEART RATE: 78 BPM | SYSTOLIC BLOOD PRESSURE: 131 MMHG | BODY MASS INDEX: 27.7 KG/M2

## 2024-06-04 DIAGNOSIS — Z11.4 SCREENING FOR HIV WITHOUT PRESENCE OF RISK FACTORS: ICD-10-CM

## 2024-06-04 DIAGNOSIS — E55.9 VITAMIN D DEFICIENCY: ICD-10-CM

## 2024-06-04 DIAGNOSIS — E78.5 HYPERLIPIDEMIA, UNSPECIFIED HYPERLIPIDEMIA TYPE: ICD-10-CM

## 2024-06-04 DIAGNOSIS — I10 ESSENTIAL (PRIMARY) HYPERTENSION: ICD-10-CM

## 2024-06-04 DIAGNOSIS — G47.00 INSOMNIA, UNSPECIFIED TYPE: ICD-10-CM

## 2024-06-04 DIAGNOSIS — E11.65 UNCONTROLLED TYPE 2 DIABETES MELLITUS WITH HYPERGLYCEMIA (HCC): ICD-10-CM

## 2024-06-04 DIAGNOSIS — Z00.00 MEDICARE ANNUAL WELLNESS VISIT, SUBSEQUENT: Primary | ICD-10-CM

## 2024-06-04 PROCEDURE — 3075F SYST BP GE 130 - 139MM HG: CPT | Performed by: NURSE PRACTITIONER

## 2024-06-04 PROCEDURE — 99213 OFFICE O/P EST LOW 20 MIN: CPT | Performed by: NURSE PRACTITIONER

## 2024-06-04 PROCEDURE — G0439 PPPS, SUBSEQ VISIT: HCPCS | Performed by: NURSE PRACTITIONER

## 2024-06-04 PROCEDURE — 3078F DIAST BP <80 MM HG: CPT | Performed by: NURSE PRACTITIONER

## 2024-06-04 RX ORDER — EMPAGLIFLOZIN 25 MG/1
TABLET, FILM COATED ORAL
COMMUNITY
Start: 2024-05-20

## 2024-06-04 RX ORDER — ROSUVASTATIN CALCIUM 20 MG/1
20 TABLET, COATED ORAL NIGHTLY
Qty: 90 TABLET | Refills: 1 | Status: SHIPPED | OUTPATIENT
Start: 2024-06-04

## 2024-06-04 ASSESSMENT — PATIENT HEALTH QUESTIONNAIRE - PHQ9
3. TROUBLE FALLING OR STAYING ASLEEP: NEARLY EVERY DAY
SUM OF ALL RESPONSES TO PHQ QUESTIONS 1-9: 9
9. THOUGHTS THAT YOU WOULD BE BETTER OFF DEAD, OR OF HURTING YOURSELF: NOT AT ALL
7. TROUBLE CONCENTRATING ON THINGS, SUCH AS READING THE NEWSPAPER OR WATCHING TELEVISION: NOT AT ALL
SUM OF ALL RESPONSES TO PHQ QUESTIONS 1-9: 9
6. FEELING BAD ABOUT YOURSELF - OR THAT YOU ARE A FAILURE OR HAVE LET YOURSELF OR YOUR FAMILY DOWN: NOT AT ALL
4. FEELING TIRED OR HAVING LITTLE ENERGY: NOT AT ALL
2. FEELING DOWN, DEPRESSED OR HOPELESS: NEARLY EVERY DAY
1. LITTLE INTEREST OR PLEASURE IN DOING THINGS: NEARLY EVERY DAY
8. MOVING OR SPEAKING SO SLOWLY THAT OTHER PEOPLE COULD HAVE NOTICED. OR THE OPPOSITE, BEING SO FIGETY OR RESTLESS THAT YOU HAVE BEEN MOVING AROUND A LOT MORE THAN USUAL: NOT AT ALL
SUM OF ALL RESPONSES TO PHQ QUESTIONS 1-9: 9
SUM OF ALL RESPONSES TO PHQ QUESTIONS 1-9: 9
SUM OF ALL RESPONSES TO PHQ9 QUESTIONS 1 & 2: 6
10. IF YOU CHECKED OFF ANY PROBLEMS, HOW DIFFICULT HAVE THESE PROBLEMS MADE IT FOR YOU TO DO YOUR WORK, TAKE CARE OF THINGS AT HOME, OR GET ALONG WITH OTHER PEOPLE: SOMEWHAT DIFFICULT
5. POOR APPETITE OR OVEREATING: NOT AT ALL

## 2024-06-04 ASSESSMENT — LIFESTYLE VARIABLES
HOW OFTEN DO YOU HAVE A DRINK CONTAINING ALCOHOL: NEVER
HOW MANY STANDARD DRINKS CONTAINING ALCOHOL DO YOU HAVE ON A TYPICAL DAY: PATIENT DOES NOT DRINK

## 2024-06-04 NOTE — PROGRESS NOTES
Medicare Annual Wellness Visit    Korey Miller is here for Medicare AWV and Immunizations    Assessment & Plan   Medicare annual wellness visit, subsequent  Screening for HIV without presence of risk factors  -     HIV 1/2 Ag/Ab, 4TH Generation,W Rflx Confirm  Essential (primary) hypertension  -     Lipid Panel  -     Comprehensive Metabolic Panel  -     CBC with Auto Differential  Uncontrolled type 2 diabetes mellitus with hyperglycemia (HCC)  -     Lipid Panel  -     Comprehensive Metabolic Panel  -     CBC with Auto Differential  Vitamin D deficiency  Insomnia, unspecified type  Hyperlipidemia, unspecified hyperlipidemia type  -     rosuvastatin (CRESTOR) 20 MG tablet; Take 1 tablet by mouth nightly, Disp-90 tablet, R-1Normal  Recommendations for Preventive Services Due: see orders and patient instructions/AVS.  Recommended screening schedule for the next 5-10 years is provided to the patient in written form: see Patient Instructions/AVS.     Return in about 3 months (around 9/4/2024) for DM, HLD, HTN , Ozempic 2mg.     Subjective   The following acute and/or chronic problems were also addressed today:  Will start with ozempic 2 mg once finiched with current 1 mg dose pen.   Patient's complete Health Risk Assessment and screening values have been reviewed and are found in Flowsheets. The following problems were reviewed today and where indicated follow up appointments were made and/or referrals ordered.        Positive Risk Factor Screenings with Interventions:    Fall Risk:  Do you feel unsteady or are you worried about falling? : no  2 or more falls in past year?: (!) yes  Fall with injury in past year?: no     Interventions:    Reviewed medications, home hazards, visual acuity, and co-morbidities that can increase risk for falls     Depression:  PHQ-2 Score: 6  PHQ-9 Total Score: 9    Interpretation:  5-9 mild   10-14 moderate   15-19 moderately severe   20-27 severe     Interventions:  Life style changes to

## 2024-06-04 NOTE — PROGRESS NOTES
Korey Miller is a 58 y.o. male presenting for Medicare AWV and Immunizations      /70 (Site: Right Upper Arm, Position: Sitting, Cuff Size: Large Adult)   Pulse 78   Temp 97.8 °F (36.6 °C) (Temporal)   Resp 14   Ht 1.854 m (6' 1\")   Wt 94.8 kg (209 lb)   SpO2 97%   BMI 27.57 kg/m²       Current Outpatient Medications   Medication Sig Dispense Refill    JARDIANCE 25 MG tablet TAKE 1 TABLET BY MOUTH EVERY DAY IN THE MORNING FOR 90 DAYS      amLODIPine (NORVASC) 10 MG tablet TAKE 1 TABLET BY MOUTH EVERY DAY FOR HIGH BLOOD PRESSURE 90 tablet 1    rosuvastatin (CRESTOR) 20 MG tablet TAKE 1 TABLET BY MOUTH EVERY DAY AT NIGHT 90 tablet 1    metFORMIN (GLUCOPHAGE-XR) 500 MG extended release tablet TAKE 2 TABLETS BY MOUTH BEFORE BREAKFAST AND EVENING MEAL EVERY  tablet 1    valsartan-hydroCHLOROthiazide (DIOVAN-HCT) 320-25 MG per tablet TAKE 1 TABLET BY MOUTH EVERY DAY FOR HIGH BLOOD PRESSURE 90 tablet 1    allopurinol (ZYLOPRIM) 300 MG tablet TAKE 1 TABLET BY MOUTH EVERY DAY 90 tablet 1    OZEMPIC, 1 MG/DOSE, 4 MG/3ML SOPN INJECT 1MG SUBCUTANEOUSLY ONCE WEEKLY AS DIRECTED      aspirin 81 MG EC tablet Take 1 tablet by mouth daily      fluticasone (FLONASE) 50 MCG/ACT nasal spray 2 sprays by Each Nostril route daily (Patient not taking: Reported on 6/4/2024) 16 g 0    topiramate (TOPAMAX) 100 MG tablet Take 1 tablet by mouth 2 times daily (Patient not taking: Reported on 6/4/2024) 60 tablet 3    vitamin D (ERGOCALCIFEROL) 1.25 MG (00767 UT) CAPS capsule  (Patient not taking: Reported on 6/4/2024)      cyclobenzaprine (FLEXERIL) 10 MG tablet Take 1 tablet by mouth 3 times daily as needed for Muscle spasms (Patient not taking: Reported on 6/4/2024) 90 tablet 1    JARDIANCE 10 MG tablet TAKE 1 TABLET BY MOUTH EVERY DAY (Patient not taking: Reported on 6/4/2024) 90 tablet 1    amitriptyline (ELAVIL) 25 MG tablet Take 1 tablet by mouth nightly (Patient not taking: Reported on 3/20/2024) 90 tablet 1

## 2024-06-06 LAB
ALBUMIN SERPL-MCNC: 4.2 G/DL (ref 3.8–4.9)
ALBUMIN/GLOB SERPL: 1.6 {RATIO} (ref 1.2–2.2)
ALP SERPL-CCNC: 89 IU/L (ref 44–121)
ALT SERPL-CCNC: 20 IU/L (ref 0–44)
AST SERPL-CCNC: 13 IU/L (ref 0–40)
BASOPHILS # BLD AUTO: 0 X10E3/UL (ref 0–0.2)
BASOPHILS NFR BLD AUTO: 1 %
BILIRUB SERPL-MCNC: 0.9 MG/DL (ref 0–1.2)
BUN SERPL-MCNC: 30 MG/DL (ref 6–24)
BUN/CREAT SERPL: 19 (ref 9–20)
CALCIUM SERPL-MCNC: 9.7 MG/DL (ref 8.7–10.2)
CHLORIDE SERPL-SCNC: 104 MMOL/L (ref 96–106)
CHOLEST SERPL-MCNC: 73 MG/DL (ref 100–199)
CO2 SERPL-SCNC: 26 MMOL/L (ref 20–29)
CREAT SERPL-MCNC: 1.55 MG/DL (ref 0.76–1.27)
EGFRCR SERPLBLD CKD-EPI 2021: 52 ML/MIN/1.73
EOSINOPHIL # BLD AUTO: 0.2 X10E3/UL (ref 0–0.4)
EOSINOPHIL NFR BLD AUTO: 4 %
ERYTHROCYTE [DISTWIDTH] IN BLOOD BY AUTOMATED COUNT: 13.2 % (ref 11.6–15.4)
GLOBULIN SER CALC-MCNC: 2.6 G/DL (ref 1.5–4.5)
GLUCOSE SERPL-MCNC: 154 MG/DL (ref 70–99)
HCT VFR BLD AUTO: 35.1 % (ref 37.5–51)
HGB BLD-MCNC: 11.4 G/DL (ref 13–17.7)
HIV 1+2 AB+HIV1 P24 AG SERPL QL IA: NON REACTIVE
IMM GRANULOCYTES # BLD AUTO: 0 X10E3/UL (ref 0–0.1)
IMM GRANULOCYTES NFR BLD AUTO: 0 %
LDLC SERPL CALC-MCNC: 18 MG/DL (ref 0–99)
LYMPHOCYTES # BLD AUTO: 1.7 X10E3/UL (ref 0.7–3.1)
LYMPHOCYTES NFR BLD AUTO: 28 %
MCH RBC QN AUTO: 30.6 PG (ref 26.6–33)
MCHC RBC AUTO-ENTMCNC: 32.5 G/DL (ref 31.5–35.7)
MCV RBC AUTO: 94 FL (ref 79–97)
MONOCYTES # BLD AUTO: 0.4 X10E3/UL (ref 0.1–0.9)
MONOCYTES NFR BLD AUTO: 7 %
NEUTROPHILS # BLD AUTO: 3.8 X10E3/UL (ref 1.4–7)
NEUTROPHILS NFR BLD AUTO: 60 %
PLATELET # BLD AUTO: 210 X10E3/UL (ref 150–450)
POTASSIUM SERPL-SCNC: 4.3 MMOL/L (ref 3.5–5.2)
PROT SERPL-MCNC: 6.8 G/DL (ref 6–8.5)
RBC # BLD AUTO: 3.72 X10E6/UL (ref 4.14–5.8)
SODIUM SERPL-SCNC: 143 MMOL/L (ref 134–144)
TRIGL SERPL-MCNC: 116 MG/DL (ref 0–149)
VLDLC SERPL CALC-MCNC: 21 MG/DL (ref 5–40)
WBC # BLD AUTO: 6.2 X10E3/UL (ref 3.4–10.8)

## 2024-06-10 ENCOUNTER — TELEPHONE (OUTPATIENT)
Facility: CLINIC | Age: 59
End: 2024-06-10

## 2024-06-10 NOTE — TELEPHONE ENCOUNTER
Patient brought in Long Term Disability Form ([employer]) for Nan Perez to fill out.  Explained needed an appointment so patient scheduled an appointment for 6/28/2024.  Gave form to clinical staff.

## 2024-06-17 NOTE — TELEPHONE ENCOUNTER
Per 3/20/2024 PCP office note- they adjusted the Topamax dose that you rx'ed for pt on 2/21/24.    \"Says that he saw \"Dr Amaya\" recently. Has follow up august  Says he gave two medications to try \"ec\" something and can't remember what other one was \"for neuropathy pain\"   Per note was Started on TOPAMAX BID and Ameliatient denies any noticeable change with being on Topamax 50 mg twice daily. He is agreeable to trialing increase the dose. Will recommend increase by 50 mg. Increase to 100 mg in a.m. and 50 mg in p.m. for 1 week then increase to 100 mg twice daily.\"

## 2024-06-18 RX ORDER — TOPIRAMATE 50 MG/1
50 TABLET, FILM COATED ORAL 2 TIMES DAILY
Qty: 180 TABLET | Refills: 1 | Status: SHIPPED | OUTPATIENT
Start: 2024-06-18 | End: 2024-06-18 | Stop reason: SDUPTHER

## 2024-06-18 RX ORDER — TOPIRAMATE 50 MG/1
100 TABLET, FILM COATED ORAL 2 TIMES DAILY
Qty: 180 TABLET | Refills: 1 | Status: SHIPPED | OUTPATIENT
Start: 2024-06-18

## 2024-06-28 ENCOUNTER — OFFICE VISIT (OUTPATIENT)
Facility: CLINIC | Age: 59
End: 2024-06-28
Payer: MEDICARE

## 2024-06-28 VITALS
WEIGHT: 209 LBS | TEMPERATURE: 98.6 F | DIASTOLIC BLOOD PRESSURE: 87 MMHG | RESPIRATION RATE: 16 BRPM | BODY MASS INDEX: 27.7 KG/M2 | HEART RATE: 83 BPM | HEIGHT: 73 IN | SYSTOLIC BLOOD PRESSURE: 126 MMHG | OXYGEN SATURATION: 98 %

## 2024-06-28 DIAGNOSIS — G25.81 RLS (RESTLESS LEGS SYNDROME): ICD-10-CM

## 2024-06-28 DIAGNOSIS — E78.00 HYPERCHOLESTEROLEMIA: ICD-10-CM

## 2024-06-28 DIAGNOSIS — E11.65 UNCONTROLLED TYPE 2 DIABETES MELLITUS WITH HYPERGLYCEMIA (HCC): ICD-10-CM

## 2024-06-28 DIAGNOSIS — I10 ESSENTIAL (PRIMARY) HYPERTENSION: Primary | ICD-10-CM

## 2024-06-28 PROCEDURE — 3079F DIAST BP 80-89 MM HG: CPT | Performed by: NURSE PRACTITIONER

## 2024-06-28 PROCEDURE — 3074F SYST BP LT 130 MM HG: CPT | Performed by: NURSE PRACTITIONER

## 2024-06-28 PROCEDURE — 99214 OFFICE O/P EST MOD 30 MIN: CPT | Performed by: NURSE PRACTITIONER

## 2024-06-28 RX ORDER — TIZANIDINE 2 MG/1
2 TABLET ORAL NIGHTLY
Qty: 30 TABLET | Refills: 0 | Status: SHIPPED | OUTPATIENT
Start: 2024-06-28

## 2024-06-28 NOTE — PROGRESS NOTES
Chief Complaint   Patient presents with    Forms     /87   Pulse 83   Temp 98.6 °F (37 °C)   Resp 16   Ht 1.854 m (6' 1\")   Wt 94.8 kg (209 lb)   SpO2 98%   BMI 27.57 kg/m²   \"Have you been to the ER, urgent care clinic since your last visit?  Hospitalized since your last visit?\"    NO    “Have you seen or consulted any other health care providers outside of [facility] since your last visit?”    NO          Click Here for Release of Records Request

## 2024-06-28 NOTE — PROGRESS NOTES
[facility]  [address] [place] [address] [address]   [place] [address]  (P) [phone]   (F) [phone]    Subjective:   Korey Miller is a 58 y.o. male  established here with complaints of Forms  Patient presenting for hypertension, hyperlipidemia, and diabetes followup. Endo stopped metformin and will continue B12 and multivitamin. Also suffering from RLS secondary to neuropathy Patient reports blood pressures at home most frequently between 120/80 and 130/88 . Patient reports no new symptoms Patient denies chest pain, shortness of breath, weakness, leg swelling, polyuria, and nocturia. Patient reports  good compliance with medications, no side effects from medications noted, and good compliance with diabetic diet. Current treatments include diet modification, weight loss.     Patient Active Problem List   Diagnosis    Anesthesia of skin    Carpal tunnel syndrome of right wrist    Uncontrolled type 2 diabetes mellitus with hyperglycemia (HCC)    Lesion of ulnar nerve    Carpal tunnel syndrome of left wrist    Paresthesia of skin    Diabetic polyneuropathy associated with diabetes mellitus due to underlying condition (HCC)    Onychomycosis    Loss of perception for smell    Gout    Stiffness of other specified joint, not elsewhere classified    Vitamin D deficiency    Other general symptoms and signs    Insomnia    Pain in left hand    Localized edema    Diabetes mellitus (HCC)    Muscle weakness (generalized)    Diabetes (HCC)    Other disturbances of skin sensation    ED (erectile dysfunction)    Scar conditions and fibrosis of skin    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

## 2024-07-20 DIAGNOSIS — M1A.9XX0 CHRONIC GOUT WITHOUT TOPHUS, UNSPECIFIED CAUSE, UNSPECIFIED SITE: ICD-10-CM

## 2024-07-21 DIAGNOSIS — G25.81 RLS (RESTLESS LEGS SYNDROME): ICD-10-CM

## 2024-07-22 NOTE — TELEPHONE ENCOUNTER
Requested Prescriptions     Pending Prescriptions Disp Refills    tiZANidine (ZANAFLEX) 2 MG tablet [Pharmacy Med Name: TIZANIDINE HCL 2 MG TABLET] 90 tablet 1     Sig: TAKE 1 TABLET BY MOUTH AT BEDTIME FOR NIGHT CRAMPS

## 2024-07-23 RX ORDER — TIZANIDINE 2 MG/1
2 TABLET ORAL NIGHTLY
Qty: 90 TABLET | Refills: 1 | Status: SHIPPED | OUTPATIENT
Start: 2024-07-23

## 2024-07-23 RX ORDER — ALLOPURINOL 300 MG/1
300 TABLET ORAL DAILY
Qty: 90 TABLET | Refills: 1 | Status: SHIPPED | OUTPATIENT
Start: 2024-07-23

## 2024-08-21 ENCOUNTER — OFFICE VISIT (OUTPATIENT)
Age: 59
End: 2024-08-21

## 2024-08-21 VITALS
DIASTOLIC BLOOD PRESSURE: 74 MMHG | SYSTOLIC BLOOD PRESSURE: 124 MMHG | OXYGEN SATURATION: 98 % | RESPIRATION RATE: 16 BRPM | HEART RATE: 84 BPM

## 2024-08-21 DIAGNOSIS — R20.2 NUMBNESS AND TINGLING: ICD-10-CM

## 2024-08-21 DIAGNOSIS — E08.42 DIABETIC POLYNEUROPATHY ASSOCIATED WITH DIABETES MELLITUS DUE TO UNDERLYING CONDITION (HCC): Primary | ICD-10-CM

## 2024-08-21 DIAGNOSIS — R20.0 NUMBNESS AND TINGLING: ICD-10-CM

## 2024-08-21 DIAGNOSIS — G62.9 POLYNEUROPATHY: ICD-10-CM

## 2024-08-21 RX ORDER — AMITRIPTYLINE HYDROCHLORIDE 100 MG/1
100 TABLET ORAL NIGHTLY
Qty: 30 TABLET | Refills: 5 | Status: SHIPPED | OUTPATIENT
Start: 2024-08-21

## 2024-08-22 NOTE — PROGRESS NOTES
Component Value Date    SHAHNAZ Positive (A) 08/18/2021                1. Diabetic polyneuropathy associated with diabetes mellitus due to underlying condition (HCC)     Continue Elavil 100 mg   Can increase if no changes in pain within the next few weeks.   Consider Tegretol if needed.   Keep track of symptoms.   Using cannabis if appropriate.   Stress higher, will get better.   Get in with  to look at stimulator       This note was created using voice recognition software. Despite editing, there may be syntax errors.

## 2024-09-03 ENCOUNTER — OFFICE VISIT (OUTPATIENT)
Facility: CLINIC | Age: 59
End: 2024-09-03

## 2024-09-03 VITALS
HEART RATE: 89 BPM | HEIGHT: 73 IN | OXYGEN SATURATION: 98 % | RESPIRATION RATE: 18 BRPM | WEIGHT: 215.8 LBS | DIASTOLIC BLOOD PRESSURE: 79 MMHG | TEMPERATURE: 98.2 F | SYSTOLIC BLOOD PRESSURE: 136 MMHG | BODY MASS INDEX: 28.6 KG/M2

## 2024-09-03 DIAGNOSIS — E78.5 HYPERLIPIDEMIA, UNSPECIFIED HYPERLIPIDEMIA TYPE: ICD-10-CM

## 2024-09-03 DIAGNOSIS — G25.81 RLS (RESTLESS LEGS SYNDROME): ICD-10-CM

## 2024-09-03 DIAGNOSIS — Z23 NEEDS FLU SHOT: Primary | ICD-10-CM

## 2024-09-03 DIAGNOSIS — I10 ESSENTIAL (PRIMARY) HYPERTENSION: ICD-10-CM

## 2024-09-03 DIAGNOSIS — E11.65 UNCONTROLLED TYPE 2 DIABETES MELLITUS WITH HYPERGLYCEMIA (HCC): ICD-10-CM

## 2024-09-03 DIAGNOSIS — E78.00 HYPERCHOLESTEROLEMIA: ICD-10-CM

## 2024-09-03 RX ORDER — VALSARTAN AND HYDROCHLOROTHIAZIDE 320; 25 MG/1; MG/1
1 TABLET, FILM COATED ORAL DAILY
Qty: 90 TABLET | Refills: 3 | Status: SHIPPED | OUTPATIENT
Start: 2024-09-03

## 2024-09-03 RX ORDER — TIZANIDINE 2 MG/1
4 TABLET ORAL NIGHTLY
Qty: 180 TABLET | Refills: 3 | Status: SHIPPED | OUTPATIENT
Start: 2024-09-03

## 2024-09-03 RX ORDER — ROSUVASTATIN CALCIUM 20 MG/1
20 TABLET, COATED ORAL NIGHTLY
Qty: 90 TABLET | Refills: 3 | Status: SHIPPED | OUTPATIENT
Start: 2024-09-03

## 2024-09-03 RX ORDER — SILDENAFIL 100 MG/1
1 TABLET, FILM COATED ORAL DAILY
COMMUNITY
Start: 2019-07-23

## 2024-09-03 ASSESSMENT — PATIENT HEALTH QUESTIONNAIRE - PHQ9
4. FEELING TIRED OR HAVING LITTLE ENERGY: NOT AT ALL
6. FEELING BAD ABOUT YOURSELF - OR THAT YOU ARE A FAILURE OR HAVE LET YOURSELF OR YOUR FAMILY DOWN: NOT AT ALL
5. POOR APPETITE OR OVEREATING: NOT AT ALL
SUM OF ALL RESPONSES TO PHQ9 QUESTIONS 1 & 2: 6
8. MOVING OR SPEAKING SO SLOWLY THAT OTHER PEOPLE COULD HAVE NOTICED. OR THE OPPOSITE, BEING SO FIGETY OR RESTLESS THAT YOU HAVE BEEN MOVING AROUND A LOT MORE THAN USUAL: NOT AT ALL
SUM OF ALL RESPONSES TO PHQ QUESTIONS 1-9: 9
7. TROUBLE CONCENTRATING ON THINGS, SUCH AS READING THE NEWSPAPER OR WATCHING TELEVISION: NOT AT ALL
SUM OF ALL RESPONSES TO PHQ QUESTIONS 1-9: 9
2. FEELING DOWN, DEPRESSED OR HOPELESS: NEARLY EVERY DAY
9. THOUGHTS THAT YOU WOULD BE BETTER OFF DEAD, OR OF HURTING YOURSELF: NOT AT ALL
1. LITTLE INTEREST OR PLEASURE IN DOING THINGS: NEARLY EVERY DAY
10. IF YOU CHECKED OFF ANY PROBLEMS, HOW DIFFICULT HAVE THESE PROBLEMS MADE IT FOR YOU TO DO YOUR WORK, TAKE CARE OF THINGS AT HOME, OR GET ALONG WITH OTHER PEOPLE: NOT DIFFICULT AT ALL
3. TROUBLE FALLING OR STAYING ASLEEP: NEARLY EVERY DAY

## 2024-09-16 RX ORDER — AMITRIPTYLINE HYDROCHLORIDE 100 MG/1
100 TABLET ORAL NIGHTLY
Qty: 90 TABLET | Refills: 2 | Status: SHIPPED | OUTPATIENT
Start: 2024-09-16

## 2024-09-17 LAB — HBA1C MFR BLD HPLC: 8.2 %

## 2024-10-18 DIAGNOSIS — I10 ESSENTIAL (PRIMARY) HYPERTENSION: ICD-10-CM

## 2024-10-21 RX ORDER — AMLODIPINE BESYLATE 10 MG/1
TABLET ORAL
Qty: 90 TABLET | Refills: 1 | Status: SHIPPED | OUTPATIENT
Start: 2024-10-21

## 2024-10-21 NOTE — TELEPHONE ENCOUNTER
Requested Prescriptions     Pending Prescriptions Disp Refills    amLODIPine (NORVASC) 10 MG tablet [Pharmacy Med Name: AMLODIPINE BESYLATE 10 MG TAB] 90 tablet 1     Sig: TAKE 1 TABLET BY MOUTH EVERY DAY FOR HIGH BLOOD PRESSURE     Last ov:09/3/2024  Next ov:03/04/2024  Last rx:06/3/2024  Last lab: 06/05/2024

## 2024-11-04 ENCOUNTER — OFFICE VISIT (OUTPATIENT)
Facility: CLINIC | Age: 59
End: 2024-11-04
Payer: MEDICARE

## 2024-11-04 VITALS
SYSTOLIC BLOOD PRESSURE: 142 MMHG | TEMPERATURE: 98.7 F | BODY MASS INDEX: 29.69 KG/M2 | RESPIRATION RATE: 18 BRPM | OXYGEN SATURATION: 97 % | HEART RATE: 87 BPM | DIASTOLIC BLOOD PRESSURE: 78 MMHG | WEIGHT: 224 LBS | HEIGHT: 73 IN

## 2024-11-04 DIAGNOSIS — L20.9 ATOPIC DERMATITIS, UNSPECIFIED TYPE: Primary | ICD-10-CM

## 2024-11-04 PROCEDURE — 99214 OFFICE O/P EST MOD 30 MIN: CPT

## 2024-11-04 PROCEDURE — 3077F SYST BP >= 140 MM HG: CPT

## 2024-11-04 PROCEDURE — 3078F DIAST BP <80 MM HG: CPT

## 2024-11-04 RX ORDER — LANOLIN ALCOHOL/MO/W.PET/CERES
1000 CREAM (GRAM) TOPICAL
COMMUNITY

## 2024-11-04 RX ORDER — TRIAMCINOLONE ACETONIDE 1 MG/G
CREAM TOPICAL
Qty: 15 G | Refills: 0 | Status: SHIPPED | OUTPATIENT
Start: 2024-11-04

## 2024-11-04 RX ORDER — MULTIVITAMIN WITH IRON
1 TABLET ORAL DAILY
COMMUNITY

## 2024-11-04 SDOH — ECONOMIC STABILITY: FOOD INSECURITY: WITHIN THE PAST 12 MONTHS, YOU WORRIED THAT YOUR FOOD WOULD RUN OUT BEFORE YOU GOT MONEY TO BUY MORE.: NEVER TRUE

## 2024-11-04 SDOH — ECONOMIC STABILITY: FOOD INSECURITY: WITHIN THE PAST 12 MONTHS, THE FOOD YOU BOUGHT JUST DIDN'T LAST AND YOU DIDN'T HAVE MONEY TO GET MORE.: NEVER TRUE

## 2024-11-04 SDOH — ECONOMIC STABILITY: INCOME INSECURITY: HOW HARD IS IT FOR YOU TO PAY FOR THE VERY BASICS LIKE FOOD, HOUSING, MEDICAL CARE, AND HEATING?: NOT HARD AT ALL

## 2024-11-04 ASSESSMENT — PATIENT HEALTH QUESTIONNAIRE - PHQ9
SUM OF ALL RESPONSES TO PHQ QUESTIONS 1-9: 9
10. IF YOU CHECKED OFF ANY PROBLEMS, HOW DIFFICULT HAVE THESE PROBLEMS MADE IT FOR YOU TO DO YOUR WORK, TAKE CARE OF THINGS AT HOME, OR GET ALONG WITH OTHER PEOPLE: NOT DIFFICULT AT ALL
9. THOUGHTS THAT YOU WOULD BE BETTER OFF DEAD, OR OF HURTING YOURSELF: NOT AT ALL
8. MOVING OR SPEAKING SO SLOWLY THAT OTHER PEOPLE COULD HAVE NOTICED. OR THE OPPOSITE, BEING SO FIGETY OR RESTLESS THAT YOU HAVE BEEN MOVING AROUND A LOT MORE THAN USUAL: NOT AT ALL
SUM OF ALL RESPONSES TO PHQ QUESTIONS 1-9: 9
4. FEELING TIRED OR HAVING LITTLE ENERGY: NOT AT ALL
2. FEELING DOWN, DEPRESSED OR HOPELESS: NEARLY EVERY DAY
6. FEELING BAD ABOUT YOURSELF - OR THAT YOU ARE A FAILURE OR HAVE LET YOURSELF OR YOUR FAMILY DOWN: NOT AT ALL
7. TROUBLE CONCENTRATING ON THINGS, SUCH AS READING THE NEWSPAPER OR WATCHING TELEVISION: NOT AT ALL
3. TROUBLE FALLING OR STAYING ASLEEP: NEARLY EVERY DAY
5. POOR APPETITE OR OVEREATING: NOT AT ALL
SUM OF ALL RESPONSES TO PHQ9 QUESTIONS 1 & 2: 6
1. LITTLE INTEREST OR PLEASURE IN DOING THINGS: NEARLY EVERY DAY

## 2024-11-04 NOTE — PROGRESS NOTES
\"Have you been to the ER, urgent care clinic since your last visit?  Hospitalized since your last visit?\"    NO    “Have you seen or consulted any other health care providers outside our system since your last visit?”    Yes - [facility]     “Have you had a diabetic eye exam?”    YES - Where: 2024     Date of last diabetic eye exam: 3/23/2022     Chief Complaint   Patient presents with    Rash     On Left Side ABD      BP (!) 142/78 (Site: Left Upper Arm, Position: Sitting, Cuff Size: Large Adult)   Pulse 87   Temp 98.7 °F (37.1 °C) (Temporal)   Resp 18   Ht 1.854 m (6' 1\")   Wt 101.6 kg (224 lb)   SpO2 97%   BMI 29.55 kg/m²            
    Allergies   Allergen Reactions    Teicoplanin Rash       Objective  Vitals:    11/04/24 1323   BP: (!) 142/78   Site: Left Upper Arm   Position: Sitting   Cuff Size: Large Adult   Pulse: 87   Resp: 18   Temp: 98.7 °F (37.1 °C)   TempSrc: Temporal   SpO2: 97%   Weight: 101.6 kg (224 lb)   Height: 1.854 m (6' 1\")     Physical Exam  Vitals and nursing note reviewed.   Constitutional:       Appearance: Normal appearance.   Eyes:      Extraocular Movements: Extraocular movements intact.   Cardiovascular:      Rate and Rhythm: Normal rate and regular rhythm.      Heart sounds: Normal heart sounds.   Pulmonary:      Effort: Pulmonary effort is normal.      Breath sounds: Normal breath sounds.   Abdominal:      General: Bowel sounds are normal.      Palpations: Abdomen is soft.   Musculoskeletal:      Cervical back: Normal range of motion.   Lymphadenopathy:      Cervical: No cervical adenopathy.   Skin:     Findings: Rash present.      Comments: Scatttered, slightly raised blotchy rash to left lower abdomen. No vesicles noted.    Neurological:      General: No focal deficit present.      Mental Status: He is alert and oriented to person, place, and time. Mental status is at baseline.   Psychiatric:         Mood and Affect: Mood normal.         Behavior: Behavior normal.         Thought Content: Thought content normal.         Judgment: Judgment normal.       Assessment & Plan  Korey was seen today for rash.    Diagnoses and all orders for this visit:    Atopic dermatitis, unspecified type  Acute, uncontrolled. Educated to stop using dog brush due to risk of infection. To use kenalog ointment on area of discomfort. TO not use consistently over 2 weeks. If does not improve in the next week, notify the office. Can also utilize benadryl PRN for pruritus. Avoid hot water and keep area hydrated with aquaphor as well. Patient agreeable to plan.   -     triamcinolone (KENALOG) 0.1 % cream; Apply topically 2 times

## 2024-12-10 ENCOUNTER — PATIENT MESSAGE (OUTPATIENT)
Age: 59
End: 2024-12-10

## 2024-12-10 DIAGNOSIS — E08.42 DIABETIC POLYNEUROPATHY ASSOCIATED WITH DIABETES MELLITUS DUE TO UNDERLYING CONDITION (HCC): ICD-10-CM

## 2024-12-11 RX ORDER — ESZOPICLONE 3 MG/1
3 TABLET, FILM COATED ORAL NIGHTLY PRN
Qty: 30 TABLET | Refills: 5 | Status: SHIPPED | OUTPATIENT
Start: 2024-12-11 | End: 2025-01-10

## 2025-02-05 ENCOUNTER — OFFICE VISIT (OUTPATIENT)
Dept: PRIMARY CARE CLINIC | Facility: CLINIC | Age: 60
End: 2025-02-05
Payer: MEDICARE

## 2025-02-05 VITALS
TEMPERATURE: 98 F | DIASTOLIC BLOOD PRESSURE: 80 MMHG | WEIGHT: 223 LBS | HEIGHT: 73 IN | BODY MASS INDEX: 29.55 KG/M2 | SYSTOLIC BLOOD PRESSURE: 152 MMHG | OXYGEN SATURATION: 99 % | HEART RATE: 86 BPM

## 2025-02-05 DIAGNOSIS — Z23 ENCOUNTER FOR IMMUNIZATION: ICD-10-CM

## 2025-02-05 DIAGNOSIS — G25.81 RESTLESS LEG SYNDROME: Primary | ICD-10-CM

## 2025-02-05 DIAGNOSIS — E11.3499 SEVERE NONPROLIFERATIVE DIABETIC RETINOPATHY ASSOCIATED WITH TYPE 2 DIABETES MELLITUS, MACULAR EDEMA PRESENCE UNSPECIFIED, UNSPECIFIED LATERALITY (HCC): ICD-10-CM

## 2025-02-05 PROCEDURE — 3079F DIAST BP 80-89 MM HG: CPT | Performed by: FAMILY MEDICINE

## 2025-02-05 PROCEDURE — G0009 ADMIN PNEUMOCOCCAL VACCINE: HCPCS | Performed by: FAMILY MEDICINE

## 2025-02-05 PROCEDURE — 3077F SYST BP >= 140 MM HG: CPT | Performed by: FAMILY MEDICINE

## 2025-02-05 PROCEDURE — 90677 PCV20 VACCINE IM: CPT | Performed by: FAMILY MEDICINE

## 2025-02-05 PROCEDURE — 99214 OFFICE O/P EST MOD 30 MIN: CPT | Performed by: FAMILY MEDICINE

## 2025-02-05 RX ORDER — ESZOPICLONE 3 MG/1
3 TABLET, FILM COATED ORAL NIGHTLY
COMMUNITY

## 2025-02-05 RX ORDER — ROPINIROLE 0.5 MG/1
TABLET, FILM COATED ORAL
Qty: 60 TABLET | Refills: 2 | Status: SHIPPED | OUTPATIENT
Start: 2025-02-05

## 2025-02-05 SDOH — ECONOMIC STABILITY: FOOD INSECURITY: WITHIN THE PAST 12 MONTHS, THE FOOD YOU BOUGHT JUST DIDN'T LAST AND YOU DIDN'T HAVE MONEY TO GET MORE.: NEVER TRUE

## 2025-02-05 SDOH — ECONOMIC STABILITY: FOOD INSECURITY: WITHIN THE PAST 12 MONTHS, YOU WORRIED THAT YOUR FOOD WOULD RUN OUT BEFORE YOU GOT MONEY TO BUY MORE.: NEVER TRUE

## 2025-02-05 ASSESSMENT — ANXIETY QUESTIONNAIRES
2. NOT BEING ABLE TO STOP OR CONTROL WORRYING: NOT AT ALL
GAD7 TOTAL SCORE: 6
IF YOU CHECKED OFF ANY PROBLEMS ON THIS QUESTIONNAIRE, HOW DIFFICULT HAVE THESE PROBLEMS MADE IT FOR YOU TO DO YOUR WORK, TAKE CARE OF THINGS AT HOME, OR GET ALONG WITH OTHER PEOPLE: EXTREMELY DIFFICULT
3. WORRYING TOO MUCH ABOUT DIFFERENT THINGS: NOT AT ALL
4. TROUBLE RELAXING: NOT AT ALL
7. FEELING AFRAID AS IF SOMETHING AWFUL MIGHT HAPPEN: NOT AT ALL
5. BEING SO RESTLESS THAT IT IS HARD TO SIT STILL: NOT AT ALL
1. FEELING NERVOUS, ANXIOUS, OR ON EDGE: NEARLY EVERY DAY
6. BECOMING EASILY ANNOYED OR IRRITABLE: NEARLY EVERY DAY

## 2025-02-05 ASSESSMENT — ENCOUNTER SYMPTOMS
EYE REDNESS: 1
SORE THROAT: 0
ABDOMINAL PAIN: 0
SHORTNESS OF BREATH: 0
PHOTOPHOBIA: 1

## 2025-02-05 ASSESSMENT — PATIENT HEALTH QUESTIONNAIRE - PHQ9
6. FEELING BAD ABOUT YOURSELF - OR THAT YOU ARE A FAILURE OR HAVE LET YOURSELF OR YOUR FAMILY DOWN: NEARLY EVERY DAY
SUM OF ALL RESPONSES TO PHQ QUESTIONS 1-9: 16
2. FEELING DOWN, DEPRESSED OR HOPELESS: NEARLY EVERY DAY
SUM OF ALL RESPONSES TO PHQ9 QUESTIONS 1 & 2: 6
7. TROUBLE CONCENTRATING ON THINGS, SUCH AS READING THE NEWSPAPER OR WATCHING TELEVISION: NOT AT ALL
9. THOUGHTS THAT YOU WOULD BE BETTER OFF DEAD, OR OF HURTING YOURSELF: NOT AT ALL
SUM OF ALL RESPONSES TO PHQ QUESTIONS 1-9: 16
5. POOR APPETITE OR OVEREATING: NEARLY EVERY DAY
1. LITTLE INTEREST OR PLEASURE IN DOING THINGS: NEARLY EVERY DAY
4. FEELING TIRED OR HAVING LITTLE ENERGY: SEVERAL DAYS
8. MOVING OR SPEAKING SO SLOWLY THAT OTHER PEOPLE COULD HAVE NOTICED. OR THE OPPOSITE, BEING SO FIGETY OR RESTLESS THAT YOU HAVE BEEN MOVING AROUND A LOT MORE THAN USUAL: NOT AT ALL
SUM OF ALL RESPONSES TO PHQ QUESTIONS 1-9: 16
10. IF YOU CHECKED OFF ANY PROBLEMS, HOW DIFFICULT HAVE THESE PROBLEMS MADE IT FOR YOU TO DO YOUR WORK, TAKE CARE OF THINGS AT HOME, OR GET ALONG WITH OTHER PEOPLE: EXTREMELY DIFFICULT
3. TROUBLE FALLING OR STAYING ASLEEP: NEARLY EVERY DAY
SUM OF ALL RESPONSES TO PHQ QUESTIONS 1-9: 16

## 2025-02-05 NOTE — PROGRESS NOTES
\"Have you been to the ER, urgent care clinic since your last visit?  Hospitalized since your last visit?\"    NO    “Have you seen or consulted any other health care providers outside our system since your last visit?”    NO    “Have you had a diabetic eye exam?”    YES - In November of 2024. Nurse/CMA to request most recent records if not in the chart     Date of last diabetic eye exam: 3/23/2022

## 2025-02-05 NOTE — PROGRESS NOTES
PCP: Nan Perez, APRN - CNP    CC: Medication Problem (Patient has concerns about tizanidine and eszopiclone. ) and Other (Patient also needs a DMV form filled out. )      Subjective      Korey Miller is a 59 y.o. male,Established patient, who presents for medication concerns and DMV forms.     DMV forms: Needs DMV forms for sunshade while driving. He is being treated for a retina disorder by Dr. Paul with the retina [facility] and is very sensitive to the light while driving. He is needing 70% tinting of the windshield.  He did ask his ophthalmologist to help complete this form, but was informed that the doctors do not complete DMV forms    Medication concerns: He is currently taking eszopiclone. He has been using that for sleep due to his peipheral neuropathy.  This was prescribed by his neurologist he has been on the medication for the past week or so, feels that it was beneficial at first, but stopped working.  He does endorse getting some muscle cramps in the middle of the night, and was taking tizanidine prior to bed to help with muscle cramps.  He felt that it was helping initially, but not as much anymore    Review of Systems   Constitutional:  Negative for chills and fever.   HENT:  Negative for sore throat.    Eyes:  Positive for photophobia and redness. Negative for visual disturbance.   Respiratory:  Negative for shortness of breath.    Cardiovascular:  Negative for chest pain.   Gastrointestinal:  Negative for abdominal pain.   Genitourinary:  Negative for difficulty urinating and penile discharge.   Musculoskeletal:  Negative for arthralgias.   Skin:  Negative for rash.   Neurological:  Negative for dizziness, weakness and light-headedness.   Psychiatric/Behavioral:  Negative for dysphoric mood and suicidal ideas.          Objective      Vitals:    02/05/25 1018   BP: (!) 152/80   Site: Right Upper Arm   Position: Sitting   Cuff Size: Large Adult   Pulse: 86   Temp: 98

## 2025-02-05 NOTE — ASSESSMENT & PLAN NOTE
-Completed DMV forms for sunshade on his car due to photophobia in the setting of diabetic retinopathy

## 2025-02-16 DIAGNOSIS — I10 ESSENTIAL (PRIMARY) HYPERTENSION: ICD-10-CM

## 2025-02-16 DIAGNOSIS — M1A.9XX0 CHRONIC GOUT WITHOUT TOPHUS, UNSPECIFIED CAUSE, UNSPECIFIED SITE: ICD-10-CM

## 2025-02-17 NOTE — TELEPHONE ENCOUNTER
Requested Prescriptions     Pending Prescriptions Disp Refills    allopurinol (ZYLOPRIM) 300 MG tablet [Pharmacy Med Name: ALLOPURINOL 300 MG TABLET] 90 tablet 1     Sig: TAKE 1 TABLET BY MOUTH EVERY DAY    amLODIPine (NORVASC) 10 MG tablet [Pharmacy Med Name: AMLODIPINE BESYLATE 10 MG TAB] 90 tablet 1     Sig: TAKE 1 TABLET BY MOUTH EVERY DAY FOR HIGH BLOOD PRESSURE

## 2025-02-18 RX ORDER — AMLODIPINE BESYLATE 10 MG/1
TABLET ORAL
Qty: 90 TABLET | Refills: 1 | Status: SHIPPED | OUTPATIENT
Start: 2025-02-18

## 2025-02-18 RX ORDER — ALLOPURINOL 300 MG/1
300 TABLET ORAL DAILY
Qty: 90 TABLET | Refills: 1 | Status: SHIPPED | OUTPATIENT
Start: 2025-02-18

## 2025-03-04 DIAGNOSIS — L20.9 ATOPIC DERMATITIS, UNSPECIFIED TYPE: ICD-10-CM

## 2025-03-04 DIAGNOSIS — I10 ESSENTIAL (PRIMARY) HYPERTENSION: ICD-10-CM

## 2025-03-04 RX ORDER — TRIAMCINOLONE ACETONIDE 1 MG/G
CREAM TOPICAL
Qty: 15 G | Refills: 0 | Status: SHIPPED | OUTPATIENT
Start: 2025-03-04

## 2025-03-04 NOTE — TELEPHONE ENCOUNTER
Requested Prescriptions     Pending Prescriptions Disp Refills    triamcinolone (KENALOG) 0.1 % cream 15 g 0     Sig: Apply topically 2 times daily.

## 2025-03-04 NOTE — TELEPHONE ENCOUNTER
Method of communication:  [x]Fax []Phone call []In person   []Other:    [facility]  What medication/s (include strength and dosing):  Triamcinolone 0.1% cream      This is for a:   [x]Refill    []New medication request  []Follow up on prior request    [facility]  Best contact for patient: [phone]    Additional notes:

## 2025-03-11 DIAGNOSIS — G25.81 RESTLESS LEG SYNDROME: ICD-10-CM

## 2025-03-11 RX ORDER — ROPINIROLE 1 MG/1
1 TABLET, FILM COATED ORAL NIGHTLY
Qty: 90 TABLET | Refills: 0 | Status: SHIPPED | OUTPATIENT
Start: 2025-03-11 | End: 2025-06-09

## 2025-03-12 ENCOUNTER — OFFICE VISIT (OUTPATIENT)
Age: 60
End: 2025-03-12
Payer: MEDICARE

## 2025-03-12 VITALS
DIASTOLIC BLOOD PRESSURE: 76 MMHG | SYSTOLIC BLOOD PRESSURE: 128 MMHG | OXYGEN SATURATION: 98 % | RESPIRATION RATE: 18 BRPM | TEMPERATURE: 97.4 F | HEART RATE: 84 BPM

## 2025-03-12 DIAGNOSIS — G62.9 POLYNEUROPATHY: Primary | ICD-10-CM

## 2025-03-12 PROCEDURE — 3078F DIAST BP <80 MM HG: CPT | Performed by: NURSE PRACTITIONER

## 2025-03-12 PROCEDURE — 99214 OFFICE O/P EST MOD 30 MIN: CPT | Performed by: NURSE PRACTITIONER

## 2025-03-12 PROCEDURE — 3074F SYST BP LT 130 MM HG: CPT | Performed by: NURSE PRACTITIONER

## 2025-04-15 SDOH — HEALTH STABILITY: PHYSICAL HEALTH: ON AVERAGE, HOW MANY MINUTES DO YOU ENGAGE IN EXERCISE AT THIS LEVEL?: 40 MIN

## 2025-04-18 ENCOUNTER — OFFICE VISIT (OUTPATIENT)
Facility: CLINIC | Age: 60
End: 2025-04-18

## 2025-04-18 VITALS
BODY MASS INDEX: 28.63 KG/M2 | HEART RATE: 79 BPM | SYSTOLIC BLOOD PRESSURE: 121 MMHG | HEIGHT: 73 IN | WEIGHT: 216 LBS | RESPIRATION RATE: 17 BRPM | OXYGEN SATURATION: 99 % | TEMPERATURE: 98.7 F | DIASTOLIC BLOOD PRESSURE: 79 MMHG

## 2025-04-18 DIAGNOSIS — E08.42 DIABETIC POLYNEUROPATHY ASSOCIATED WITH DIABETES MELLITUS DUE TO UNDERLYING CONDITION: ICD-10-CM

## 2025-04-18 DIAGNOSIS — G56.23 ENTRAPMENT OF BOTH ULNAR NERVES: ICD-10-CM

## 2025-04-18 DIAGNOSIS — G47.00 INSOMNIA, UNSPECIFIED TYPE: ICD-10-CM

## 2025-04-18 DIAGNOSIS — E11.65 UNCONTROLLED TYPE 2 DIABETES MELLITUS WITH HYPERGLYCEMIA (HCC): Primary | ICD-10-CM

## 2025-04-18 DIAGNOSIS — E11.42 DIABETIC POLYNEUROPATHY ASSOCIATED WITH TYPE 2 DIABETES MELLITUS: ICD-10-CM

## 2025-04-18 DIAGNOSIS — I10 ESSENTIAL (PRIMARY) HYPERTENSION: ICD-10-CM

## 2025-04-18 DIAGNOSIS — G56.03 BILATERAL CARPAL TUNNEL SYNDROME: ICD-10-CM

## 2025-04-18 DIAGNOSIS — R11.0 NAUSEA: ICD-10-CM

## 2025-04-18 DIAGNOSIS — E11.3412 SEVERE NONPROLIFERATIVE DIABETIC RETINOPATHY OF LEFT EYE WITH MACULAR EDEMA ASSOCIATED WITH TYPE 2 DIABETES MELLITUS: ICD-10-CM

## 2025-04-18 DIAGNOSIS — Z02.89 ENCOUNTER FOR COMPLETION OF FORM WITH PATIENT: ICD-10-CM

## 2025-04-18 PROBLEM — G62.9 NEUROPATHY: Status: RESOLVED | Noted: 2023-09-12 | Resolved: 2025-04-18

## 2025-04-18 PROBLEM — R20.2 PARESTHESIA OF SKIN: Status: RESOLVED | Noted: 2022-03-03 | Resolved: 2025-04-18

## 2025-04-18 PROBLEM — G47.9 DISORDERED SLEEP: Status: RESOLVED | Noted: 2022-06-05 | Resolved: 2025-04-18

## 2025-04-18 PROBLEM — R20.0 ANESTHESIA OF SKIN: Status: RESOLVED | Noted: 2022-03-03 | Resolved: 2025-04-18

## 2025-04-18 PROBLEM — B35.1 ONYCHOMYCOSIS: Status: RESOLVED | Noted: 2020-11-04 | Resolved: 2025-04-18

## 2025-04-18 PROBLEM — R53.1 WEAKNESS OF ONE SIDE OF BODY: Status: RESOLVED | Noted: 2022-06-03 | Resolved: 2025-04-18

## 2025-04-18 PROBLEM — G56.20 LESION OF ULNAR NERVE: Status: RESOLVED | Noted: 2022-03-03 | Resolved: 2025-04-18

## 2025-04-18 PROBLEM — R76.8 POSITIVE ANA (ANTINUCLEAR ANTIBODY): Status: RESOLVED | Noted: 2022-06-05 | Resolved: 2025-04-18

## 2025-04-18 PROBLEM — G56.21 ULNAR NERVE ENTRAPMENT AT RIGHT ELBOW: Status: RESOLVED | Noted: 2022-06-03 | Resolved: 2025-04-18

## 2025-04-18 PROBLEM — R43.0 LOSS OF PERCEPTION FOR SMELL: Status: RESOLVED | Noted: 2020-11-04 | Resolved: 2025-04-18

## 2025-04-18 PROBLEM — L90.5 SCAR CONDITIONS AND FIBROSIS OF SKIN: Status: RESOLVED | Noted: 2022-06-03 | Resolved: 2025-04-18

## 2025-04-18 PROBLEM — M79.642 PAIN IN LEFT HAND: Status: RESOLVED | Noted: 2022-03-03 | Resolved: 2025-04-18

## 2025-04-18 PROBLEM — G56.01 CARPAL TUNNEL SYNDROME OF RIGHT WRIST: Status: RESOLVED | Noted: 2022-03-03 | Resolved: 2025-04-18

## 2025-04-18 PROBLEM — E11.9 TYPE 2 DIABETES MELLITUS: Status: RESOLVED | Noted: 2022-06-03 | Resolved: 2025-04-18

## 2025-04-18 PROBLEM — R60.0 LOCALIZED EDEMA: Status: RESOLVED | Noted: 2022-03-03 | Resolved: 2025-04-18

## 2025-04-18 PROBLEM — G56.02 CARPAL TUNNEL SYNDROME OF LEFT WRIST: Status: RESOLVED | Noted: 2022-03-03 | Resolved: 2025-04-18

## 2025-04-18 PROBLEM — R68.89 OTHER GENERAL SYMPTOMS AND SIGNS: Status: RESOLVED | Noted: 2022-03-03 | Resolved: 2025-04-18

## 2025-04-18 PROBLEM — M62.81 MUSCLE WEAKNESS (GENERALIZED): Status: RESOLVED | Noted: 2022-03-03 | Resolved: 2025-04-18

## 2025-04-18 PROBLEM — R20.8 OTHER DISTURBANCES OF SKIN SENSATION: Status: RESOLVED | Noted: 2022-03-03 | Resolved: 2025-04-18

## 2025-04-18 PROBLEM — G56.22 ENTRAPMENT OF LEFT ULNAR NERVE AT ELBOW: Status: RESOLVED | Noted: 2022-06-03 | Resolved: 2025-04-18

## 2025-04-18 NOTE — PROGRESS NOTES
[facility]  Subjective  Chief Complaint   Patient presents with    new to provider    form completion     HPI:  Korey Miller  : 1965    PCP: Medina Perry DO  Was seeing HECTOR LARISSA PENDLETON who is no longer doing primary care    History of Present Illness  The patient is a 59-year-old male who presents today as paperwork appointment for an attending physician statement for [employer] disability. Previously Dyan had some his paperwork    He has been under the care of Dr. Watts, an endocrinologist, for the past 2.5 years. His most recent A1c level, recorded 3 months ago, was 8.7, a significant improvement from his initial reading of 13. He had previously achieved an A1c level of 7 but experienced a relapse due to dietary indiscretions. He is scheduled for a follow-up visit with Dr. Arroyo on 2025, during which his condition will be reassessed. He is currently on Jardiance and Ozempic for diabetes management, having discontinued metformin when his A1c level reached 7. He has a history of retinopathy in his left eye, which has been treated, and macular edema. No current vision problems in the left eye are reported.    Two falls have occurred since his last visit, attributed to worsening leg pain. He describes a sensation of tightening in his legs, akin to blood flow restriction. Numbness and tingling in his hands and legs are reported, along with perceived loss of strength in his hands. EMG testing for neuropathy was completed in  or , and carpal tunnel syndrome persists in both hands. He has been unemployed since  due to diabetes and peripheral neuropathy, previously working as a  at [employer]. Driving is limited to necessity per his report however he drove himself today. Stationary cycling for 30 minutes daily is the least painful exercise. No sensation in the feet is reported, and discomfort occurs after sitting for 15 to 20

## 2025-04-18 NOTE — PROGRESS NOTES
\"Have you been to the ER, urgent care clinic since your last visit?  Hospitalized since your last visit?\"    NO    “Have you seen or consulted any other health care providers outside our system since your last visit?”    NO    “Have you had a diabetic eye exam?”    Yes, Dr. Price    Date of last diabetic eye exam: 3/23/2022     Chief Complaint   Patient presents with    new to provider    form completion     BP (!) 143/71 (BP Site: Right Upper Arm, Patient Position: Sitting, BP Cuff Size: Large Adult)   Pulse 79   Temp 98.7 °F (37.1 °C) (Temporal)   Resp 17   Ht 1.854 m (6' 1\")   Wt 98 kg (216 lb)   SpO2 99%   BMI 28.50 kg/m²

## 2025-05-06 ENCOUNTER — OFFICE VISIT (OUTPATIENT)
Facility: CLINIC | Age: 60
End: 2025-05-06
Payer: MEDICARE

## 2025-05-06 VITALS
BODY MASS INDEX: 28.63 KG/M2 | WEIGHT: 216 LBS | HEIGHT: 73 IN | SYSTOLIC BLOOD PRESSURE: 122 MMHG | OXYGEN SATURATION: 100 % | RESPIRATION RATE: 18 BRPM | TEMPERATURE: 98.7 F | DIASTOLIC BLOOD PRESSURE: 71 MMHG | HEART RATE: 68 BPM

## 2025-05-06 DIAGNOSIS — G47.00 INSOMNIA, UNSPECIFIED TYPE: Primary | ICD-10-CM

## 2025-05-06 PROCEDURE — 3078F DIAST BP <80 MM HG: CPT | Performed by: NURSE PRACTITIONER

## 2025-05-06 PROCEDURE — 99213 OFFICE O/P EST LOW 20 MIN: CPT | Performed by: NURSE PRACTITIONER

## 2025-05-06 PROCEDURE — 3074F SYST BP LT 130 MM HG: CPT | Performed by: NURSE PRACTITIONER

## 2025-05-06 RX ORDER — TRAZODONE HYDROCHLORIDE 100 MG/1
100 TABLET ORAL NIGHTLY PRN
Qty: 90 TABLET | Refills: 1 | Status: SHIPPED | OUTPATIENT
Start: 2025-05-06

## 2025-05-06 SDOH — ECONOMIC STABILITY: FOOD INSECURITY: WITHIN THE PAST 12 MONTHS, THE FOOD YOU BOUGHT JUST DIDN'T LAST AND YOU DIDN'T HAVE MONEY TO GET MORE.: NEVER TRUE

## 2025-05-06 SDOH — ECONOMIC STABILITY: FOOD INSECURITY: WITHIN THE PAST 12 MONTHS, YOU WORRIED THAT YOUR FOOD WOULD RUN OUT BEFORE YOU GOT MONEY TO BUY MORE.: NEVER TRUE

## 2025-05-06 ASSESSMENT — PATIENT HEALTH QUESTIONNAIRE - PHQ9
2. FEELING DOWN, DEPRESSED OR HOPELESS: NOT AT ALL
SUM OF ALL RESPONSES TO PHQ QUESTIONS 1-9: 0
SUM OF ALL RESPONSES TO PHQ QUESTIONS 1-9: 0
1. LITTLE INTEREST OR PLEASURE IN DOING THINGS: NOT AT ALL
SUM OF ALL RESPONSES TO PHQ QUESTIONS 1-9: 0
SUM OF ALL RESPONSES TO PHQ QUESTIONS 1-9: 0

## 2025-05-06 NOTE — PROGRESS NOTES
Have you been to the ER, urgent care clinic since your last visit?  Hospitalized since your last visit?   NO    Have you seen or consulted any other health care providers outside our system since your last visit?   NO    “Have you had a diabetic eye exam?”    NO     Date of last diabetic eye exam: 3/23/2022          Chief Complaint   Patient presents with    Headache    Lightheadedness    Epistaxis     /71 (BP Site: Right Upper Arm, Patient Position: Sitting, BP Cuff Size: Large Adult)   Pulse 68   Temp 98.7 °F (37.1 °C) (Temporal)   Resp 18   Ht 1.854 m (6' 1\")   Wt 98 kg (216 lb)   SpO2 100%   BMI 28.50 kg/m²     
320-25 MG per tablet Take 1 tablet by mouth daily 90 tablet 3    rosuvastatin (CRESTOR) 20 MG tablet Take 1 tablet by mouth nightly 90 tablet 3    JARDIANCE 25 MG tablet TAKE 1 TABLET BY MOUTH EVERY DAY IN THE MORNING FOR 90 DAYS      OZEMPIC, 1 MG/DOSE, 4 MG/3ML SOPN 2 mg every 7 days      aspirin 81 MG EC tablet Take 1 tablet by mouth daily       No current facility-administered medications for this visit.      Allergies   Allergen Reactions    Teicoplanin Rash      Past Medical History:   Diagnosis Date    Chronic back pain 2012    Chronic kidney disease 2023    Depression 2000    Diabetes (HCC)     Erectile dysfunction 2017    Gout     Hypertension     Neuropathy 2021    Obesity 1975    Onychomycosis 11/04/2020    Restless legs syndrome 2024      Past Surgical History:   Procedure Laterality Date    CARPAL TUNNEL RELEASE Left 09/21/2020    Ulnar release    COLONOSCOPY  2019    HEENT      ORTHOPEDIC SURGERY      ORTHOPEDIC SURGERY Left 09/2020    Carpal tunnel, elbow and nerve transplant    [place] UNLISTED PROCEDURE ABDOMEN PERITONEUM & OMENTUM      SPINE SURGERY  2013    TONSILLECTOMY  1975    UMBILICAL HERNIA REPAIR  1965    UPPER GASTROINTESTINAL ENDOSCOPY  1982      Family History   Problem Relation Age of Onset    Hypertension Mother     Heart Attack Father     Heart Disease Father     Stroke Father     Diabetes Father     Gout Father     High Blood Pressure Father       Social History     Tobacco Use    Smoking status: Never    Smokeless tobacco: Never   Substance Use Topics    Alcohol use: No        Review of Systems    Pertinent items are noted in HPI.     Objective:   /71 (BP Site: Right Upper Arm, Patient Position: Sitting, BP Cuff Size: Large Adult)   Pulse 68   Temp 98.7 °F (37.1 °C) (Temporal)   Resp 18   Ht 1.854 m (6' 1\")   Wt 98 kg (216 lb)   SpO2 100%   BMI 28.50 kg/m²    General Appearance:    Alert, cooperative, no distress, appears stated age   Head:    Normocephalic, without obvious

## 2025-06-25 DIAGNOSIS — G25.81 RESTLESS LEG SYNDROME: ICD-10-CM

## 2025-06-25 RX ORDER — ROPINIROLE 1 MG/1
1 TABLET, FILM COATED ORAL NIGHTLY
Qty: 90 TABLET | Refills: 0 | Status: SHIPPED | OUTPATIENT
Start: 2025-06-25 | End: 2025-09-23

## 2025-06-25 NOTE — TELEPHONE ENCOUNTER
Patient needs a rx refill on rx Ropinirole 1 mg  [facility] [address] [facility] the [facility]. If need to call patient back at [phone]

## 2025-07-25 DIAGNOSIS — M1A.9XX0 CHRONIC GOUT WITHOUT TOPHUS, UNSPECIFIED CAUSE, UNSPECIFIED SITE: ICD-10-CM

## 2025-07-25 DIAGNOSIS — I10 ESSENTIAL (PRIMARY) HYPERTENSION: ICD-10-CM

## 2025-07-25 NOTE — TELEPHONE ENCOUNTER
Requested Prescriptions     Pending Prescriptions Disp Refills    allopurinol (ZYLOPRIM) 300 MG tablet [Pharmacy Med Name: ALLOPURINOL 300 MG TABLET] 90 tablet 1     Sig: TAKE 1 TABLET BY MOUTH EVERY DAY    valsartan-hydroCHLOROthiazide (DIOVAN-HCT) 320-25 MG per tablet [Pharmacy Med Name: VALSARTAN-HCTZ 320-25 MG TAB] 90 tablet 1     Sig: TAKE 1 TABLET BY MOUTH EVERY DAY    amLODIPine (NORVASC) 10 MG tablet [Pharmacy Med Name: AMLODIPINE BESYLATE 10 MG TAB] 90 tablet 1     Sig: TAKE 1 TABLET BY MOUTH EVERY DAY FOR HIGH BLOOD PRESSURE

## 2025-07-28 RX ORDER — AMLODIPINE BESYLATE 10 MG/1
10 TABLET ORAL DAILY
Qty: 90 TABLET | Refills: 0 | Status: SHIPPED | OUTPATIENT
Start: 2025-07-28

## 2025-07-28 RX ORDER — VALSARTAN AND HYDROCHLOROTHIAZIDE 320; 25 MG/1; MG/1
1 TABLET, FILM COATED ORAL DAILY
Qty: 90 TABLET | Refills: 0 | Status: SHIPPED | OUTPATIENT
Start: 2025-07-28

## 2025-07-28 RX ORDER — ALLOPURINOL 300 MG/1
300 TABLET ORAL DAILY
Qty: 90 TABLET | Refills: 0 | Status: SHIPPED | OUTPATIENT
Start: 2025-07-28

## 2025-08-25 ENCOUNTER — OFFICE VISIT (OUTPATIENT)
Facility: CLINIC | Age: 60
End: 2025-08-25

## 2025-08-25 VITALS
RESPIRATION RATE: 18 BRPM | OXYGEN SATURATION: 96 % | HEART RATE: 99 BPM | TEMPERATURE: 98.2 F | HEIGHT: 73 IN | BODY MASS INDEX: 27.57 KG/M2 | WEIGHT: 208 LBS | DIASTOLIC BLOOD PRESSURE: 67 MMHG | SYSTOLIC BLOOD PRESSURE: 122 MMHG

## 2025-08-25 DIAGNOSIS — M1A.9XX0 CHRONIC GOUT WITHOUT TOPHUS, UNSPECIFIED CAUSE, UNSPECIFIED SITE: ICD-10-CM

## 2025-08-25 DIAGNOSIS — G47.00 INSOMNIA, UNSPECIFIED TYPE: ICD-10-CM

## 2025-08-25 DIAGNOSIS — E11.42 DIABETIC POLYNEUROPATHY ASSOCIATED WITH TYPE 2 DIABETES MELLITUS (HCC): ICD-10-CM

## 2025-08-25 DIAGNOSIS — G25.81 RESTLESS LEG SYNDROME: Primary | ICD-10-CM

## 2025-08-25 DIAGNOSIS — I10 ESSENTIAL (PRIMARY) HYPERTENSION: ICD-10-CM

## 2025-08-25 DIAGNOSIS — E11.3412 SEVERE NONPROLIFERATIVE DIABETIC RETINOPATHY OF LEFT EYE WITH MACULAR EDEMA ASSOCIATED WITH TYPE 2 DIABETES MELLITUS (HCC): ICD-10-CM

## 2025-08-25 RX ORDER — ROPINIROLE 2 MG/1
2 TABLET, FILM COATED ORAL NIGHTLY
Qty: 90 TABLET | Refills: 1 | Status: SHIPPED | OUTPATIENT
Start: 2025-08-25 | End: 2026-02-21

## 2025-09-07 PROBLEM — G25.81 RESTLESS LEG SYNDROME: Status: ACTIVE | Noted: 2025-09-07
